# Patient Record
Sex: FEMALE | Race: WHITE | NOT HISPANIC OR LATINO | Employment: UNEMPLOYED | URBAN - METROPOLITAN AREA
[De-identification: names, ages, dates, MRNs, and addresses within clinical notes are randomized per-mention and may not be internally consistent; named-entity substitution may affect disease eponyms.]

---

## 2023-11-07 ENCOUNTER — HOSPITAL ENCOUNTER (INPATIENT)
Facility: HOSPITAL | Age: 48
LOS: 2 days | Discharge: ANOTHER HOSPITAL | DRG: 080 | End: 2023-11-09
Attending: STUDENT IN AN ORGANIZED HEALTH CARE EDUCATION/TRAINING PROGRAM | Admitting: STUDENT IN AN ORGANIZED HEALTH CARE EDUCATION/TRAINING PROGRAM
Payer: COMMERCIAL

## 2023-11-07 ENCOUNTER — APPOINTMENT (OUTPATIENT)
Dept: RADIOLOGY | Facility: HOSPITAL | Age: 48
DRG: 080 | End: 2023-11-07
Payer: COMMERCIAL

## 2023-11-07 ENCOUNTER — APPOINTMENT (OUTPATIENT)
Dept: CARDIOLOGY | Facility: HOSPITAL | Age: 48
DRG: 080 | End: 2023-11-07
Payer: COMMERCIAL

## 2023-11-07 DIAGNOSIS — G93.89 BRAIN MASS: Primary | ICD-10-CM

## 2023-11-07 LAB
ALBUMIN SERPL BCP-MCNC: 4 G/DL (ref 3.4–5)
ALP SERPL-CCNC: 73 U/L (ref 33–110)
ALT SERPL W P-5'-P-CCNC: 17 U/L (ref 7–45)
ANION GAP SERPL CALC-SCNC: 10 MMOL/L (ref 10–20)
APTT PPP: 36 SECONDS (ref 27–38)
AST SERPL W P-5'-P-CCNC: 16 U/L (ref 9–39)
BASOPHILS # BLD AUTO: 0.03 X10*3/UL (ref 0–0.1)
BASOPHILS NFR BLD AUTO: 0.4 %
BILIRUB SERPL-MCNC: 0.3 MG/DL (ref 0–1.2)
BUN SERPL-MCNC: 11 MG/DL (ref 6–23)
CALCIUM SERPL-MCNC: 9.4 MG/DL (ref 8.6–10.3)
CARDIAC TROPONIN I PNL SERPL HS: 3 NG/L (ref 0–13)
CHLORIDE SERPL-SCNC: 99 MMOL/L (ref 98–107)
CO2 SERPL-SCNC: 28 MMOL/L (ref 21–32)
CREAT SERPL-MCNC: 0.48 MG/DL (ref 0.5–1.05)
EOSINOPHIL # BLD AUTO: 0.39 X10*3/UL (ref 0–0.7)
EOSINOPHIL NFR BLD AUTO: 5 %
ERYTHROCYTE [DISTWIDTH] IN BLOOD BY AUTOMATED COUNT: 13.5 % (ref 11.5–14.5)
GFR SERPL CREATININE-BSD FRML MDRD: >90 ML/MIN/1.73M*2
GLUCOSE BLD MANUAL STRIP-MCNC: 144 MG/DL (ref 74–99)
GLUCOSE SERPL-MCNC: 128 MG/DL (ref 74–99)
HCT VFR BLD AUTO: 40 % (ref 36–46)
HGB BLD-MCNC: 13.8 G/DL (ref 12–16)
IMM GRANULOCYTES # BLD AUTO: 0.03 X10*3/UL (ref 0–0.7)
IMM GRANULOCYTES NFR BLD AUTO: 0.4 % (ref 0–0.9)
INR PPP: 1.1 (ref 0.9–1.1)
LYMPHOCYTES # BLD AUTO: 1.44 X10*3/UL (ref 1.2–4.8)
LYMPHOCYTES NFR BLD AUTO: 18.4 %
MCH RBC QN AUTO: 31 PG (ref 26–34)
MCHC RBC AUTO-ENTMCNC: 34.5 G/DL (ref 32–36)
MCV RBC AUTO: 90 FL (ref 80–100)
MONOCYTES # BLD AUTO: 0.43 X10*3/UL (ref 0.1–1)
MONOCYTES NFR BLD AUTO: 5.5 %
NEUTROPHILS # BLD AUTO: 5.51 X10*3/UL (ref 1.2–7.7)
NEUTROPHILS NFR BLD AUTO: 70.3 %
NRBC BLD-RTO: 0 /100 WBCS (ref 0–0)
PLATELET # BLD AUTO: 320 X10*3/UL (ref 150–450)
POTASSIUM SERPL-SCNC: 3.3 MMOL/L (ref 3.5–5.3)
PROT SERPL-MCNC: 7.4 G/DL (ref 6.4–8.2)
PROTHROMBIN TIME: 11.9 SECONDS (ref 9.8–12.8)
RBC # BLD AUTO: 4.45 X10*6/UL (ref 4–5.2)
SODIUM SERPL-SCNC: 134 MMOL/L (ref 136–145)
TSH SERPL-ACNC: 2.88 MIU/L (ref 0.44–3.98)
WBC # BLD AUTO: 7.8 X10*3/UL (ref 4.4–11.3)

## 2023-11-07 PROCEDURE — 84484 ASSAY OF TROPONIN QUANT: CPT | Performed by: STUDENT IN AN ORGANIZED HEALTH CARE EDUCATION/TRAINING PROGRAM

## 2023-11-07 PROCEDURE — 80053 COMPREHEN METABOLIC PANEL: CPT | Performed by: STUDENT IN AN ORGANIZED HEALTH CARE EDUCATION/TRAINING PROGRAM

## 2023-11-07 PROCEDURE — 82947 ASSAY GLUCOSE BLOOD QUANT: CPT

## 2023-11-07 PROCEDURE — 85610 PROTHROMBIN TIME: CPT | Performed by: STUDENT IN AN ORGANIZED HEALTH CARE EDUCATION/TRAINING PROGRAM

## 2023-11-07 PROCEDURE — 70450 CT HEAD/BRAIN W/O DYE: CPT

## 2023-11-07 PROCEDURE — 36415 COLL VENOUS BLD VENIPUNCTURE: CPT | Performed by: STUDENT IN AN ORGANIZED HEALTH CARE EDUCATION/TRAINING PROGRAM

## 2023-11-07 PROCEDURE — 85025 COMPLETE CBC W/AUTO DIFF WBC: CPT | Performed by: STUDENT IN AN ORGANIZED HEALTH CARE EDUCATION/TRAINING PROGRAM

## 2023-11-07 PROCEDURE — 99285 EMERGENCY DEPT VISIT HI MDM: CPT | Mod: 25 | Performed by: STUDENT IN AN ORGANIZED HEALTH CARE EDUCATION/TRAINING PROGRAM

## 2023-11-07 PROCEDURE — 93005 ELECTROCARDIOGRAM TRACING: CPT

## 2023-11-07 PROCEDURE — 99223 1ST HOSP IP/OBS HIGH 75: CPT | Performed by: STUDENT IN AN ORGANIZED HEALTH CARE EDUCATION/TRAINING PROGRAM

## 2023-11-07 PROCEDURE — 1210000001 HC SEMI-PRIVATE ROOM DAILY

## 2023-11-07 PROCEDURE — 85730 THROMBOPLASTIN TIME PARTIAL: CPT | Performed by: STUDENT IN AN ORGANIZED HEALTH CARE EDUCATION/TRAINING PROGRAM

## 2023-11-07 PROCEDURE — 2500000004 HC RX 250 GENERAL PHARMACY W/ HCPCS (ALT 636 FOR OP/ED): Performed by: STUDENT IN AN ORGANIZED HEALTH CARE EDUCATION/TRAINING PROGRAM

## 2023-11-07 PROCEDURE — 96374 THER/PROPH/DIAG INJ IV PUSH: CPT

## 2023-11-07 PROCEDURE — 84443 ASSAY THYROID STIM HORMONE: CPT | Performed by: STUDENT IN AN ORGANIZED HEALTH CARE EDUCATION/TRAINING PROGRAM

## 2023-11-07 PROCEDURE — 70450 CT HEAD/BRAIN W/O DYE: CPT | Performed by: RADIOLOGY

## 2023-11-07 RX ORDER — BUDESONIDE 1 MG/2ML
1 INHALANT ORAL 2 TIMES DAILY
COMMUNITY
Start: 2022-09-12 | End: 2024-04-08 | Stop reason: SDUPTHER

## 2023-11-07 RX ORDER — LEVALBUTEROL INHALATION SOLUTION 1.25 MG/3ML
1 SOLUTION RESPIRATORY (INHALATION) EVERY 4 HOURS PRN
COMMUNITY
Start: 2023-10-23 | End: 2024-04-08 | Stop reason: SDUPTHER

## 2023-11-07 RX ORDER — BUDESONIDE 0.5 MG/2ML
1 INHALANT ORAL
Status: DISCONTINUED | OUTPATIENT
Start: 2023-11-07 | End: 2023-11-08

## 2023-11-07 RX ORDER — DEXAMETHASONE SODIUM PHOSPHATE 10 MG/ML
10 INJECTION INTRAMUSCULAR; INTRAVENOUS ONCE
Status: COMPLETED | OUTPATIENT
Start: 2023-11-07 | End: 2023-11-07

## 2023-11-07 RX ORDER — MONTELUKAST SODIUM 10 MG/1
10 TABLET ORAL NIGHTLY
Status: DISCONTINUED | OUTPATIENT
Start: 2023-11-07 | End: 2023-11-08

## 2023-11-07 RX ORDER — POTASSIUM CHLORIDE 20 MEQ/1
40 TABLET, EXTENDED RELEASE ORAL DAILY
Status: DISCONTINUED | OUTPATIENT
Start: 2023-11-07 | End: 2023-11-08

## 2023-11-07 RX ORDER — LEVALBUTEROL 1.25 MG/.5ML
1.25 SOLUTION, CONCENTRATE RESPIRATORY (INHALATION) ONCE
Status: DISCONTINUED | OUTPATIENT
Start: 2023-11-07 | End: 2023-11-08

## 2023-11-07 RX ORDER — MONTELUKAST SODIUM 10 MG/1
1 TABLET ORAL NIGHTLY
COMMUNITY
Start: 2022-06-03

## 2023-11-07 RX ADMIN — POTASSIUM CHLORIDE 40 MEQ: 1500 TABLET, EXTENDED RELEASE ORAL at 17:00

## 2023-11-07 RX ADMIN — DEXAMETHASONE SODIUM PHOSPHATE 10 MG: 10 INJECTION, SOLUTION INTRAMUSCULAR; INTRAVENOUS at 15:18

## 2023-11-07 ASSESSMENT — PAIN SCALES - GENERAL
PAINLEVEL_OUTOF10: 0 - NO PAIN
PAINLEVEL_OUTOF10: 8

## 2023-11-07 ASSESSMENT — ABCD2 SCORE
CLINICAL FEATURES OF THE TIA: NO UNILATERAL WEAKNESS OR SPEECH DISTURBANCE
ABCD2 SCORE: 2
HISTORY OF DIABETES: NO
AGE IS GREATER THAN OR EQUAL TO 60: NO
DURATION OF SYMPTOMS: 10-59
BP IS HIGHER THAN 140/90 MMHG: YES

## 2023-11-07 ASSESSMENT — COLUMBIA-SUICIDE SEVERITY RATING SCALE - C-SSRS
2. HAVE YOU ACTUALLY HAD ANY THOUGHTS OF KILLING YOURSELF?: NO
1. IN THE PAST MONTH, HAVE YOU WISHED YOU WERE DEAD OR WISHED YOU COULD GO TO SLEEP AND NOT WAKE UP?: NO
6. HAVE YOU EVER DONE ANYTHING, STARTED TO DO ANYTHING, OR PREPARED TO DO ANYTHING TO END YOUR LIFE?: NO

## 2023-11-07 ASSESSMENT — LIFESTYLE VARIABLES
HAVE PEOPLE ANNOYED YOU BY CRITICIZING YOUR DRINKING: NO
EVER FELT BAD OR GUILTY ABOUT YOUR DRINKING: NO
REASON UNABLE TO ASSESS: YES
HAVE YOU EVER FELT YOU SHOULD CUT DOWN ON YOUR DRINKING: NO
EVER HAD A DRINK FIRST THING IN THE MORNING TO STEADY YOUR NERVES TO GET RID OF A HANGOVER: NO

## 2023-11-07 ASSESSMENT — PAIN - FUNCTIONAL ASSESSMENT: PAIN_FUNCTIONAL_ASSESSMENT: 0-10

## 2023-11-07 NOTE — ED PROVIDER NOTES
"HPI   Chief Complaint   Patient presents with    Numbness     \"At 1000 opening laundry door sudden had numbness all on the right side.\"  \"Still currently have numbness and numbness on that whole side.  It is like  fog.\"       Patient is a 48-year-old female presenting to the emergency department due to complaints of right-sided numbness.  Patient states that this past Saturday she had numbness in her right leg that lasted for about half hour and then self resolved.  Patient states that today at around 10 AM she was opening a door for her dog when she just noticed that her entire right side went numb for about 10 minutes and then she noticed that she was having a tingling sensation throughout the right side of her body.  On my evaluation the patient states that she is no longer having any numbness and states that the right side of her body feels like after an extremity falls asleep.  Patient is moving all extremities.  There is no reported syncopal episodes or speech changes.  Patient denies any fevers, chills, chest pain, difficulty breathing, abdominal pain, nausea/vomiting, change in bowel or bladder habits, falls or traumatic injuries.  Patient Endorses that she was in her normal state of health upon awakening this morning.      History provided by:  Patient   used: No        ABCD2 Score: 2               [place] Coma Scale Score: 15                  Patient History   Past Medical History:   Diagnosis Date    Acute candidiasis of vulva and vagina 07/01/2016    Yeast vaginitis    Acute laryngitis 05/29/2017    Acute laryngitis    Acute sinusitis, unspecified 03/29/2020    Acute rhinosinusitis    Acute tonsillitis, unspecified 10/20/2016    Acute tonsillitis    Adjustment disorder with mixed anxiety and depressed mood 03/02/2016    Adjustment reaction with anxiety and depression    Body mass index (BMI)40.0-44.9, adult 03/29/2020    Body mass index (BMI) of 40.0 to 44.9 in adult    Body mass " index (BMI)40.0-44.9, adult 01/18/2020    Body mass index (BMI) of 40.0 to 44.9 in adult    Contact with and (suspected) exposure to unspecified communicable disease 03/24/2020    Exposure to communicable diseases    Enlarged lymph nodes, unspecified 03/21/2016    Glands swollen    Essential (primary) hypertension 09/05/2014    Benign essential hypertension    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    Facial twitching    Mild intermittent asthma with (acute) exacerbation 01/11/2017    Mild intermittent reactive airway disease with acute exacerbation    Other conditions influencing health status 05/29/2017    History of cough    Other conditions influencing health status     History of pregnancy    Personal history of malignant melanoma of skin 12/15/2021    History of malignant melanoma    Personal history of malignant neoplasm of cervix uteri     History of malignant neoplasm of cervix uteri    Personal history of other diseases of the circulatory system 10/25/2014    History of varicose veins    Personal history of other diseases of the circulatory system     Personal history of supraventricular tachycardia    Personal history of other diseases of the female genital tract 02/06/2015    History of dysfunctional uterine bleeding    Personal history of other diseases of the female genital tract 12/22/2017    History of abnormal cervical Papanicolaou smear    Personal history of other diseases of the respiratory system 10/18/2016    History of pharyngitis    Personal history of other diseases of the respiratory system 04/03/2019    History of acute bronchitis with bronchospasm    Personal history of other diseases of the respiratory system 05/29/2017    History of acute sinusitis    Personal history of other diseases of the respiratory system 12/22/2017    History of acute bronchitis with bronchospasm    Personal history of other endocrine, nutritional and metabolic disease 01/16/2019    History of obesity    Personal history of  other specified conditions 03/02/2016    History of lymphadenopathy    Personal history of other specified conditions     History of abnormal Pap smear    Unspecified asthma with (acute) exacerbation 03/31/2019    Acute asthma exacerbation    Unspecified open wound of other finger without damage to nail, initial encounter 11/26/2019    Avulsion of skin of index finger, initial encounter    Urinary tract infection, site not specified 01/16/2019    Frequent UTI    Urinary tract infection, site not specified 06/28/2016    Acute UTI     Past Surgical History:   Procedure Laterality Date    CERVICAL BIOPSY  W/ LOOP ELECTRODE EXCISION  10/25/2014    Cervical Loop Electrosurgical Excision (LEEP)    HYSTERECTOMY  05/19/2015    Hysterectomy    OTHER SURGICAL HISTORY  03/02/2016    Vaginal Surg Insertion Of Mesh For Pelvic Floor Repair    OTHER SURGICAL HISTORY  10/25/2014    Biopsy Skin     No family history on file.  Social History     Tobacco Use    Smoking status: Never    Smokeless tobacco: Never   Vaping Use    Vaping Use: Never used   Substance Use Topics    Alcohol use: Never    Drug use: Never       Physical Exam   ED Triage Vitals [11/07/23 1229]   Temp Heart Rate Resp BP   36.5 °C (97.7 °F) 87 18 180/85      SpO2 Temp Source Heart Rate Source Patient Position   97 % Tympanic -- Sitting      BP Location FiO2 (%)     Right arm --       Physical Exam  Vitals and nursing note reviewed.   Constitutional:       General: She is not in acute distress.     Appearance: Normal appearance. She is not ill-appearing, toxic-appearing or diaphoretic.   HENT:      Head: Normocephalic and atraumatic.      Nose: Nose normal.      Mouth/Throat:      Mouth: Mucous membranes are dry.      Pharynx: No oropharyngeal exudate or posterior oropharyngeal erythema.   Eyes:      General: No scleral icterus.     Extraocular Movements: Extraocular movements intact.      Pupils: Pupils are equal, round, and reactive to light.   Cardiovascular:       Rate and Rhythm: Normal rate and regular rhythm.      Pulses: Normal pulses.      Heart sounds: Normal heart sounds. No murmur heard.     No friction rub. No gallop.   Pulmonary:      Effort: Pulmonary effort is normal. No respiratory distress.      Breath sounds: Normal breath sounds. No stridor. No wheezing, rhonchi or rales.   Chest:      Chest wall: No tenderness.   Abdominal:      General: Abdomen is flat. There is no distension.      Palpations: Abdomen is soft. There is no mass.      Tenderness: There is no abdominal tenderness. There is no guarding.      Hernia: No hernia is present.   Musculoskeletal:         General: No swelling, tenderness, deformity or signs of injury. Normal range of motion.      Cervical back: Normal range of motion and neck supple. No rigidity.   Skin:     General: Skin is warm and dry.      Capillary Refill: Capillary refill takes less than 2 seconds.      Coloration: Skin is not jaundiced or pale.      Findings: No bruising, erythema, lesion or rash.   Neurological:      General: No focal deficit present.      Mental Status: She is alert and oriented to person, place, and time. Mental status is at baseline.      Cranial Nerves: No cranial nerve deficit.      Sensory: No sensory deficit.      Motor: No weakness.      Coordination: Coordination normal.   Psychiatric:         Mood and Affect: Mood normal.         Behavior: Behavior normal.         ED Course & MDM   Diagnoses as of 11/07/23 1647   Brain mass       Medical Decision Making  Patient is a 48-year-old female presenting to the emergency department for complaints of right-sided numbness.  Patient has was concerning for TIA type symptoms.  Patient had numbness in her right leg that self resolved after half hour was last Saturday.  Patient states that she had a recurrence again that was in her entire right side body lasting for approximately 10 minutes.  On my evaluation the patient states that sensation is tested with the same  on left and right denies any vision changes, headache, chest pain, back pain.  NIH of 0 on my evaluation.  Patient has an ABCD 2 score of 2.  Labs and CT head were ordered for further evaluation.    Radiology called to inform me that there was a 4 cm mass in the left parietal region with some vasogenic edema.  I did call the transfer center and spoke to on-call neurosurgeon Dr. Mahan who agreed to accept the patient for transfer for further evaluation of the mass and steroids were recommended.  Patient was given 10 of Decadron IV.  Patient was advised of her imaging findings and current care plan and she is in agreement with this.  CBC reviewed and unremarkable.  Coagulation studies normal.  Troponin is negative.  TSH is normal.  Metabolic panel notable for slightly elevated glucose and hypokalemia of 3.3.  Replacement was ordered.  Patient does take intermittent steroids for her asthma and suspected that is partially related for the elevated glucose.  EKG was nonischemic.  No arrhythmia noted.  Patient signed out to oncoming provider pending transfer to [facility].    Amount and/or Complexity of Data Reviewed  Labs: ordered. Decision-making details documented in ED Course.  Radiology: ordered. Decision-making details documented in ED Course.  ECG/medicine tests: independent interpretation performed.     Details: Sinus rhythm with a ventricular rate of 82 bpm.  QRS interval 90 ms.  QTc 481 ms.  No acute ischemic injury pattern appreciated.      Labs Reviewed   COMPREHENSIVE METABOLIC PANEL - Abnormal       Result Value    Glucose 128 (*)     Sodium 134 (*)     Potassium 3.3 (*)     Chloride 99      Bicarbonate 28      Anion Gap 10      Urea Nitrogen 11      Creatinine 0.48 (*)     eGFR >90      Calcium 9.4      Albumin 4.0      Alkaline Phosphatase 73      Total Protein 7.4      AST 16      Bilirubin, Total 0.3      ALT 17     POCT GLUCOSE - Abnormal    POCT Glucose 144 (*)    TROPONIN I, HIGH SENSITIVITY -  Normal    Troponin I, High Sensitivity 3      Narrative:     Less than 99th percentile of normal range cutoff-  Female and children under 18 years old <14 ng/L; Male <21 ng/L: Negative  Repeat testing should be performed if clinically indicated.     Female and children under 18 years old 14-50 ng/L; Male 21-50 ng/L:  Consistent with possible cardiac damage and possible increased clinical   risk. Serial measurements may help to assess extent of myocardial damage.     >50 ng/L: Consistent with cardiac damage, increased clinical risk and  myocardial infarction. Serial measurements may help assess extent of   myocardial damage.      NOTE: Children less than 1 year old may have higher baseline troponin   levels and results should be interpreted in conjunction with the overall   clinical context.     NOTE: Troponin I testing is performed using a different   testing methodology at [facility] than at other   [facility] hospitals. Direct result comparisons should only   be made within the same method.   PROTIME-INR - Normal    Protime 11.9      INR 1.1     APTT - Normal    aPTT 36      Narrative:     The APTT is no longer used for monitoring Unfractionated Heparin Therapy. For monitoring Heparin Therapy, use the Heparin Assay.   TSH WITH REFLEX TO FREE T4 IF ABNORMAL - Normal    Thyroid Stimulating Hormone 2.88      Narrative:     TSH testing is performed using different testing methodology at [facility] than at other [facility]. Direct result comparisons should only be made within the same method.     CBC WITH AUTO DIFFERENTIAL    WBC 7.8      nRBC 0.0      RBC 4.45      Hemoglobin 13.8      Hematocrit 40.0      MCV 90      MCH 31.0      MCHC 34.5      RDW 13.5      Platelets 320      Neutrophils % 70.3      Immature Granulocytes %, Automated 0.4      Lymphocytes % 18.4      Monocytes % 5.5      Eosinophils % 5.0      Basophils % 0.4      Neutrophils Absolute 5.51      Immature Granulocytes  Absolute, Automated 0.03      Lymphocytes Absolute 1.44      Monocytes Absolute 0.43      Eosinophils Absolute 0.39      Basophils Absolute 0.03     POCT GLUCOSE METER     CT head wo IV contrast   Final Result   4.1 cm mass in the left parietal region with adjacent vasogenic   edema. Further evaluation with an MRI of the brain is suggested.        MACRO:   Kaitlin Fofana discussed the significance and urgency of this critical   finding by telephone with  RY ALTAMIRANO on 11/7/2023 at 1:54 pm.   (**-RCF-**) Findings:  See findings.                  Signed by: Kaitlin Fofana 11/7/2023 1:54 PM   Dictation workstation:   [id number]            Procedure  Procedures     Ry Altamirano DO  11/07/23 2047

## 2023-11-08 ENCOUNTER — APPOINTMENT (OUTPATIENT)
Dept: RADIOLOGY | Facility: HOSPITAL | Age: 48
DRG: 080 | End: 2023-11-08
Payer: COMMERCIAL

## 2023-11-08 VITALS
WEIGHT: 255.73 LBS | DIASTOLIC BLOOD PRESSURE: 58 MMHG | BODY MASS INDEX: 41.1 KG/M2 | SYSTOLIC BLOOD PRESSURE: 108 MMHG | HEART RATE: 96 BPM | TEMPERATURE: 97 F | HEIGHT: 66 IN | OXYGEN SATURATION: 93 % | RESPIRATION RATE: 20 BRPM

## 2023-11-08 LAB
ANION GAP SERPL CALC-SCNC: 14 MMOL/L (ref 10–20)
BASOPHILS # BLD AUTO: 0.01 X10*3/UL (ref 0–0.1)
BASOPHILS NFR BLD AUTO: 0.1 %
BUN SERPL-MCNC: 11 MG/DL (ref 6–23)
CALCIUM SERPL-MCNC: 9.7 MG/DL (ref 8.6–10.3)
CHLORIDE SERPL-SCNC: 98 MMOL/L (ref 98–107)
CO2 SERPL-SCNC: 28 MMOL/L (ref 21–32)
CREAT SERPL-MCNC: 0.43 MG/DL (ref 0.5–1.05)
EOSINOPHIL # BLD AUTO: 0 X10*3/UL (ref 0–0.7)
EOSINOPHIL NFR BLD AUTO: 0 %
ERYTHROCYTE [DISTWIDTH] IN BLOOD BY AUTOMATED COUNT: 13.4 % (ref 11.5–14.5)
GFR SERPL CREATININE-BSD FRML MDRD: >90 ML/MIN/1.73M*2
GLUCOSE SERPL-MCNC: 187 MG/DL (ref 74–99)
HCT VFR BLD AUTO: 39.8 % (ref 36–46)
HGB BLD-MCNC: 14 G/DL (ref 12–16)
HOLD SPECIMEN: NORMAL
IMM GRANULOCYTES # BLD AUTO: 0.03 X10*3/UL (ref 0–0.7)
IMM GRANULOCYTES NFR BLD AUTO: 0.4 % (ref 0–0.9)
LYMPHOCYTES # BLD AUTO: 0.8 X10*3/UL (ref 1.2–4.8)
LYMPHOCYTES NFR BLD AUTO: 11.8 %
MCH RBC QN AUTO: 31.3 PG (ref 26–34)
MCHC RBC AUTO-ENTMCNC: 35.2 G/DL (ref 32–36)
MCV RBC AUTO: 89 FL (ref 80–100)
MONOCYTES # BLD AUTO: 0.09 X10*3/UL (ref 0.1–1)
MONOCYTES NFR BLD AUTO: 1.3 %
NEUTROPHILS # BLD AUTO: 5.85 X10*3/UL (ref 1.2–7.7)
NEUTROPHILS NFR BLD AUTO: 86.4 %
NRBC BLD-RTO: 0 /100 WBCS (ref 0–0)
PLATELET # BLD AUTO: 345 X10*3/UL (ref 150–450)
POTASSIUM SERPL-SCNC: 3.5 MMOL/L (ref 3.5–5.3)
RBC # BLD AUTO: 4.48 X10*6/UL (ref 4–5.2)
SODIUM SERPL-SCNC: 136 MMOL/L (ref 136–145)
WBC # BLD AUTO: 6.8 X10*3/UL (ref 4.4–11.3)

## 2023-11-08 PROCEDURE — 36415 COLL VENOUS BLD VENIPUNCTURE: CPT | Performed by: STUDENT IN AN ORGANIZED HEALTH CARE EDUCATION/TRAINING PROGRAM

## 2023-11-08 PROCEDURE — 2500000004 HC RX 250 GENERAL PHARMACY W/ HCPCS (ALT 636 FOR OP/ED): Performed by: STUDENT IN AN ORGANIZED HEALTH CARE EDUCATION/TRAINING PROGRAM

## 2023-11-08 PROCEDURE — 70553 MRI BRAIN STEM W/O & W/DYE: CPT | Performed by: RADIOLOGY

## 2023-11-08 PROCEDURE — 99232 SBSQ HOSP IP/OBS MODERATE 35: CPT | Performed by: STUDENT IN AN ORGANIZED HEALTH CARE EDUCATION/TRAINING PROGRAM

## 2023-11-08 PROCEDURE — 2500000001 HC RX 250 WO HCPCS SELF ADMINISTERED DRUGS (ALT 637 FOR MEDICARE OP): Performed by: STUDENT IN AN ORGANIZED HEALTH CARE EDUCATION/TRAINING PROGRAM

## 2023-11-08 PROCEDURE — 94640 AIRWAY INHALATION TREATMENT: CPT

## 2023-11-08 PROCEDURE — 85025 COMPLETE CBC W/AUTO DIFF WBC: CPT | Performed by: STUDENT IN AN ORGANIZED HEALTH CARE EDUCATION/TRAINING PROGRAM

## 2023-11-08 PROCEDURE — 80048 BASIC METABOLIC PNL TOTAL CA: CPT | Performed by: STUDENT IN AN ORGANIZED HEALTH CARE EDUCATION/TRAINING PROGRAM

## 2023-11-08 PROCEDURE — 70553 MRI BRAIN STEM W/O & W/DYE: CPT

## 2023-11-08 PROCEDURE — 1210000001 HC SEMI-PRIVATE ROOM DAILY

## 2023-11-08 PROCEDURE — 2500000002 HC RX 250 W HCPCS SELF ADMINISTERED DRUGS (ALT 637 FOR MEDICARE OP, ALT 636 FOR OP/ED): Performed by: STUDENT IN AN ORGANIZED HEALTH CARE EDUCATION/TRAINING PROGRAM

## 2023-11-08 PROCEDURE — A9575 INJ GADOTERATE MEGLUMI 0.1ML: HCPCS | Performed by: STUDENT IN AN ORGANIZED HEALTH CARE EDUCATION/TRAINING PROGRAM

## 2023-11-08 PROCEDURE — 2550000001 HC RX 255 CONTRASTS: Performed by: STUDENT IN AN ORGANIZED HEALTH CARE EDUCATION/TRAINING PROGRAM

## 2023-11-08 RX ORDER — FLUTICASONE FUROATE, UMECLIDINIUM BROMIDE AND VILANTEROL TRIFENATATE 200; 62.5; 25 UG/1; UG/1; UG/1
200 POWDER RESPIRATORY (INHALATION) DAILY
COMMUNITY

## 2023-11-08 RX ORDER — BUDESONIDE 0.5 MG/2ML
0.5 INHALANT ORAL
Status: DISCONTINUED | OUTPATIENT
Start: 2023-11-08 | End: 2023-11-09 | Stop reason: HOSPADM

## 2023-11-08 RX ORDER — GADOTERATE MEGLUMINE 376.9 MG/ML
0.2 INJECTION INTRAVENOUS
Status: COMPLETED | OUTPATIENT
Start: 2023-11-08 | End: 2023-11-08

## 2023-11-08 RX ORDER — ATENOLOL 50 MG/1
50 TABLET ORAL DAILY
Status: DISCONTINUED | OUTPATIENT
Start: 2023-11-08 | End: 2023-11-09 | Stop reason: HOSPADM

## 2023-11-08 RX ORDER — FLUTICASONE FUROATE AND VILANTEROL 200; 25 UG/1; UG/1
1 POWDER RESPIRATORY (INHALATION) DAILY
Status: DISCONTINUED | OUTPATIENT
Start: 2023-11-08 | End: 2023-11-09 | Stop reason: HOSPADM

## 2023-11-08 RX ORDER — ATENOLOL AND CHLORTHALIDONE TABLET 50; 25 MG/1; MG/1
1 TABLET ORAL DAILY
COMMUNITY
End: 2023-12-04 | Stop reason: SDUPTHER

## 2023-11-08 RX ORDER — LISINOPRIL 20 MG/1
20 TABLET ORAL DAILY
COMMUNITY
End: 2023-11-17 | Stop reason: HOSPADM

## 2023-11-08 RX ORDER — ACETAMINOPHEN 650 MG/1
650 SUPPOSITORY RECTAL EVERY 4 HOURS PRN
Status: DISCONTINUED | OUTPATIENT
Start: 2023-11-08 | End: 2023-11-09 | Stop reason: HOSPADM

## 2023-11-08 RX ORDER — ACETAMINOPHEN 325 MG/1
650 TABLET ORAL EVERY 4 HOURS PRN
Status: DISCONTINUED | OUTPATIENT
Start: 2023-11-08 | End: 2023-11-09 | Stop reason: HOSPADM

## 2023-11-08 RX ORDER — CHLORTHALIDONE 25 MG/1
25 TABLET ORAL DAILY
Status: DISCONTINUED | OUTPATIENT
Start: 2023-11-08 | End: 2023-11-09 | Stop reason: HOSPADM

## 2023-11-08 RX ORDER — DEXAMETHASONE SODIUM PHOSPHATE 4 MG/ML
4 INJECTION, SOLUTION INTRA-ARTICULAR; INTRALESIONAL; INTRAMUSCULAR; INTRAVENOUS; SOFT TISSUE EVERY 6 HOURS
Status: DISCONTINUED | OUTPATIENT
Start: 2023-11-08 | End: 2023-11-09 | Stop reason: HOSPADM

## 2023-11-08 RX ORDER — OMALIZUMAB 150 MG/ML
150 INJECTION, SOLUTION SUBCUTANEOUS
Status: ON HOLD | COMMUNITY
End: 2023-11-16 | Stop reason: SDUPTHER

## 2023-11-08 RX ORDER — LORAZEPAM 2 MG/ML
1 INJECTION INTRAMUSCULAR ONCE AS NEEDED
Status: DISCONTINUED | OUTPATIENT
Start: 2023-11-08 | End: 2023-11-09 | Stop reason: HOSPADM

## 2023-11-08 RX ORDER — POLYETHYLENE GLYCOL 3350 17 G/17G
17 POWDER, FOR SOLUTION ORAL DAILY PRN
Status: DISCONTINUED | OUTPATIENT
Start: 2023-11-08 | End: 2023-11-09 | Stop reason: HOSPADM

## 2023-11-08 RX ORDER — ATENOLOL AND CHLORTHALIDONE TABLET 50; 25 MG/1; MG/1
1 TABLET ORAL DAILY
Status: DISCONTINUED | OUTPATIENT
Start: 2023-11-08 | End: 2023-11-08 | Stop reason: CLARIF

## 2023-11-08 RX ORDER — PREDNISONE 5 MG/1
5 TABLET ORAL AS NEEDED
Status: ON HOLD | COMMUNITY
End: 2023-11-08 | Stop reason: ALTCHOICE

## 2023-11-08 RX ORDER — LISINOPRIL 5 MG/1
5 TABLET ORAL DAILY
Status: DISCONTINUED | OUTPATIENT
Start: 2023-11-08 | End: 2023-11-09 | Stop reason: HOSPADM

## 2023-11-08 RX ORDER — LEVALBUTEROL 1.25 MG/.5ML
1.25 SOLUTION, CONCENTRATE RESPIRATORY (INHALATION)
Status: DISCONTINUED | OUTPATIENT
Start: 2023-11-08 | End: 2023-11-09 | Stop reason: HOSPADM

## 2023-11-08 RX ORDER — ALPRAZOLAM 0.5 MG/1
0.5 TABLET ORAL ONCE AS NEEDED
Status: COMPLETED | OUTPATIENT
Start: 2023-11-08 | End: 2023-11-08

## 2023-11-08 RX ORDER — MONTELUKAST SODIUM 10 MG/1
10 TABLET ORAL NIGHTLY
Status: DISCONTINUED | OUTPATIENT
Start: 2023-11-08 | End: 2023-11-09 | Stop reason: HOSPADM

## 2023-11-08 RX ORDER — ALPRAZOLAM 0.5 MG/1
0.5 TABLET ORAL NIGHTLY PRN
Status: DISCONTINUED | OUTPATIENT
Start: 2023-11-08 | End: 2023-11-08

## 2023-11-08 RX ORDER — ACETAMINOPHEN 160 MG/5ML
650 SOLUTION ORAL EVERY 4 HOURS PRN
Status: DISCONTINUED | OUTPATIENT
Start: 2023-11-08 | End: 2023-11-09 | Stop reason: HOSPADM

## 2023-11-08 RX ADMIN — BUDESONIDE 0.5 MG: 0.5 INHALANT RESPIRATORY (INHALATION) at 19:28

## 2023-11-08 RX ADMIN — ATENOLOL 50 MG: 50 TABLET ORAL at 09:17

## 2023-11-08 RX ADMIN — CHLORTHALIDONE 25 MG: 25 TABLET ORAL at 09:17

## 2023-11-08 RX ADMIN — DEXAMETHASONE SODIUM PHOSPHATE 4 MG: 4 INJECTION, SOLUTION INTRAMUSCULAR; INTRAVENOUS at 06:43

## 2023-11-08 RX ADMIN — DEXAMETHASONE SODIUM PHOSPHATE 4 MG: 4 INJECTION, SOLUTION INTRAMUSCULAR; INTRAVENOUS at 19:40

## 2023-11-08 RX ADMIN — MONTELUKAST SODIUM 10 MG: 10 TABLET, FILM COATED ORAL at 20:00

## 2023-11-08 RX ADMIN — LISINOPRIL 5 MG: 5 TABLET ORAL at 09:17

## 2023-11-08 RX ADMIN — LEVALBUTEROL HYDROCHLORIDE 1.25 MG: 1.25 SOLUTION, CONCENTRATE RESPIRATORY (INHALATION) at 07:12

## 2023-11-08 RX ADMIN — ALPRAZOLAM 0.5 MG: 0.5 TABLET ORAL at 23:30

## 2023-11-08 RX ADMIN — DEXAMETHASONE SODIUM PHOSPHATE 4 MG: 4 INJECTION, SOLUTION INTRAMUSCULAR; INTRAVENOUS at 13:44

## 2023-11-08 RX ADMIN — LEVALBUTEROL HYDROCHLORIDE 1.25 MG: 1.25 SOLUTION, CONCENTRATE RESPIRATORY (INHALATION) at 19:28

## 2023-11-08 RX ADMIN — DEXAMETHASONE SODIUM PHOSPHATE 4 MG: 4 INJECTION, SOLUTION INTRAMUSCULAR; INTRAVENOUS at 01:28

## 2023-11-08 RX ADMIN — MONTELUKAST SODIUM 10 MG: 10 TABLET, FILM COATED ORAL at 00:18

## 2023-11-08 RX ADMIN — GADOTERATE MEGLUMINE 23 ML: 376.9 INJECTION INTRAVENOUS at 15:52

## 2023-11-08 RX ADMIN — BUDESONIDE 0.5 MG: 0.5 INHALANT RESPIRATORY (INHALATION) at 07:12

## 2023-11-08 SDOH — SOCIAL STABILITY: SOCIAL INSECURITY: WITHIN THE LAST YEAR, HAVE YOU BEEN AFRAID OF YOUR PARTNER OR EX-PARTNER?: NO

## 2023-11-08 SDOH — SOCIAL STABILITY: SOCIAL NETWORK
DO YOU BELONG TO ANY CLUBS OR ORGANIZATIONS SUCH AS CHURCH GROUPS UNIONS, FRATERNAL OR ATHLETIC GROUPS, OR SCHOOL GROUPS?: NO

## 2023-11-08 SDOH — SOCIAL STABILITY: SOCIAL INSECURITY: WITHIN THE LAST YEAR, HAVE YOU BEEN HUMILIATED OR EMOTIONALLY ABUSED IN OTHER WAYS BY YOUR PARTNER OR EX-PARTNER?: NO

## 2023-11-08 SDOH — ECONOMIC STABILITY: FOOD INSECURITY: WITHIN THE PAST 12 MONTHS, YOU WORRIED THAT YOUR FOOD WOULD RUN OUT BEFORE YOU GOT MONEY TO BUY MORE.: NEVER TRUE

## 2023-11-08 SDOH — ECONOMIC STABILITY: INCOME INSECURITY: IN THE LAST 12 MONTHS, WAS THERE A TIME WHEN YOU WERE NOT ABLE TO PAY THE MORTGAGE OR RENT ON TIME?: NO

## 2023-11-08 SDOH — SOCIAL STABILITY: SOCIAL INSECURITY: DO YOU FEEL UNSAFE GOING BACK TO THE PLACE WHERE YOU ARE LIVING?: NO

## 2023-11-08 SDOH — HEALTH STABILITY: MENTAL HEALTH: HOW OFTEN DO YOU HAVE A DRINK CONTAINING ALCOHOL?: NEVER

## 2023-11-08 SDOH — ECONOMIC STABILITY: FOOD INSECURITY: WITHIN THE PAST 12 MONTHS, THE FOOD YOU BOUGHT JUST DIDN'T LAST AND YOU DIDN'T HAVE MONEY TO GET MORE.: NEVER TRUE

## 2023-11-08 SDOH — SOCIAL STABILITY: SOCIAL INSECURITY
WITHIN THE LAST YEAR, HAVE YOU BEEN KICKED, HIT, SLAPPED, OR OTHERWISE PHYSICALLY HURT BY YOUR PARTNER OR EX-PARTNER?: NO

## 2023-11-08 SDOH — SOCIAL STABILITY: SOCIAL INSECURITY: DOES ANYONE TRY TO KEEP YOU FROM HAVING/CONTACTING OTHER FRIENDS OR DOING THINGS OUTSIDE YOUR HOME?: NO

## 2023-11-08 SDOH — SOCIAL STABILITY: SOCIAL NETWORK: ARE YOU MARRIED, WIDOWED, DIVORCED, SEPARATED, NEVER MARRIED, OR LIVING WITH A PARTNER?: MARRIED

## 2023-11-08 SDOH — SOCIAL STABILITY: SOCIAL INSECURITY: ARE THERE ANY APPARENT SIGNS OF INJURIES/BEHAVIORS THAT COULD BE RELATED TO ABUSE/NEGLECT?: NO

## 2023-11-08 SDOH — HEALTH STABILITY: MENTAL HEALTH: EXPERIENCED ANY OF THE FOLLOWING LIFE EVENTS: OTHER (COMMENT)

## 2023-11-08 SDOH — SOCIAL STABILITY: SOCIAL NETWORK: HOW OFTEN DO YOU ATTEND CHURCH OR RELIGIOUS SERVICES?: NEVER

## 2023-11-08 SDOH — ECONOMIC STABILITY: HOUSING INSECURITY
IN THE LAST 12 MONTHS, WAS THERE A TIME WHEN YOU DID NOT HAVE A STEADY PLACE TO SLEEP OR SLEPT IN A SHELTER (INCLUDING NOW)?: NO

## 2023-11-08 SDOH — ECONOMIC STABILITY: HOUSING INSECURITY: IN THE LAST 12 MONTHS, HOW MANY PLACES HAVE YOU LIVED?: 1

## 2023-11-08 SDOH — HEALTH STABILITY: MENTAL HEALTH: HOW OFTEN DO YOU HAVE 6 OR MORE DRINKS ON ONE OCCASION?: NEVER

## 2023-11-08 SDOH — HEALTH STABILITY: MENTAL HEALTH
STRESS IS WHEN SOMEONE FEELS TENSE, NERVOUS, ANXIOUS, OR CAN'T SLEEP AT NIGHT BECAUSE THEIR MIND IS TROUBLED. HOW STRESSED ARE YOU?: NOT AT ALL

## 2023-11-08 SDOH — ECONOMIC STABILITY: TRANSPORTATION INSECURITY
IN THE PAST 12 MONTHS, HAS THE LACK OF TRANSPORTATION KEPT YOU FROM MEDICAL APPOINTMENTS OR FROM GETTING MEDICATIONS?: NO

## 2023-11-08 SDOH — HEALTH STABILITY: MENTAL HEALTH: HOW MANY STANDARD DRINKS CONTAINING ALCOHOL DO YOU HAVE ON A TYPICAL DAY?: PATIENT DOES NOT DRINK

## 2023-11-08 SDOH — ECONOMIC STABILITY: INCOME INSECURITY: IN THE PAST 12 MONTHS, HAS THE ELECTRIC, GAS, OIL, OR WATER COMPANY THREATENED TO SHUT OFF SERVICE IN YOUR HOME?: NO

## 2023-11-08 SDOH — SOCIAL STABILITY: SOCIAL INSECURITY: DO YOU FEEL ANYONE HAS EXPLOITED OR TAKEN ADVANTAGE OF YOU FINANCIALLY OR OF YOUR PERSONAL PROPERTY?: NO

## 2023-11-08 SDOH — SOCIAL STABILITY: SOCIAL INSECURITY: ARE YOU OR HAVE YOU BEEN THREATENED OR ABUSED PHYSICALLY, EMOTIONALLY, OR SEXUALLY BY ANYONE?: NO

## 2023-11-08 SDOH — SOCIAL STABILITY: SOCIAL INSECURITY: POSSIBLE ABUSE REPORTED TO:: OTHER (COMMENT)

## 2023-11-08 SDOH — SOCIAL STABILITY: SOCIAL NETWORK: HOW OFTEN DO YOU GET TOGETHER WITH FRIENDS OR RELATIVES?: TWICE A WEEK

## 2023-11-08 SDOH — HEALTH STABILITY: PHYSICAL HEALTH: ON AVERAGE, HOW MANY MINUTES DO YOU ENGAGE IN EXERCISE AT THIS LEVEL?: 0 MIN

## 2023-11-08 SDOH — SOCIAL STABILITY: SOCIAL INSECURITY
WITHIN THE LAST YEAR, HAVE TO BEEN RAPED OR FORCED TO HAVE ANY KIND OF SEXUAL ACTIVITY BY YOUR PARTNER OR EX-PARTNER?: NO

## 2023-11-08 SDOH — SOCIAL STABILITY: SOCIAL INSECURITY: HAVE YOU HAD THOUGHTS OF HARMING ANYONE ELSE?: NO

## 2023-11-08 SDOH — ECONOMIC STABILITY: TRANSPORTATION INSECURITY
IN THE PAST 12 MONTHS, HAS LACK OF TRANSPORTATION KEPT YOU FROM MEETINGS, WORK, OR FROM GETTING THINGS NEEDED FOR DAILY LIVING?: NO

## 2023-11-08 SDOH — SOCIAL STABILITY: SOCIAL INSECURITY: ABUSE: ADULT

## 2023-11-08 SDOH — HEALTH STABILITY: PHYSICAL HEALTH: ON AVERAGE, HOW MANY DAYS PER WEEK DO YOU ENGAGE IN MODERATE TO STRENUOUS EXERCISE (LIKE A BRISK WALK)?: 0 DAYS

## 2023-11-08 SDOH — SOCIAL STABILITY: SOCIAL NETWORK
IN A TYPICAL WEEK, HOW MANY TIMES DO YOU TALK ON THE PHONE WITH FAMILY, FRIENDS, OR NEIGHBORS?: MORE THAN THREE TIMES A WEEK

## 2023-11-08 SDOH — SOCIAL STABILITY: SOCIAL INSECURITY: WERE YOU ABLE TO COMPLETE ALL THE BEHAVIORAL HEALTH SCREENINGS?: YES

## 2023-11-08 SDOH — SOCIAL STABILITY: SOCIAL INSECURITY: HAS ANYONE EVER THREATENED TO HURT YOUR FAMILY OR YOUR PETS?: NO

## 2023-11-08 SDOH — SOCIAL STABILITY: SOCIAL NETWORK: HOW OFTEN DO YOU ATTENT MEETINGS OF THE CLUB OR ORGANIZATION YOU BELONG TO?: NEVER

## 2023-11-08 SDOH — ECONOMIC STABILITY: INCOME INSECURITY: HOW HARD IS IT FOR YOU TO PAY FOR THE VERY BASICS LIKE FOOD, HOUSING, MEDICAL CARE, AND HEATING?: NOT HARD AT ALL

## 2023-11-08 ASSESSMENT — COGNITIVE AND FUNCTIONAL STATUS - GENERAL
PATIENT BASELINE BEDBOUND: NO
DAILY ACTIVITIY SCORE: 24
MOBILITY SCORE: 24
DAILY ACTIVITIY SCORE: 24
MOBILITY SCORE: 24
MOBILITY SCORE: 24
DAILY ACTIVITIY SCORE: 24

## 2023-11-08 ASSESSMENT — PATIENT HEALTH QUESTIONNAIRE - PHQ9
2. FEELING DOWN, DEPRESSED OR HOPELESS: NOT AT ALL
1. LITTLE INTEREST OR PLEASURE IN DOING THINGS: NOT AT ALL
SUM OF ALL RESPONSES TO PHQ9 QUESTIONS 1 & 2: 0

## 2023-11-08 ASSESSMENT — LIFESTYLE VARIABLES
HOW OFTEN DO YOU HAVE 6 OR MORE DRINKS ON ONE OCCASION: NEVER
SKIP TO QUESTIONS 9-10: 1
HOW MANY STANDARD DRINKS CONTAINING ALCOHOL DO YOU HAVE ON A TYPICAL DAY: PATIENT DOES NOT DRINK
HOW OFTEN DO YOU HAVE A DRINK CONTAINING ALCOHOL: NEVER
AUDIT-C TOTAL SCORE: 0
PRESCIPTION_ABUSE_PAST_12_MONTHS: NO
SUBSTANCE_ABUSE_PAST_12_MONTHS: NO
AUDIT-C TOTAL SCORE: 0
AUDIT-C TOTAL SCORE: 0
SKIP TO QUESTIONS 9-10: 1

## 2023-11-08 ASSESSMENT — ACTIVITIES OF DAILY LIVING (ADL)
LACK_OF_TRANSPORTATION: NO
PATIENT'S MEMORY ADEQUATE TO SAFELY COMPLETE DAILY ACTIVITIES?: YES
HEARING - LEFT EAR: FUNCTIONAL
HEARING - RIGHT EAR: FUNCTIONAL
GROOMING: INDEPENDENT
ADEQUATE_TO_COMPLETE_ADL: YES
JUDGMENT_ADEQUATE_SAFELY_COMPLETE_DAILY_ACTIVITIES: YES
WALKS IN HOME: INDEPENDENT
FEEDING YOURSELF: INDEPENDENT
DRESSING YOURSELF: INDEPENDENT
TOILETING: INDEPENDENT
BATHING: INDEPENDENT

## 2023-11-08 ASSESSMENT — COLUMBIA-SUICIDE SEVERITY RATING SCALE - C-SSRS
6. HAVE YOU EVER DONE ANYTHING, STARTED TO DO ANYTHING, OR PREPARED TO DO ANYTHING TO END YOUR LIFE?: NO
2. HAVE YOU ACTUALLY HAD ANY THOUGHTS OF KILLING YOURSELF?: NO
1. IN THE PAST MONTH, HAVE YOU WISHED YOU WERE DEAD OR WISHED YOU COULD GO TO SLEEP AND NOT WAKE UP?: NO

## 2023-11-08 ASSESSMENT — PAIN SCALES - GENERAL
PAINLEVEL_OUTOF10: 0 - NO PAIN

## 2023-11-08 ASSESSMENT — PAIN - FUNCTIONAL ASSESSMENT: PAIN_FUNCTIONAL_ASSESSMENT: 0-10

## 2023-11-08 NOTE — PROGRESS NOTES
ASSESSMENT & PLAN:     Left parietal mass with vasogenic edema  - Presented from home with right-sided weakness x2 episodes over the last 3 to 4 days (no other symptoms, did have previous headaches occasionally)  - CT head without contrast showing a left parietal 4.1 x 3.4 cm mass with adjacent vasogenic edema  - ED spoke with transfer center, patient accepted by [facility] on-call neurosurgeon Dr. Mahan, awaiting bed availability  - Continue dexamethasone 6 mg IV every 6 hours  - MRI brain with and without contrast  - Neurology consulted  - Holding any antiplatelet and anticoagulation with increased risk of bleeding and possible need for surgical intervention     Severe persistent asthma, not in exacerbation  - Resuming home budesonide, Breo, lev albuterol (weaned herself of prednisone on 10/31)     Essential hypertension  - Resume home medications once reconciled     VTE prophylaxis: SCDs only (no chemoprophylaxis with large brain mass and adjacent vasogenic edema to prevent any hemorrhagic conversion)    11/8/23  -  Patient had MRI done which showed 4 cm homogeneous enhancing calcified extra-axial mass with left parietal convexity invading the inner table and mixed tending to the diploic space of the skull.  This is consistent with a meningioma.  There is also some concern of extension and likely obstruction of the superior sagittal sinus as well as leptomeningeal enhancement worsening extension to the leptomeninges and mild mass effect.  - neurology has seen and assessed patient.  Appreciate recommendations  - plan on  patient being transferred to neurosurgery on main campus for possible surgical intervention.  -  Rest of care as above      Jens Gutierrez MD    SUBJECTIVE      seen and assessed during rounds.  Patient reporting symptoms have significantly improved since starting on Decadron.  She denies any worsening or current blurry vision or headache at this point in time.  Denies any sensory or motor deficits  at this moment in time.    OBJECTIVE:     Last Recorded Vitals:  Vitals:    11/08/23 0400 11/08/23 0736 11/08/23 1242 11/08/23 1619   BP: 135/81 142/85 128/71 145/81   BP Location: Left arm Left arm     Patient Position: Sitting Sitting     Pulse: 97 105 87 86   Resp: 18 18     Temp: 36.4 °C (97.5 °F) 36.4 °C (97.5 °F) 36.6 °C (97.9 °F) 36.4 °C (97.5 °F)   TempSrc: Temporal Temporal     SpO2: 94% 93% 94% 92%   Weight:       Height:         Last I/O:  I/O last 3 completed shifts:  In: 240 (2.1 mL/kg) [P.O.:240]  Out: 1 (0 mL/kg) [Urine:1 (0 mL/kg/hr)]  Weight: 116 kg     Physical Exam  Constitutional:       Appearance: Normal appearance.   HENT:      Head: Normocephalic.      Mouth/Throat:      Mouth: Mucous membranes are moist.   Eyes:      Extraocular Movements: Extraocular movements intact.      Pupils: Pupils are equal, round, and reactive to light.   Cardiovascular:      Pulses: Normal pulses.      Heart sounds: Normal heart sounds.   Pulmonary:      Effort: Pulmonary effort is normal.      Breath sounds: Normal breath sounds.   Abdominal:      Palpations: Abdomen is soft.   Musculoskeletal:         General: Normal range of motion.      Cervical back: Neck supple.   Skin:     General: Skin is warm.      Capillary Refill: Capillary refill takes less than 2 seconds.   Neurological:      Mental Status: She is alert.      Motor: Weakness present.      Gait: Gait abnormal.         Inpatient Medications:  atenolol, 50 mg, oral, Daily   And  chlorthalidone, 25 mg, oral, Daily  budesonide, 0.5 mg, nebulization, BID  dexAMETHasone, 4 mg, intravenous, q6h  fluticasone furoate-vilanteroL, 1 puff, inhalation, Daily  levalbuterol, 1.25 mg, nebulization, BID  lisinopril, 5 mg, oral, Daily  montelukast, 10 mg, oral, Nightly    PRN Medications  PRN medications: acetaminophen **OR** acetaminophen **OR** acetaminophen, LORazepam, polyethylene glycol  Continuous Medications:     LABS AND IMAGING:     Labs:  Results from last 7  days   Lab Units 11/08/23  0526 11/07/23  1447   WBC AUTO x10*3/uL 6.8 7.8   RBC AUTO x10*6/uL 4.48 4.45   HEMOGLOBIN g/dL 14.0 13.8   HEMATOCRIT % 39.8 40.0   MCV fL 89 90   MCH pg 31.3 31.0   MCHC g/dL 35.2 34.5   RDW % 13.4 13.5   PLATELETS AUTO x10*3/uL 345 320     Results from last 7 days   Lab Units 11/08/23  0526 11/07/23  1447   SODIUM mmol/L 136 134*   POTASSIUM mmol/L 3.5 3.3*   CHLORIDE mmol/L 98 99   CO2 mmol/L 28 28   BUN mg/dL 11 11   CREATININE mg/dL 0.43* 0.48*   GLUCOSE mg/dL 187* 128*   PROTEIN TOTAL g/dL  --  7.4   CALCIUM mg/dL 9.7 9.4   BILIRUBIN TOTAL mg/dL  --  0.3   ALK PHOS U/L  --  73   AST U/L  --  16   ALT U/L  --  17         Results from last 7 days   Lab Units 11/07/23  1447   TROPHS ng/L 3     Imaging:  MR brain w and wo IV contrast  Narrative: Interpreted By:  Chance Gore,  and Lia Lara   STUDY:  MR BRAIN W AND WO IV CONTRAST;  11/8/2023 4:07 pm      INDICATION:  Signs/Symptoms:New L parietal mass.      COMPARISON:  CT head on 11/07/2023      ACCESSION NUMBER(S):  [id number]      ORDERING CLINICIAN:  HERBERTH NICOLAS      TECHNIQUE:  Axial T2, FLAIR, DWI, gradient echo T2 and axial T1  weighted images  of brain were acquired. Post contrast T1 weighted images including  volumetric images with multiplanar reconstructions were acquired  after administration of 23 mL of Dotarem, gadolinium based  intravenous contrast.      FINDINGS:  There is a 4.0 x 4.0 x 3.6 cm intensely enhancing extra-axial mass  seen within the left parietal region at the, corresponding to the  mass seen on recent CT. The mass is predominantly T2 isointense, T1  hypointense centrally, with peripheral T2 hyperintensity and T1  hypointensity. There is extensive diffusion restriction seen within  the mass. There is susceptibility artifact seen along the periphery  as well as superiorly in the mass, likely representing mineralization  as seen on recent CT. The mass appears to erode the inner table of  the skull  extending to the diploe without eroding the outer table.      There is mass effect on the surrounding sulci, as well as mass effect  on the right posterior interhemispheric fissure, causing mild  right-sided bowing. There is surrounding T2 and FLAIR hyperintensity,  likely due to vasogenic edema.      There is additional minimal scattered T2 and FLAIR white matter  hyperintensities within the periventricular and subcortical bilateral  cerebral hemispheres, which are nonspecific, however likely related  to sequelae of chronic small vessel disease.      There is no intracranial hemorrhage. There is no midline shift. There  is no diffusion restriction abnormality to suggest an acute infarct.  There is no other area of abnormal parenchymal or leptomeningeal  enhancement.      There are a few susceptibility artifact seen within the right greater  than left anterior frontal lobe, which are likely artifactual.      The ventricles, sulci and basal cisterns are patent.      There are mild right-greater-than-left mastoid effusions. Otherwise,  visualized paranasal sinuses are clear. There is right-sided nasal  septal deviation with a bony spur visualized.      Impression: 1. 4 cm homogeneously enhancing and calcified extra-axial mass,  corresponding to mass seen on recent CT, within the left parietal  convexity, invading the inner table and extending to the diploic  space of the skull is consistent with a meningioma.  2. Extension to and likely obstruction of the superior sagittal sinus  best appreciated on coronal postcontrast enhanced image 23/93 and  adjacent images.  3. Peripheral leptomeningeal enhancement suggesting extension to the  leptomeninges and/or vascular supply from branches of the left middle  cerebral artery.  4. Mild mass effect on the surrounding sulci and posterior  interhemispheric fissure, with vasogenic edema. No midline shift.  5. No acute cortical infarct or intracranial hemorrhage.      I personally  reviewed the images/study and I agree with the findings  as stated. This study was interpreted at [facility], [place], [place].      MACRO:  None      Signed by: Chance Gore 11/8/2023 5:03 PM  Dictation workstation:   [id number]

## 2023-11-08 NOTE — PROGRESS NOTES
Spoke with patient this morning, she states they are recommending her transfer to [facility], she is on the transfer list, will wait for them to transfer patient.

## 2023-11-08 NOTE — CARE PLAN
Problem: Safety - Adult  Goal: Free from fall injury  Outcome: Progressing     The patient's goals for the shift include safety    The clinical goals for the shift include maintain safety

## 2023-11-08 NOTE — CARE PLAN
Problem: Pain  Goal: Takes deep breaths with improved pain control throughout the shift  Outcome: Progressing     Problem: Pain  Goal: Walks with improved pain control throughout the shift  Outcome: Progressing     Problem: Safety - Adult  Goal: Free from fall injury  Outcome: Progressing     Problem: Discharge Planning  Goal: Discharge to home or other facility with appropriate resources  Outcome: Progressing

## 2023-11-08 NOTE — H&P
Medical Group History and Physical    ASSESSMENT & PLAN:     Left parietal mass with vasogenic edema  - Presented from home with right-sided weakness x2 episodes over the last 3 to 4 days (no other symptoms, did have previous headaches occasionally)  - CT head without contrast showing a left parietal 4.1 x 3.4 cm mass with adjacent vasogenic edema  - ED spoke with transfer center, patient accepted by [facility] on-call neurosurgeon Dr. Mahan, awaiting bed availability  - Continue dexamethasone 6 mg IV every 6 hours  - MRI brain with and without contrast  - Neurology consulted  - Holding any antiplatelet and anticoagulation with increased risk of bleeding and possible need for surgical intervention    Severe persistent asthma, not in exacerbation  - Resuming home budesonide, Breo, lev albuterol (weaned herself of prednisone on 10/31)    Essential hypertension  - Resume home medications once reconciled    VTE prophylaxis: SCDs only (no chemoprophylaxis with large brain mass and adjacent vasogenic edema to prevent any hemorrhagic conversion)    Spoke with patient's  at bedside.      ---Of note, this documentation is completed using the Dragon Dictation system (voice recognition software). There may be spelling and/or grammatical errors that were not corrected prior to final submission.---    Miguel Arroyo MD    HISTORY OF PRESENT ILLNESS:   Chief Complaint: R sided weakness    History Of Present Illness:    Ariane Chavis is a 48 y.o. female with a significant past medical history of severe persistent asthma, hypertension, cervical cancer (diagnosed 2015 status post radical hysterectomy with no need for chemo or radiation), melanoma (left knee status post resection, precancerous lesions of her back status post resection in 2015) a presented from home with multiple episodes of right-sided numbness and weakness.    On Saturday night into Sunday morning, patient had an episode of right lower extremity numbness and  weakness, when she was walking she felt like she was dragging her leg which lasts approximately 20 minutes in the middle of the night.  The rest of Sunday and Monday (11/5 to 11/6) were unremarkable.  However earlier today, patient had 6 complete right-sided numbness and weakness from her face downwards with symptoms lasting approximately 20 minutes again.  On my exam patient states that she does feel some numbness in her thigh however strength is back to her normal.  Patient states that her previous cancers were completely treated and eradicated, states that she was told there were low concerns for metastases at that time.    Patient with severe persistent asthma.  She has been on long courses of prednisone over the past 2 to 3 years.  Most recently was just weaned off prednisone on 10/31.    ED course:  - Vitals: Temperature 97.7, HR 87, /85, RR 18 saturating 97% on room air.  - Labs: Unremarkable CBC.  Potassium 3.3, sodium 134 otherwise unremarkable CMP.  - Imaging: CT head without contrast with a 4.1 by 3.4 cm mass in the left parietal region with adjacent vasogenic edema.    ED spoke with the transfer center and on-call neurosurgeon at [facility] Dr. Mahan and patient has been accepted for transfer to [facility] for further evaluation pending bed availability.     Review of systems: 10 point review of systems is otherwise negative except as mentioned above.    PAST HISTORIES:     Past Medical History: Persistent severe asthma, hypertension, cervical cancer (diagnosed 2015 status post radical hysterectomy, did not require chemo/radiation), melanoma (diagnosed 2015, left knee which was resected, precancerous lesions on her back which were resected, stage 0 she was told)    Past Surgical History: Total hysterectomy, bladder sling, melanoma removal      Social History: Patient denies any previous tobacco use.  Denies current alcohol and illicit drug use.  Lives at home with her , 2 sons and 3 dogs.  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  Otherwise independent with ADLs, ambulates without assistive device.  Very active, takes care of her 3 pads, assist with grandchildren as well.    Family History: Mother with history of type I DM, CHF,  from a clot that dislodged and went to her brain.  Father is alive and well, at 80 years old, no significant medical history.      Allergies: Patient has no known allergies.    OBJECTIVE:     Last Recorded Vitals:  Vitals:    23 1229 23 1906 23   BP: 180/85 117/61 (!) 196/92   BP Location: Right arm     Patient Position: Sitting     Pulse: 87 59 98   Resp: 18 18 18   Temp: 36.5 °C (97.7 °F)     TempSrc: Tympanic     SpO2: 97% 99% 96%   Weight: 116 kg (255 lb 11.7 oz)     Height: 1.676 m (5' 6\")       Last I/O:  No intake/output data recorded.    Physical Exam  Vitals reviewed.   Constitutional:       General: She is not in acute distress.     Appearance: Normal appearance. She is not toxic-appearing.   HENT:      Head: Normocephalic and atraumatic.      Nose: Nose normal.      Mouth/Throat:      Mouth: Mucous membranes are moist.   Eyes:      Extraocular Movements: Extraocular movements intact.      Conjunctiva/sclera: Conjunctivae normal.   Cardiovascular:      Rate and Rhythm: Normal rate and regular rhythm.      Pulses: Normal pulses.      Heart sounds: Normal heart sounds.   Pulmonary:      Effort: Pulmonary effort is normal. No respiratory distress.      Breath sounds: Normal breath sounds. No wheezing.   Abdominal:      General: Bowel sounds are normal. There is no distension.      Palpations: Abdomen is soft.      Tenderness: There is no abdominal tenderness. There is no guarding.   Musculoskeletal:         General: Normal range of motion.      Cervical back: Normal range of motion and neck supple.   Neurological:      General: No focal deficit present.      Mental Status: She is alert and oriented to person, place, and time. Mental status is at " baseline.      Comments: NIHSS score of 0.   Psychiatric:         Mood and Affect: Mood normal.         Behavior: Behavior normal.         Thought Content: Thought content normal.     Scheduled Medications  budesonide, 1 mg, nebulization, BID  levalbuterol, 1.25 mg, nebulization, Once  montelukast, 10 mg, oral, Nightly  potassium chloride CR, 40 mEq, oral, Daily    PRN Medications    Continuous Medications     Outpatient Medications:  Prior to Admission medications    Medication Sig Authorizing Provider   budesonide (Pulmicort) 1 mg/2 mL nebulizer solution Inhale 2 mL (1 mg) twice a day. Historical Provider, MD   levalbuterol (Xopenex) 1.25 mg/3 mL nebulizer solution Inhale 1 ampule every 4 hours if needed. Historical Provider, MD   montelukast (Singulair) 10 mg tablet Take 1 tablet (10 mg) by mouth once daily at bedtime. Historical Provider, MD     LABS AND IMAGING:     Labs:  Results from last 7 days   Lab Units 11/07/23  1447   WBC AUTO x10*3/uL 7.8   RBC AUTO x10*6/uL 4.45   HEMOGLOBIN g/dL 13.8   HEMATOCRIT % 40.0   MCV fL 90   MCH pg 31.0   MCHC g/dL 34.5   RDW % 13.5   PLATELETS AUTO x10*3/uL 320     Results from last 7 days   Lab Units 11/07/23  1447   SODIUM mmol/L 134*   POTASSIUM mmol/L 3.3*   CHLORIDE mmol/L 99   CO2 mmol/L 28   BUN mg/dL 11   CREATININE mg/dL 0.48*   GLUCOSE mg/dL 128*   PROTEIN TOTAL g/dL 7.4   CALCIUM mg/dL 9.4   BILIRUBIN TOTAL mg/dL 0.3   ALK PHOS U/L 73   AST U/L 16   ALT U/L 17         Results from last 7 days   Lab Units 11/07/23  1447   TROPHS ng/L 3       Imaging:  CT head wo IV contrast  Narrative: Interpreted By:  Kaitlin Fofana,   STUDY:  CT HEAD WO IV CONTRAST;  11/7/2023 1:21 pm      INDICATION:  TIA symptoms, right sided numbness.      COMPARISON:  None.      ACCESSION NUMBER(S):  [id number]      ORDERING CLINICIAN:  RY WILLINGHAM      TECHNIQUE:  Noncontrast axial CT scan of head was performed. Angled reformats in  brain and bone windows were generated. The images  were reviewed in  bone, brain, blood and soft tissue windows. Sagittal and coronal  reconstructions were acquired      FINDINGS:                  BRAIN PARENCHYMA: There is a large mass in the left high parietal  region which measures 4.1 x 3.4 cm. Large part of the mass is  calcified. There is adjacent vasogenic edema in the left parietal  lobe, which extends to the left occipital lobe. The mass appears to  be extra-axial though it is difficult to tell and may be related to a  meningioma.      HEMORRHAGE: None.      VENTRICLES and EXTRA-AXIAL SPACES: Normal size.      INTRACRANIAL VESSELS: No atherosclerotic calcification.      EXTRACRANIAL SOFT TISSUES: Within normal limits.      PARANASAL SINUSES/MASTOIDS: Mild mucosal thickening is seen in the  left maxillary sinus. CALVARIUM: No destructive lesion or fracture.      Impression: 4.1 cm mass in the left parietal region with adjacent vasogenic  edema. Further evaluation with an MRI of the brain is suggested.      MACRO:  Kaitlin Fofana discussed the significance and urgency of this critical  finding by telephone with  RY WILLINGHAM on 11/7/2023 at 1:54 pm.  (**-RCF-**) Findings:  See findings.              Signed by: Kaitlin Fofana 11/7/2023 1:54 PM  Dictation workstation:   [id number]      100

## 2023-11-08 NOTE — CONSULTS
HPI    Patient is a 48-year-old female with asthma, history of melanoma and cervical cancer presented to the emergency department due to complaints of right-sided numbness.  Patient states that this past Saturday she had numbness in her right leg that lasted for about half hour and then self resolved.  Patient states that Tuesday 11/7 at around 10 AM she was opening a door for her dog when she just noticed that her entire right side went numb for about 10 minutes and then she noticed that she was having a tingling sensation throughout the right side of her body.      The weakness symptoms and numbness continued in the ER and after administration of decadron she has had improvement in the upper extremity weakness but lower extremity weakness has improved.  No alteration of consciousness just some confusion associated with the anxiety of experiencing these focal neurological symptoms.     In the past 3 years there has been the appearance of headaches there were easily resolved with over the counter medication or just tolerated, otherwise no other neurological symptoms.     She has in the past been treated for cervical cancer as well as melanoma.  She follows routinely with her dermatologist.  She recounts some right breast lump that was monitored but did not require biopsy. No family history of cancer.     CT of the head shows a dural based paramedian mass with mass effect, brain compression of the left sensory and motor cortex and vasogenic edema.     During our evaluation she was being taken to MRI    Neurosurgery accepted her at [facility] and she is awaiting transfer.     Visit Vitals  /71   Pulse 87   Temp 36.6 °C (97.9 °F)   Resp 18        Neurological Exam:  GENERAL APPEARANCE: No distress, alert, interactive and cooperative.     MENTAL STATE: Orientation was normal to time, place and person. Recent and remote memory was intact. Attention span and concentration were normal. Language testing was normal  for comprehension, repetition, expression, becomes tearful but regains composure without difficulty.     CRANIAL NERVES: normal  CN 2 Visual fields full to confrontation.   CN 3, 4, 6  Pupils round, equally reactive to light. No ptosis.EOMs normal alignment, full range with normal saccades, pursuit and convergence. No nystagmus.   CN 5 Facial sensation intact bilaterally.    CN 7 Normal and symmetric facial strength. Nasolabial folds symmetric.   CN 8 Hearing intact to finger rub bilaterally.   CN 9 Palate elevates symmetrically.    CN 11 Bilaterally normal strength of shoulder shrug and neck turning.   CN 12 Tongue midline, with normal bulk and strength; no fasciculations.     MOTOR: Muscle bulk and tone were normal in both upper and lower extremities. Muscle strength was 5/5 in distal and proximal muscles in both upper and lower extremities on the left, normal strength in the right upper, mild weakness 4/5 in the right hip flexion.  Able to rise from the chair unassisted. No fasciculations, tremor or other abnormal movements were present.     REFLEXES: not tested - being taken to MRI    SENSORY: not tested - being taken to MRI    COORDINATION: Coordination exam was normal. In both upper extremities, finger-nose-finger was intact     GAIT: Able to ambulate from the chair to wheelchair about 5 feet unassisted        BMP:  Results from last 7 days   Lab Units 11/08/23  0526 11/07/23  1447   SODIUM mmol/L 136 134*   POTASSIUM mmol/L 3.5 3.3*   CHLORIDE mmol/L 98 99   CO2 mmol/L 28 28   BUN mg/dL 11 11   CREATININE mg/dL 0.43* 0.48*       CBC:  Results from last 7 days   Lab Units 11/08/23  0526 11/07/23  1447   WBC AUTO x10*3/uL 6.8 7.8   RBC AUTO x10*6/uL 4.48 4.45   HEMOGLOBIN g/dL 14.0 13.8   HEMATOCRIT % 39.8 40.0   MCV fL 89 90   MCH pg 31.3 31.0   MCHC g/dL 35.2 34.5   RDW % 13.4 13.5   PLATELETS AUTO x10*3/uL 345 320       INR:  Results from last 7 days   Lab Units 11/07/23  1447   PROTIME seconds 11.9   INR   "1.1       Lipid Profile:        No lab exists for component: \"LABVLDL\"    HgbA1C:        CMP:  Results from last 7 days   Lab Units 11/08/23  0526 11/07/23  1447   SODIUM mmol/L 136 134*   POTASSIUM mmol/L 3.5 3.3*   CHLORIDE mmol/L 98 99   CO2 mmol/L 28 28   BUN mg/dL 11 11   CREATININE mg/dL 0.43* 0.48*   GLUCOSE mg/dL 187* 128*   CALCIUM mg/dL 9.7 9.4   PROTEIN TOTAL g/dL  --  7.4   BILIRUBIN TOTAL mg/dL  --  0.3   ALK PHOS U/L  --  73   AST U/L  --  16   ALT U/L  --  17       ABG:        No lab exists for component: \"PO2\", \"PCO2\", \"HCO3\", \"BE\", \"O2SAT\"    TSH:  Lab Results   Component Value Date    TSH 2.88 11/07/2023     No results found for: \"OTUCUIEN47\"  No results found for: \"FOLATE\"  No results found for: \"VITD25\"      No MRI head results found for the past 12 months     CT head wo IV contrast    Result Date: 11/7/2023  Interpreted By:  Kaitlin Fofana, STUDY: CT HEAD WO IV CONTRAST;  11/7/2023 1:21 pm   INDICATION: TIA symptoms, right sided numbness.   COMPARISON: None.   ACCESSION NUMBER(S): [id number]   ORDERING CLINICIAN: RY WILLINGHAM   TECHNIQUE: Noncontrast axial CT scan of head was performed. Angled reformats in brain and bone windows were generated. The images were reviewed in bone, brain, blood and soft tissue windows. Sagittal and coronal reconstructions were acquired   FINDINGS:         BRAIN PARENCHYMA: There is a large mass in the left high parietal region which measures 4.1 x 3.4 cm. Large part of the mass is calcified. There is adjacent vasogenic edema in the left parietal lobe, which extends to the left occipital lobe. The mass appears to be extra-axial though it is difficult to tell and may be related to a meningioma.   HEMORRHAGE: None.   VENTRICLES and EXTRA-AXIAL SPACES: Normal size.   INTRACRANIAL VESSELS: No atherosclerotic calcification.   EXTRACRANIAL SOFT TISSUES: Within normal limits.   PARANASAL SINUSES/MASTOIDS: Mild mucosal thickening is seen in the left maxillary sinus. " CALVARIUM: No destructive lesion or fracture.       4.1 cm mass in the left parietal region with adjacent vasogenic edema. Further evaluation with an MRI of the brain is suggested.   MACRO: Kaitlin Fofana discussed the significance and urgency of this critical finding by telephone with  RY WILLINGHAM on 11/7/2023 at 1:54 pm. (**-RCF-**) Findings:  See findings.       Signed by: Kaitlin Fofana 11/7/2023 1:54 PM Dictation workstation:   [id number]      CT head wo IV contrast    Result Date: 11/7/2023  Interpreted By:  Kaitlin Fofana, STUDY: CT HEAD WO IV CONTRAST;  11/7/2023 1:21 pm   INDICATION: TIA symptoms, right sided numbness.   COMPARISON: None.   ACCESSION NUMBER(S): [id number]   ORDERING CLINICIAN: RY WILLINGHAM   TECHNIQUE: Noncontrast axial CT scan of head was performed. Angled reformats in brain and bone windows were generated. The images were reviewed in bone, brain, blood and soft tissue windows. Sagittal and coronal reconstructions were acquired   FINDINGS:         BRAIN PARENCHYMA: There is a large mass in the left high parietal region which measures 4.1 x 3.4 cm. Large part of the mass is calcified. There is adjacent vasogenic edema in the left parietal lobe, which extends to the left occipital lobe. The mass appears to be extra-axial though it is difficult to tell and may be related to a meningioma.   HEMORRHAGE: None.   VENTRICLES and EXTRA-AXIAL SPACES: Normal size.   INTRACRANIAL VESSELS: No atherosclerotic calcification.   EXTRACRANIAL SOFT TISSUES: Within normal limits.   PARANASAL SINUSES/MASTOIDS: Mild mucosal thickening is seen in the left maxillary sinus. CALVARIUM: No destructive lesion or fracture.       4.1 cm mass in the left parietal region with adjacent vasogenic edema. Further evaluation with an MRI of the brain is suggested.   MACRO: Kaitlin Fofana discussed the significance and urgency of this critical finding by telephone with  RY WILLINGHAM on 11/7/2023 at 1:54 pm. (**-RCF-**)  Findings:  See findings.       Signed by: Kaitlin Fofana 11/7/2023 1:54 PM Dictation workstation:   [id number]      EMG     No EMG results found for the past 12 months        No EEG results found for the past 12 months      Current Facility-Administered Medications:     acetaminophen (Tylenol) tablet 650 mg, 650 mg, oral, q4h PRN **OR** acetaminophen (Tylenol) oral liquid 650 mg, 650 mg, nasogastric tube, q4h PRN **OR** acetaminophen (Tylenol) suppository 650 mg, 650 mg, rectal, q4h PRN, Miguel Arroyo MD    atenolol (Tenormin) tablet 50 mg, 50 mg, oral, Daily, 50 mg at 11/08/23 0917 **AND** chlorthalidone (Hygroton) tablet 25 mg, 25 mg, oral, Daily, Miguel Arroyo MD, 25 mg at 11/08/23 0917    budesonide (Pulmicort) 0.5 mg/2 mL nebulizer solution 0.5 mg, 0.5 mg, nebulization, BID, Miguel Arroyo MD, 0.5 mg at 11/08/23 0712    dexAMETHasone (Decadron) injection 4 mg, 4 mg, intravenous, q6h, Miguel Arroyo MD, 4 mg at 11/08/23 1344    fluticasone furoate-vilanteroL (Breo Ellipta) 200-25 mcg/dose inhaler 1 puff, 1 puff, inhalation, Daily, Miguel Arroyo MD    levalbuterol (Xopenex) 1.25 mg/0.5 mL nebulizer solution 1.25 mg, 1.25 mg, nebulization, BID, Miguel Arroyo MD, 1.25 mg at 11/08/23 0712    lisinopril tablet 5 mg, 5 mg, oral, Daily, Miguel Arroyo MD, 5 mg at 11/08/23 0917    LORazepam (Ativan) injection 1 mg, 1 mg, intravenous, Once PRN, Jens Gutierrez MD    montelukast (Singulair) tablet 10 mg, 10 mg, oral, Nightly, Miguel Arroyo MD    polyethylene glycol (Glycolax, Miralax) packet 17 g, 17 g, oral, Daily PRN, Miguel Arroyo MD         Assessment:  Focal neurological symptoms of right arm weakness and numbness secondary to vasogenic edema with mass effect and brain compression from a paramedian dural based mass compressing the left sensorymotor cortex.  Initial imaging suggestive of meningioma  History of two previous malignancies (cervical and melanoma)  Asthma  Mild headaches estimated onset  2020    Recommendation:  Agree with dexamethasone 4 mg q6 IV  Agree with MRI brain  Genetics consultation should be offered at [facility] given the various neoplasias that the patient has experienced  Detailed discussion with the patient and her , nursing staff  Personal review of the CT imaging  Anticipating transfer to [facility]  Will continue to monitor clinical progress and will follow while admitted

## 2023-11-09 ENCOUNTER — APPOINTMENT (OUTPATIENT)
Dept: RADIOLOGY | Facility: HOSPITAL | Age: 48
DRG: 025 | End: 2023-11-09
Payer: COMMERCIAL

## 2023-11-09 ENCOUNTER — HOSPITAL ENCOUNTER (INPATIENT)
Facility: HOSPITAL | Age: 48
LOS: 7 days | Discharge: NURSING, REHAB OR LONG-TERM CARE | DRG: 025 | End: 2023-11-16
Attending: NEUROLOGICAL SURGERY | Admitting: NEUROLOGICAL SURGERY
Payer: COMMERCIAL

## 2023-11-09 ENCOUNTER — ANESTHESIA EVENT (OUTPATIENT)
Dept: OPERATING ROOM | Facility: HOSPITAL | Age: 48
DRG: 025 | End: 2023-11-09
Payer: COMMERCIAL

## 2023-11-09 ENCOUNTER — PREP FOR PROCEDURE (OUTPATIENT)
Dept: VASCULAR SURGERY | Facility: HOSPITAL | Age: 48
End: 2023-11-09

## 2023-11-09 DIAGNOSIS — Z79.899 ON DEEP VEIN THROMBOSIS (DVT) PROPHYLAXIS: ICD-10-CM

## 2023-11-09 DIAGNOSIS — R60.0 LOCALIZED EDEMA: ICD-10-CM

## 2023-11-09 DIAGNOSIS — D32.9 MENINGIOMA (MULTI): Primary | ICD-10-CM

## 2023-11-09 DIAGNOSIS — L50.9 URTICARIA: ICD-10-CM

## 2023-11-09 DIAGNOSIS — G93.89 BRAIN MASS: ICD-10-CM

## 2023-11-09 DIAGNOSIS — I15.9 SECONDARY HYPERTENSION: ICD-10-CM

## 2023-11-09 PROBLEM — I10 HTN (HYPERTENSION): Status: ACTIVE | Noted: 2023-11-09

## 2023-11-09 PROBLEM — J45.909 SEVERE ASTHMA (HHS-HCC): Status: ACTIVE | Noted: 2023-11-09

## 2023-11-09 PROBLEM — E66.9 OBESITY: Status: ACTIVE | Noted: 2023-11-09

## 2023-11-09 LAB
ABO GROUP (TYPE) IN BLOOD: NORMAL
ALBUMIN SERPL BCP-MCNC: 3.7 G/DL (ref 3.4–5)
ANION GAP SERPL CALC-SCNC: 13 MMOL/L (ref 10–20)
ANTIBODY SCREEN: NORMAL
APTT PPP: 30 SECONDS (ref 27–38)
BUN SERPL-MCNC: 15 MG/DL (ref 6–23)
CALCIUM SERPL-MCNC: 9.5 MG/DL (ref 8.6–10.6)
CHLORIDE SERPL-SCNC: 101 MMOL/L (ref 98–107)
CO2 SERPL-SCNC: 29 MMOL/L (ref 21–32)
CREAT SERPL-MCNC: 0.4 MG/DL (ref 0.5–1.05)
ERYTHROCYTE [DISTWIDTH] IN BLOOD BY AUTOMATED COUNT: 13.7 % (ref 11.5–14.5)
GFR SERPL CREATININE-BSD FRML MDRD: >90 ML/MIN/1.73M*2
GLUCOSE SERPL-MCNC: 137 MG/DL (ref 74–99)
HCG UR QL IA.RAPID: NEGATIVE
HCT VFR BLD AUTO: 37 % (ref 36–46)
HGB BLD-MCNC: 12.6 G/DL (ref 12–16)
INR PPP: 1.1 (ref 0.9–1.1)
MCH RBC QN AUTO: 31.2 PG (ref 26–34)
MCHC RBC AUTO-ENTMCNC: 34.1 G/DL (ref 32–36)
MCV RBC AUTO: 92 FL (ref 80–100)
NRBC BLD-RTO: 0 /100 WBCS (ref 0–0)
PHOSPHATE SERPL-MCNC: 3.1 MG/DL (ref 2.5–4.9)
PLATELET # BLD AUTO: 344 X10*3/UL (ref 150–450)
POTASSIUM SERPL-SCNC: 3.5 MMOL/L (ref 3.5–5.3)
PROTHROMBIN TIME: 12.3 SECONDS (ref 9.8–12.8)
RBC # BLD AUTO: 4.04 X10*6/UL (ref 4–5.2)
RBC #/AREA URNS AUTO: NORMAL /HPF
RH FACTOR (ANTIGEN D): NORMAL
SODIUM SERPL-SCNC: 139 MMOL/L (ref 136–145)
SQUAMOUS #/AREA URNS AUTO: NORMAL /HPF
WBC # BLD AUTO: 8.7 X10*3/UL (ref 4.4–11.3)
WBC #/AREA URNS AUTO: NORMAL /HPF

## 2023-11-09 PROCEDURE — 71045 X-RAY EXAM CHEST 1 VIEW: CPT | Performed by: RADIOLOGY

## 2023-11-09 PROCEDURE — 86901 BLOOD TYPING SEROLOGIC RH(D): CPT | Performed by: STUDENT IN AN ORGANIZED HEALTH CARE EDUCATION/TRAINING PROGRAM

## 2023-11-09 PROCEDURE — 2550000001 HC RX 255 CONTRASTS: Performed by: NEUROLOGICAL SURGERY

## 2023-11-09 PROCEDURE — 85027 COMPLETE CBC AUTOMATED: CPT | Performed by: STUDENT IN AN ORGANIZED HEALTH CARE EDUCATION/TRAINING PROGRAM

## 2023-11-09 PROCEDURE — 81025 URINE PREGNANCY TEST: CPT | Performed by: STUDENT IN AN ORGANIZED HEALTH CARE EDUCATION/TRAINING PROGRAM

## 2023-11-09 PROCEDURE — 2500000004 HC RX 250 GENERAL PHARMACY W/ HCPCS (ALT 636 FOR OP/ED): Performed by: NURSE PRACTITIONER

## 2023-11-09 PROCEDURE — 74177 CT ABD & PELVIS W/CONTRAST: CPT

## 2023-11-09 PROCEDURE — 2500000001 HC RX 250 WO HCPCS SELF ADMINISTERED DRUGS (ALT 637 FOR MEDICARE OP): Performed by: STUDENT IN AN ORGANIZED HEALTH CARE EDUCATION/TRAINING PROGRAM

## 2023-11-09 PROCEDURE — 2500000002 HC RX 250 W HCPCS SELF ADMINISTERED DRUGS (ALT 637 FOR MEDICARE OP, ALT 636 FOR OP/ED): Performed by: STUDENT IN AN ORGANIZED HEALTH CARE EDUCATION/TRAINING PROGRAM

## 2023-11-09 PROCEDURE — 2500000004 HC RX 250 GENERAL PHARMACY W/ HCPCS (ALT 636 FOR OP/ED)

## 2023-11-09 PROCEDURE — 74177 CT ABD & PELVIS W/CONTRAST: CPT | Performed by: RADIOLOGY

## 2023-11-09 PROCEDURE — 96372 THER/PROPH/DIAG INJ SC/IM: CPT

## 2023-11-09 PROCEDURE — 70544 MR ANGIOGRAPHY HEAD W/O DYE: CPT | Performed by: RADIOLOGY

## 2023-11-09 PROCEDURE — 93010 ELECTROCARDIOGRAM REPORT: CPT | Performed by: INTERNAL MEDICINE

## 2023-11-09 PROCEDURE — 36415 COLL VENOUS BLD VENIPUNCTURE: CPT | Performed by: STUDENT IN AN ORGANIZED HEALTH CARE EDUCATION/TRAINING PROGRAM

## 2023-11-09 PROCEDURE — 94760 N-INVAS EAR/PLS OXIMETRY 1: CPT

## 2023-11-09 PROCEDURE — 71045 X-RAY EXAM CHEST 1 VIEW: CPT | Mod: FY

## 2023-11-09 PROCEDURE — 99223 1ST HOSP IP/OBS HIGH 75: CPT

## 2023-11-09 PROCEDURE — 85730 THROMBOPLASTIN TIME PARTIAL: CPT | Performed by: STUDENT IN AN ORGANIZED HEALTH CARE EDUCATION/TRAINING PROGRAM

## 2023-11-09 PROCEDURE — 94640 AIRWAY INHALATION TREATMENT: CPT

## 2023-11-09 PROCEDURE — 99221 1ST HOSP IP/OBS SF/LOW 40: CPT | Performed by: STUDENT IN AN ORGANIZED HEALTH CARE EDUCATION/TRAINING PROGRAM

## 2023-11-09 PROCEDURE — 96372 THER/PROPH/DIAG INJ SC/IM: CPT | Performed by: STUDENT IN AN ORGANIZED HEALTH CARE EDUCATION/TRAINING PROGRAM

## 2023-11-09 PROCEDURE — 71260 CT THORAX DX C+: CPT | Performed by: RADIOLOGY

## 2023-11-09 PROCEDURE — 1100000001 HC PRIVATE ROOM DAILY

## 2023-11-09 PROCEDURE — 84100 ASSAY OF PHOSPHORUS: CPT | Performed by: STUDENT IN AN ORGANIZED HEALTH CARE EDUCATION/TRAINING PROGRAM

## 2023-11-09 PROCEDURE — A9575 INJ GADOTERATE MEGLUMI 0.1ML: HCPCS | Performed by: NEUROLOGICAL SURGERY

## 2023-11-09 PROCEDURE — 70546 MR ANGIOGRAPH HEAD W/O&W/DYE: CPT

## 2023-11-09 PROCEDURE — 81001 URINALYSIS AUTO W/SCOPE: CPT | Performed by: STUDENT IN AN ORGANIZED HEALTH CARE EDUCATION/TRAINING PROGRAM

## 2023-11-09 PROCEDURE — 2500000004 HC RX 250 GENERAL PHARMACY W/ HCPCS (ALT 636 FOR OP/ED): Performed by: STUDENT IN AN ORGANIZED HEALTH CARE EDUCATION/TRAINING PROGRAM

## 2023-11-09 RX ORDER — PREDNISONE 20 MG/1
40 TABLET ORAL DAILY
Status: DISCONTINUED | OUTPATIENT
Start: 2023-11-09 | End: 2023-11-10

## 2023-11-09 RX ORDER — ATENOLOL AND CHLORTHALIDONE TABLET 50; 25 MG/1; MG/1
1 TABLET ORAL DAILY
Status: DISCONTINUED | OUTPATIENT
Start: 2023-11-09 | End: 2023-11-09

## 2023-11-09 RX ORDER — SODIUM CHLORIDE 9 MG/ML
75 INJECTION, SOLUTION INTRAVENOUS CONTINUOUS
Status: DISCONTINUED | OUTPATIENT
Start: 2023-11-09 | End: 2023-11-10

## 2023-11-09 RX ORDER — POLYETHYLENE GLYCOL 3350 17 G/17G
17 POWDER, FOR SOLUTION ORAL DAILY
Status: DISCONTINUED | OUTPATIENT
Start: 2023-11-09 | End: 2023-11-17 | Stop reason: HOSPADM

## 2023-11-09 RX ORDER — FLUTICASONE PROPIONATE 50 MCG
1 SPRAY, SUSPENSION (ML) NASAL DAILY
COMMUNITY

## 2023-11-09 RX ORDER — GADOTERATE MEGLUMINE 376.9 MG/ML
20 INJECTION INTRAVENOUS
Status: COMPLETED | OUTPATIENT
Start: 2023-11-09 | End: 2023-11-09

## 2023-11-09 RX ORDER — BUDESONIDE 0.25 MG/2ML
0.25 INHALANT ORAL
Status: DISCONTINUED | OUTPATIENT
Start: 2023-11-09 | End: 2023-11-12

## 2023-11-09 RX ORDER — ONDANSETRON 4 MG/1
4 TABLET, ORALLY DISINTEGRATING ORAL EVERY 8 HOURS PRN
Status: DISCONTINUED | OUTPATIENT
Start: 2023-11-09 | End: 2023-11-17 | Stop reason: HOSPADM

## 2023-11-09 RX ORDER — ATENOLOL 50 MG/1
50 TABLET ORAL DAILY
Status: DISCONTINUED | OUTPATIENT
Start: 2023-11-09 | End: 2023-11-17 | Stop reason: HOSPADM

## 2023-11-09 RX ORDER — LISINOPRIL 5 MG/1
5 TABLET ORAL DAILY
Status: DISCONTINUED | OUTPATIENT
Start: 2023-11-09 | End: 2023-11-17 | Stop reason: HOSPADM

## 2023-11-09 RX ORDER — TRIAMCINOLONE ACETONIDE 1 MG/G
1 OINTMENT TOPICAL 2 TIMES DAILY PRN
COMMUNITY

## 2023-11-09 RX ORDER — ALBUTEROL SULFATE 0.83 MG/ML
1.25 SOLUTION RESPIRATORY (INHALATION)
Status: DISCONTINUED | OUTPATIENT
Start: 2023-11-09 | End: 2023-11-12

## 2023-11-09 RX ORDER — FLUTICASONE FUROATE AND VILANTEROL 100; 25 UG/1; UG/1
1 POWDER RESPIRATORY (INHALATION)
Status: DISCONTINUED | OUTPATIENT
Start: 2023-11-09 | End: 2023-11-17 | Stop reason: HOSPADM

## 2023-11-09 RX ORDER — CETIRIZINE HYDROCHLORIDE 5 MG/1
5 TABLET ORAL DAILY
COMMUNITY

## 2023-11-09 RX ORDER — MONTELUKAST SODIUM 10 MG/1
10 TABLET ORAL NIGHTLY
Status: DISCONTINUED | OUTPATIENT
Start: 2023-11-09 | End: 2023-11-17 | Stop reason: HOSPADM

## 2023-11-09 RX ORDER — ACETAMINOPHEN 325 MG/1
650 TABLET ORAL EVERY 6 HOURS PRN
Status: DISCONTINUED | OUTPATIENT
Start: 2023-11-09 | End: 2023-11-17 | Stop reason: HOSPADM

## 2023-11-09 RX ORDER — TALC
3 POWDER (GRAM) TOPICAL DAILY
Status: DISCONTINUED | OUTPATIENT
Start: 2023-11-09 | End: 2023-11-17 | Stop reason: HOSPADM

## 2023-11-09 RX ORDER — HEPARIN SODIUM 5000 [USP'U]/ML
7500 INJECTION, SOLUTION INTRAVENOUS; SUBCUTANEOUS EVERY 8 HOURS SCHEDULED
Status: COMPLETED | OUTPATIENT
Start: 2023-11-09 | End: 2023-11-09

## 2023-11-09 RX ORDER — LEVETIRACETAM 500 MG/1
500 TABLET ORAL 2 TIMES DAILY
Status: DISCONTINUED | OUTPATIENT
Start: 2023-11-09 | End: 2023-11-17 | Stop reason: HOSPADM

## 2023-11-09 RX ORDER — ONDANSETRON HYDROCHLORIDE 2 MG/ML
4 INJECTION, SOLUTION INTRAVENOUS EVERY 8 HOURS PRN
Status: DISCONTINUED | OUTPATIENT
Start: 2023-11-09 | End: 2023-11-17 | Stop reason: HOSPADM

## 2023-11-09 RX ORDER — AZELASTINE HYDROCHLORIDE 0.5 MG/ML
1 SOLUTION/ DROPS OPHTHALMIC 2 TIMES DAILY PRN
COMMUNITY

## 2023-11-09 RX ORDER — NALOXONE HYDROCHLORIDE 0.4 MG/ML
0.2 INJECTION, SOLUTION INTRAMUSCULAR; INTRAVENOUS; SUBCUTANEOUS EVERY 5 MIN PRN
Status: DISCONTINUED | OUTPATIENT
Start: 2023-11-09 | End: 2023-11-17 | Stop reason: HOSPADM

## 2023-11-09 RX ORDER — PREDNISONE 5 MG/1
5 TABLET ORAL DAILY PRN
COMMUNITY
End: 2023-11-17 | Stop reason: HOSPADM

## 2023-11-09 RX ORDER — AMMONIUM LACTATE 12 G/100G
1 CREAM TOPICAL AS NEEDED
COMMUNITY

## 2023-11-09 RX ORDER — HYDRALAZINE HYDROCHLORIDE 25 MG/1
25 TABLET, FILM COATED ORAL EVERY 6 HOURS PRN
Status: DISCONTINUED | OUTPATIENT
Start: 2023-11-09 | End: 2023-11-17 | Stop reason: HOSPADM

## 2023-11-09 RX ORDER — POLYETHYLENE GLYCOL 3350 17 G/17G
17 POWDER, FOR SOLUTION ORAL DAILY
COMMUNITY

## 2023-11-09 RX ORDER — ALBUTEROL SULFATE 90 UG/1
2 AEROSOL, METERED RESPIRATORY (INHALATION) EVERY 6 HOURS PRN
COMMUNITY
End: 2024-04-08 | Stop reason: SDUPTHER

## 2023-11-09 RX ORDER — AMOXICILLIN 250 MG
2 CAPSULE ORAL 2 TIMES DAILY
Status: DISCONTINUED | OUTPATIENT
Start: 2023-11-09 | End: 2023-11-17 | Stop reason: HOSPADM

## 2023-11-09 RX ORDER — OXYCODONE HYDROCHLORIDE 5 MG/1
2.5 TABLET ORAL EVERY 4 HOURS PRN
Status: DISCONTINUED | OUTPATIENT
Start: 2023-11-09 | End: 2023-11-12

## 2023-11-09 RX ORDER — CHLORTHALIDONE 25 MG/1
25 TABLET ORAL DAILY
Status: DISCONTINUED | OUTPATIENT
Start: 2023-11-09 | End: 2023-11-17 | Stop reason: HOSPADM

## 2023-11-09 RX ADMIN — SODIUM CHLORIDE 75 ML/HR: 9 INJECTION, SOLUTION INTRAVENOUS at 06:52

## 2023-11-09 RX ADMIN — MONTELUKAST SODIUM 10 MG: 10 TABLET, FILM COATED ORAL at 21:59

## 2023-11-09 RX ADMIN — SENNOSIDES AND DOCUSATE SODIUM 2 TABLET: 8.6; 5 TABLET ORAL at 21:58

## 2023-11-09 RX ADMIN — SENNOSIDES AND DOCUSATE SODIUM 2 TABLET: 8.6; 5 TABLET ORAL at 09:35

## 2023-11-09 RX ADMIN — BUDESONIDE 0.25 MG: 0.25 INHALANT RESPIRATORY (INHALATION) at 10:51

## 2023-11-09 RX ADMIN — LISINOPRIL 5 MG: 5 TABLET ORAL at 09:35

## 2023-11-09 RX ADMIN — LEVETIRACETAM 500 MG: 250 TABLET, FILM COATED ORAL at 09:35

## 2023-11-09 RX ADMIN — HEPARIN SODIUM 7500 UNITS: 5000 INJECTION INTRAVENOUS; SUBCUTANEOUS at 21:58

## 2023-11-09 RX ADMIN — IOHEXOL 90 ML: 350 INJECTION, SOLUTION INTRAVENOUS at 20:16

## 2023-11-09 RX ADMIN — PREDNISONE 40 MG: 20 TABLET ORAL at 17:01

## 2023-11-09 RX ADMIN — HEPARIN SODIUM 7500 UNITS: 5000 INJECTION INTRAVENOUS; SUBCUTANEOUS at 09:35

## 2023-11-09 RX ADMIN — TIOTROPIUM BROMIDE INHALATION SPRAY 2 PUFF: 3.12 SPRAY, METERED RESPIRATORY (INHALATION) at 10:55

## 2023-11-09 RX ADMIN — Medication 3 MG: at 18:01

## 2023-11-09 RX ADMIN — POLYETHYLENE GLYCOL 3350 17 G: 17 POWDER, FOR SOLUTION ORAL at 21:59

## 2023-11-09 RX ADMIN — ALBUTEROL SULFATE 2.5 MG: 2.5 SOLUTION RESPIRATORY (INHALATION) at 16:24

## 2023-11-09 RX ADMIN — SODIUM CHLORIDE 75 ML/HR: 9 INJECTION, SOLUTION INTRAVENOUS at 17:51

## 2023-11-09 RX ADMIN — ALBUTEROL SULFATE 1.25 MG: 2.5 SOLUTION RESPIRATORY (INHALATION) at 22:36

## 2023-11-09 RX ADMIN — LEVETIRACETAM 500 MG: 250 TABLET, FILM COATED ORAL at 21:58

## 2023-11-09 RX ADMIN — CHLORTHALIDONE 25 MG: 25 TABLET ORAL at 09:35

## 2023-11-09 RX ADMIN — GADOTERATE MEGLUMINE 20 ML: 376.9 INJECTION INTRAVENOUS at 21:24

## 2023-11-09 RX ADMIN — ALBUTEROL SULFATE 1.25 MG: 2.5 SOLUTION RESPIRATORY (INHALATION) at 10:41

## 2023-11-09 RX ADMIN — HEPARIN SODIUM 7500 UNITS: 5000 INJECTION INTRAVENOUS; SUBCUTANEOUS at 13:16

## 2023-11-09 RX ADMIN — ATENOLOL 50 MG: 50 TABLET ORAL at 09:35

## 2023-11-09 ASSESSMENT — COGNITIVE AND FUNCTIONAL STATUS - GENERAL
DAILY ACTIVITIY SCORE: 24
MOBILITY SCORE: 24
MOBILITY SCORE: 24
DAILY ACTIVITIY SCORE: 24
DAILY ACTIVITIY SCORE: 24
MOBILITY SCORE: 24

## 2023-11-09 ASSESSMENT — PAIN SCALES - GENERAL
PAINLEVEL_OUTOF10: 0 - NO PAIN

## 2023-11-09 ASSESSMENT — PAIN - FUNCTIONAL ASSESSMENT
PAIN_FUNCTIONAL_ASSESSMENT: 0-10

## 2023-11-09 ASSESSMENT — ACTIVITIES OF DAILY LIVING (ADL): LACK_OF_TRANSPORTATION: NO

## 2023-11-09 NOTE — CARE PLAN
The patient's goals for the shift include      The clinical goals for the shift include Patient will remain free from injury throughout shift.    Problem: Discharge Planning  Goal: Discharge to home or other facility with appropriate resources  Outcome: Progressing     Problem: Chronic Conditions and Co-morbidities  Goal: Patient's chronic conditions and co-morbidity symptoms are monitored and maintained or improved  Outcome: Progressing

## 2023-11-09 NOTE — CARE PLAN
The patient's goals for the shift include      The clinical goals for the shift include Patient will remain free from injury throughout shift.    Over the shift, the patient did make progress toward the aforementioned goals.

## 2023-11-09 NOTE — H&P
History Of Present Illness  Ariane Chavis is a 48 y.o. female w h/o HTN, melanoma (knee, removed), cervical cancer (in remission), presenting with 2 episodes of transient L side weakness and numbness. Pt states she was suddenly unable to move her leg, and her arm felt weak, and this resolved shortly afterwards. Pt denies any headaches, nausea/vomiting, or any other symptoms. Patient currently feels back to baseline. CTH concerning for brain mass for which she was transferred to .    The following imaging personally reviewed:   CTH - left parietal extra-axial calcified mass  MRI brain - left parietal dural-based lesion with surrounding vasogenic edema, c/w meningioma, without brain compression or midline shift     Past Medical History  Past Medical History:   Diagnosis Date    Acute candidiasis of vulva and vagina 07/01/2016    Yeast vaginitis    Acute laryngitis 05/29/2017    Acute laryngitis    Acute sinusitis, unspecified 03/29/2020    Acute rhinosinusitis    Acute tonsillitis, unspecified 10/20/2016    Acute tonsillitis    Adjustment disorder with mixed anxiety and depressed mood 03/02/2016    Adjustment reaction with anxiety and depression    Body mass index (BMI)40.0-44.9, adult 03/29/2020    Body mass index (BMI) of 40.0 to 44.9 in adult    Body mass index (BMI)40.0-44.9, adult 01/18/2020    Body mass index (BMI) of 40.0 to 44.9 in adult    Contact with and (suspected) exposure to unspecified communicable disease 03/24/2020    Exposure to communicable diseases    Enlarged lymph nodes, unspecified 03/21/2016    Glands swollen    Essential (primary) hypertension 09/05/2014    Benign essential hypertension    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    Facial twitching    Mild intermittent asthma with (acute) exacerbation 01/11/2017    Mild intermittent reactive airway disease with acute exacerbation    Other conditions influencing health status 05/29/2017    History of cough    Other conditions influencing health status      History of pregnancy    Personal history of malignant melanoma of skin 12/15/2021    History of malignant melanoma    Personal history of malignant neoplasm of cervix uteri     History of malignant neoplasm of cervix uteri    Personal history of other diseases of the circulatory system 10/25/2014    History of varicose veins    Personal history of other diseases of the circulatory system     Personal history of supraventricular tachycardia    Personal history of other diseases of the female genital tract 02/06/2015    History of dysfunctional uterine bleeding    Personal history of other diseases of the female genital tract 12/22/2017    History of abnormal cervical Papanicolaou smear    Personal history of other diseases of the respiratory system 10/18/2016    History of pharyngitis    Personal history of other diseases of the respiratory system 04/03/2019    History of acute bronchitis with bronchospasm    Personal history of other diseases of the respiratory system 05/29/2017    History of acute sinusitis    Personal history of other diseases of the respiratory system 12/22/2017    History of acute bronchitis with bronchospasm    Personal history of other endocrine, nutritional and metabolic disease 01/16/2019    History of obesity    Personal history of other specified conditions 03/02/2016    History of lymphadenopathy    Personal history of other specified conditions     History of abnormal Pap smear    Unspecified asthma with (acute) exacerbation 03/31/2019    Acute asthma exacerbation    Unspecified open wound of other finger without damage to nail, initial encounter 11/26/2019    Avulsion of skin of index finger, initial encounter    Urinary tract infection, site not specified 01/16/2019    Frequent UTI    Urinary tract infection, site not specified 06/28/2016    Acute UTI       Surgical History  Past Surgical History:   Procedure Laterality Date    CERVICAL BIOPSY  W/ LOOP ELECTRODE EXCISION  10/25/2014     "Cervical Loop Electrosurgical Excision (LEEP)    HYSTERECTOMY  05/19/2015    Hysterectomy    OTHER SURGICAL HISTORY  03/02/2016    Vaginal Surg Insertion Of Mesh For Pelvic Floor Repair    OTHER SURGICAL HISTORY  10/25/2014    Biopsy Skin        Social History  She reports that she has never smoked. She has never used smokeless tobacco. She reports that she does not drink alcohol and does not use drugs.    Family History  No family history on file.     Allergies  Grass pollen    Review of Systems  Negative except as listed in HPI     Physical Exam  GEN: Healthy appearing, well-developed, NAD  PSYCH: AOx3. Normal memory, mood, and affect  HEENT:?-Head: NC/AT;?-Eyes: No discharge or redness; Ears: External ears are normal.?-Nose: Normal nares.?-Mouth and throat: MMM. Normal gums, mucosa, palate,. Good dentition.?  CV: well perfused   LUNGS: breathing comfortably on room air   ABD: Soft, NT/ND, NBS, no masses or organomegaly.?  : N/A?SKIN: Warm, well perfused. No skin rashes or abnormal lesions.?  MSK: Normal gait. No deformities.?  Neuro:   Awake, Ox3   Face symmetric  Fcx4 with symmetric strength        Last Recorded Vitals  Blood pressure 119/74, pulse 83, temperature 36 °C (96.8 °F), resp. rate 20, height 1.651 m (5' 5\"), weight 114 kg (250 lb 10.6 oz), SpO2 94 %.    Relevant Results         Assessment/Plan   Principal Problem:    Meningioma (CMS/HCC)    Ariane Chavis is a 48 y.o. female w h/o HTN, melanoma (knee, removed), cervical cancer (in remission), presenting with 2 episodes of transient L side weakness and numbness. Pt states she was suddenly unable to move her leg, and her arm felt weak, and this resolved shortly afterwards. Pt denies any headaches, nausea/vomiting, or any other symptoms. Patient currently feels back to baseline. CTH - left parietal extra-axial calcified mass  MRI brain - left parietal dural-based lesion with surrounding vasogenic edema, c/w meningioma, without brain compression or " midline shift     PLAN  Floor  Q4 hour neuro checks  CT chest abdomen pelvis  MRVenogram given sinus involvement  Medicine consult for OR planning  Keppra 500BID, EEG  Dex4q6  Plan for OR after medical clearance and OR availability  Ok for diet  SCDs/SQH      Mary Guillen MD

## 2023-11-09 NOTE — PROGRESS NOTES
Transitional Care Coordination Progress Note:   Patient discussed during interdisciplinary rounds.   Team members present: RN TCC SW  Plan per Medical/Surgical team: HTN, melanoma (knee, removed), cervical cancer   Discharge disposition: TBD   Status-Inpatient   Payer- Payor: [employer] / Plan: [employer] / Product Type: *No Product type* /    Potential Barriers: None   ADOD: 11.11.2023     Jose Carlos Rolon RN [facility] [phone]

## 2023-11-09 NOTE — NURSING NOTE
[facility] paramedics here and updated. Patient discharged with all belongings and in stable condition.

## 2023-11-09 NOTE — PROGRESS NOTES
Pharmacy Medication History Review    Ariane Chavis is a 48 y.o. female admitted for Meningioma (CMS/formerly [facility]). Pharmacy reviewed the patient's [id number] medications and allergies for accuracy.    The list below reflects the updated PTA list. Please review each medication in order reconciliation for additional clarification and justification.  Medications Prior to Admission   Medication Sig Dispense Refill Last Dose    albuterol 90 mcg/actuation inhaler Inhale 2 puffs every 6 hours if needed for wheezing.       ammonium lactate (Amlactin) 12 % cream Apply 1 Application topically if needed for dry skin.       atenoloL-chlorthalidone (Tenoretic) 50-25 mg tablet Take 1 tablet by mouth once daily.   11/8/2023    azelastine (Optivar) 0.05 % ophthalmic solution Administer 1 drop into both eyes 2 times a day as needed (allergies).       budesonide (Pulmicort) 1 mg/2 mL nebulizer solution Inhale 2 mL (1 mg) twice a day.   11/8/2023    cetirizine (ZyrTEC) 5 mg tablet Take 1 tablet (5 mg) by mouth once daily.       fluticasone (Flonase) 50 mcg/actuation nasal spray Administer 1 spray into each nostril once daily. Shake gently. Before first use, prime pump. After use, clean tip and replace cap.       fluticasone-umeclidin-vilanter (Trelegy Ellipta) 200-62.5-25 mcg blister with device Inhale 1 puff once daily.   11/8/2023    levalbuterol (Xopenex) 1.25 mg/3 mL nebulizer solution Inhale 1 ampule every 4 hours if needed for wheezing or shortness of breath.   11/8/2023    lisinopril 20 mg tablet Take 1 tablet (20 mg) by mouth once daily.   11/8/2023    montelukast (Singulair) 10 mg tablet Take 1 tablet (10 mg) by mouth once daily at bedtime.   11/8/2023    omalizumab (Xolair) 150 mg/mL injection Inject 1 mL (150 mg) under the skin every 28 (twenty-eight) days. October 15th last dose   Past Month    polyethylene glycol (Glycolax, Miralax) 17 gram packet Take 17 g by mouth once daily.       predniSONE (Deltasone) 5 mg tablet  Take 1 tablet (5 mg) by mouth once daily as needed (asthma flares).       triamcinolone (Kenalog) 0.1 % ointment Apply 1 Application topically 2 times a day as needed for rash.           The list below reflectives the updated allergy list. Please review each documented allergy for additional clarification and justification.  Allergies  Reviewed by Eulalia Sneior PharmD on 11/9/2023        Severity Reactions Comments    Grass Pollen Not Specified Wheezing           Sources used: Pharmacy dispense history, OARRs, patient interview (good historian- knew drug dose and frequency), and [facility] pulmonary note from 10/23    Below are additional concerns with the patient's PTA list.  -pt is in the process of switching from xolair to dupixent  -pt mixes xopenex with the pulmicort within the neb machine; prefers xopenex since albuterol causes pt to be tachycardic   -pt states she is constantly constipation therefore takes miralax daily  -pt has PRN prednisone - self titrates to 15 mg and then will taper if pt has an asthma flare    Eulalia Senior PharmD  Transitions of Care Pharmacist  Medication reconciliation complete  Please reach out via [employer] secure chat for questions, or if no response call Trippy Bandz or tagUin   [employer]

## 2023-11-09 NOTE — NURSING NOTE
Patient alert and oriented, sitting up in bed and crying uncontrollably, spouse is at bedside. Patient requesting something for anxiety. Medicated patient with xanax 0.5 mg. Support given. Called [facility] ambulance to ask ETA and they said they are on the way. Patient and her spouse updated.

## 2023-11-09 NOTE — CONSULTS
Reason For Consult  Perioperative Optimization    History Of Present Illness  Ariane Chavis is a 48 y.o. female presenting with a PMH of severe persistent asthma, HTN, cervical cancer (diagnosed 2015 status post radical hysterectomy with no need for chemo or radiation), melanoma (left knee, status post resection), precancerous lesions of her back status post resection in 2015) who initially presented at OSH from home with multiple episodes of right-sided numbness and weakness which started on 11/05. Patient had another episode on 11/07 of complete right-sided numbness and weakness from her face downwards with symptoms lasting approximately 20 minutes again.       MRI was done which showed 4 cm homogeneous enhancing calcified extra-axial mass with left parietal convexity that is consistent with a meningioma.  There is also some concern of extension and likely obstruction of the superior sagittal sinus as well as leptomeningeal enhancement worsening extension to the leptomeninges and mild mass effect. No acute cortical infarct or intracranial hemorrhage.     Medicine was consulted for perioperative optimization     Home meds:  Lisinopril 5mg   Atenolol-chlorthalidone 50-25  Trelegy 200 1 puff daily   budesonide nebs 1-2 times per day prn, albuterol as needed  Montelukast daily  Zyrtec daily  Omalizumab  Flonase      Past Medical History  As stated in HPI    Surgical History  She has a past surgical history that includes Other surgical history (03/02/2016); Hysterectomy (05/19/2015); Other surgical history (10/25/2014); and Cervical biopsy w/ loop electrode excision (10/25/2014).     Social History  She reports that she has never smoked. She has never used smokeless tobacco. She reports that she does not drink alcohol and does not use drugs.    Family History  No family history on file.     Allergies  Grass pollen    Review of Systems  As stated in HPI     Physical Exam  Physical Exam  General: He is not in acute  "distress. Resting comfortably   Neurological: AO x3. No focal deficits.   Cardiovascular: RRR, normal S1 and S2. No murmurs appreciated.  Pulmonary: Pulmonary effort is normal. No respiratory distress. Clear breath sounds bilaterally.   Abdominal: Abdomen is soft. There is no abdominal tenderness. + BS  Extremities: Chronic Bilateral, non pitting LE edema. Motor and sensory functions in tact bilaterally.   Skin: warm, well perfused.   Psychiatric: Mood normal.        Last Recorded Vitals  Blood pressure 133/79, pulse 87, temperature 35.7 °C (96.3 °F), resp. rate 20, height 1.651 m (5' 5\"), weight 114 kg (250 lb 10.6 oz), SpO2 95 %.    Relevant Results  Scheduled medications  albuterol, 1.25 mg, nebulization, TID  atenolol, 50 mg, oral, Daily  budesonide, 0.25 mg, nebulization, Daily  chlorthalidone, 25 mg, oral, Daily  tiotropium, 2 Inhalation, inhalation, Daily   And  fluticasone furoate-vilanteroL, 1 puff, inhalation, Daily  heparin (porcine), 7,500 Units, subcutaneous, q8h PHONG  levETIRAcetam, 500 mg, oral, BID  lisinopril, 5 mg, oral, Daily  melatonin, 3 mg, oral, Daily  montelukast, 10 mg, oral, Nightly  polyethylene glycol, 17 g, oral, Daily  sennosides-docusate sodium, 2 tablet, oral, BID      Continuous medications  sodium chloride 0.9%, 75 mL/hr, Last Rate: 75 mL/hr (11/09/23 1105)      PRN medications  PRN medications: acetaminophen, naloxone, ondansetron ODT **OR** ondansetron, oxyCODONE  Results for orders placed or performed during the hospital encounter of 11/09/23 (from the past 24 hour(s))   Microscopic Only, Urine   Result Value Ref Range    WBC, Urine NONE 1-5, NONE /HPF    RBC, Urine NONE NONE, 1-2, 3-5 /HPF    Squamous Epithelial Cells, Urine 1-9 (SPARSE) Reference range not established. /HPF   hCG, Urine, Qualitative   Result Value Ref Range    HCG, Urine NEGATIVE NEGATIVE   Coagulation Screen   Result Value Ref Range    Protime 12.3 9.8 - 12.8 seconds    INR 1.1 0.9 - 1.1    aPTT 30 27 - 38 " seconds   CBC   Result Value Ref Range    WBC 8.7 4.4 - 11.3 x10*3/uL    nRBC 0.0 0.0 - 0.0 /100 WBCs    RBC 4.04 4.00 - 5.20 x10*6/uL    Hemoglobin 12.6 12.0 - 16.0 g/dL    Hematocrit 37.0 36.0 - 46.0 %    MCV 92 80 - 100 fL    MCH 31.2 26.0 - 34.0 pg    MCHC 34.1 32.0 - 36.0 g/dL    RDW 13.7 11.5 - 14.5 %    Platelets 344 150 - 450 x10*3/uL   Renal Function Panel   Result Value Ref Range    Glucose 137 (H) 74 - 99 mg/dL    Sodium 139 136 - 145 mmol/L    Potassium 3.5 3.5 - 5.3 mmol/L    Chloride 101 98 - 107 mmol/L    Bicarbonate 29 21 - 32 mmol/L    Anion Gap 13 10 - 20 mmol/L    Urea Nitrogen 15 6 - 23 mg/dL    Creatinine 0.40 (L) 0.50 - 1.05 mg/dL    eGFR >90 >60 mL/min/1.73m*2    Calcium 9.5 8.6 - 10.6 mg/dL    Phosphorus 3.1 2.5 - 4.9 mg/dL    Albumin 3.7 3.4 - 5.0 g/dL          Assessment/Plan   Ariane Chavis is a 48 y.o. female presenting with a PMH of severe persistent asthma, HTN, cervical cancer (diagnosed 2015 status post radical hysterectomy with no need for chemo or radiation), melanoma (left knee, status post resection), precancerous lesions of her back status post resection in 2015) who initially presented at OSH from home with multiple episodes of right-sided numbness and weakness. MRI imaging revealed 4 cm homogeneous enhancing calcified extra-axial mass with left parietal convexity that is consistent with a meningioma with mild mass effect. Medicine was consulted for perioperative optimization and risk stratification. Patient with previous surgery in 2015 that was uncomplicated.     RCRI: 0 points, 3.9% risk 30-day risk of death, MI, or cardiac arrest   DASI: 34.7 points, METs > 4    Recommendations:   Patient chronic steroid use for severe persistent asthma. Recommend 40 mg Prednisone for 5 days perioperatively for prophylaxis given her severe asthma   Continue atenolol for HTN  Hold lisinopril and chlorthalidone prior to surgery   Repeat EKG, unable to access one from OSH  Can give PO hydral  25mg q6 PRN for SBP > 170s     Patient discussed with Dr. Nydia Jackson MD

## 2023-11-10 ENCOUNTER — APPOINTMENT (OUTPATIENT)
Dept: RADIOLOGY | Facility: HOSPITAL | Age: 48
DRG: 025 | End: 2023-11-10
Payer: COMMERCIAL

## 2023-11-10 ENCOUNTER — APPOINTMENT (OUTPATIENT)
Dept: NEUROLOGY | Facility: HOSPITAL | Age: 48
DRG: 025 | End: 2023-11-10
Payer: COMMERCIAL

## 2023-11-10 ENCOUNTER — ANESTHESIA (OUTPATIENT)
Dept: OPERATING ROOM | Facility: HOSPITAL | Age: 48
DRG: 025 | End: 2023-11-10
Payer: COMMERCIAL

## 2023-11-10 LAB
ABO GROUP (TYPE) IN BLOOD: NORMAL
RH FACTOR (ANTIGEN D): NORMAL

## 2023-11-10 PROCEDURE — 2500000001 HC RX 250 WO HCPCS SELF ADMINISTERED DRUGS (ALT 637 FOR MEDICARE OP): Performed by: STUDENT IN AN ORGANIZED HEALTH CARE EDUCATION/TRAINING PROGRAM

## 2023-11-10 PROCEDURE — 61512 CRNEC TREPH EXC MNGIOMA STTL: CPT | Performed by: NEUROLOGICAL SURGERY

## 2023-11-10 PROCEDURE — 2500000002 HC RX 250 W HCPCS SELF ADMINISTERED DRUGS (ALT 637 FOR MEDICARE OP, ALT 636 FOR OP/ED): Performed by: STUDENT IN AN ORGANIZED HEALTH CARE EDUCATION/TRAINING PROGRAM

## 2023-11-10 PROCEDURE — 88331 PATH CONSLTJ SURG 1 BLK 1SPC: CPT | Performed by: PATHOLOGY

## 2023-11-10 PROCEDURE — 2500000005 HC RX 250 GENERAL PHARMACY W/O HCPCS: Performed by: STUDENT IN AN ORGANIZED HEALTH CARE EDUCATION/TRAINING PROGRAM

## 2023-11-10 PROCEDURE — 2500000002 HC RX 250 W HCPCS SELF ADMINISTERED DRUGS (ALT 637 FOR MEDICARE OP, ALT 636 FOR OP/ED)

## 2023-11-10 PROCEDURE — 70450 CT HEAD/BRAIN W/O DYE: CPT

## 2023-11-10 PROCEDURE — 2500000004 HC RX 250 GENERAL PHARMACY W/ HCPCS (ALT 636 FOR OP/ED): Performed by: STUDENT IN AN ORGANIZED HEALTH CARE EDUCATION/TRAINING PROGRAM

## 2023-11-10 PROCEDURE — 3700000001 HC GENERAL ANESTHESIA TIME - INITIAL BASE CHARGE: Performed by: NEUROLOGICAL SURGERY

## 2023-11-10 PROCEDURE — 2020000001 HC ICU ROOM DAILY

## 2023-11-10 PROCEDURE — 61781 SCAN PROC CRANIAL INTRA: CPT | Performed by: NEUROLOGICAL SURGERY

## 2023-11-10 PROCEDURE — 2500000001 HC RX 250 WO HCPCS SELF ADMINISTERED DRUGS (ALT 637 FOR MEDICARE OP): Performed by: NEUROLOGICAL SURGERY

## 2023-11-10 PROCEDURE — 88307 TISSUE EXAM BY PATHOLOGIST: CPT | Performed by: PATHOLOGY

## 2023-11-10 PROCEDURE — 0NB40ZZ EXCISION OF LEFT PARIETAL BONE, OPEN APPROACH: ICD-10-PCS | Performed by: NEUROLOGICAL SURGERY

## 2023-11-10 PROCEDURE — 7100000009 HC PHASE TWO TIME - INITIAL BASE CHARGE: Performed by: NEUROLOGICAL SURGERY

## 2023-11-10 PROCEDURE — 2500000004 HC RX 250 GENERAL PHARMACY W/ HCPCS (ALT 636 FOR OP/ED): Performed by: NEUROLOGICAL SURGERY

## 2023-11-10 PROCEDURE — 36620 INSERTION CATHETER ARTERY: CPT | Performed by: STUDENT IN AN ORGANIZED HEALTH CARE EDUCATION/TRAINING PROGRAM

## 2023-11-10 PROCEDURE — A4217 STERILE WATER/SALINE, 500 ML: HCPCS | Performed by: NEUROLOGICAL SURGERY

## 2023-11-10 PROCEDURE — 7100000010 HC PHASE TWO TIME - EACH INCREMENTAL 1 MINUTE: Performed by: NEUROLOGICAL SURGERY

## 2023-11-10 PROCEDURE — 3600000005 HC OR TIME - INITIAL BASE CHARGE - PROCEDURE LEVEL FIVE: Performed by: NEUROLOGICAL SURGERY

## 2023-11-10 PROCEDURE — 0NR40JZ REPLACEMENT OF LEFT PARIETAL BONE WITH SYNTHETIC SUBSTITUTE, OPEN APPROACH: ICD-10-PCS | Performed by: NEUROLOGICAL SURGERY

## 2023-11-10 PROCEDURE — 3700000002 HC GENERAL ANESTHESIA TIME - EACH INCREMENTAL 1 MINUTE: Performed by: NEUROLOGICAL SURGERY

## 2023-11-10 PROCEDURE — A61510 PR EXCIS SUPRATENT BRAIN TUMOR: Performed by: STUDENT IN AN ORGANIZED HEALTH CARE EDUCATION/TRAINING PROGRAM

## 2023-11-10 PROCEDURE — 3600000010 HC OR TIME - EACH INCREMENTAL 1 MINUTE - PROCEDURE LEVEL FIVE: Performed by: NEUROLOGICAL SURGERY

## 2023-11-10 PROCEDURE — 94760 N-INVAS EAR/PLS OXIMETRY 1: CPT

## 2023-11-10 PROCEDURE — 2500000005 HC RX 250 GENERAL PHARMACY W/O HCPCS: Performed by: NEUROLOGICAL SURGERY

## 2023-11-10 PROCEDURE — 70450 CT HEAD/BRAIN W/O DYE: CPT | Performed by: RADIOLOGY

## 2023-11-10 PROCEDURE — 2720000007 HC OR 272 NO HCPCS: Performed by: NEUROLOGICAL SURGERY

## 2023-11-10 PROCEDURE — 2780000003 HC OR 278 NO HCPCS: Performed by: NEUROLOGICAL SURGERY

## 2023-11-10 PROCEDURE — 94640 AIRWAY INHALATION TREATMENT: CPT

## 2023-11-10 PROCEDURE — 2500000004 HC RX 250 GENERAL PHARMACY W/ HCPCS (ALT 636 FOR OP/ED)

## 2023-11-10 PROCEDURE — 88331 PATH CONSLTJ SURG 1 BLK 1SPC: CPT | Mod: TC,SUR | Performed by: NEUROLOGICAL SURGERY

## 2023-11-10 DEVICE — DURAGEN® PLUS DURAL REGENERATION MATRIX, 3 IN X 3 IN (7.5 CM X 7.5 CM)
Type: IMPLANTABLE DEVICE | Site: BRAIN | Status: FUNCTIONAL
Brand: DURAGEN® PLUS

## 2023-11-10 RX ORDER — ROCURONIUM BROMIDE 10 MG/ML
INJECTION, SOLUTION INTRAVENOUS AS NEEDED
Status: DISCONTINUED | OUTPATIENT
Start: 2023-11-10 | End: 2023-11-10

## 2023-11-10 RX ORDER — LIDOCAINE HYDROCHLORIDE AND EPINEPHRINE 5; 5 MG/ML; UG/ML
INJECTION, SOLUTION INFILTRATION; PERINEURAL AS NEEDED
Status: DISCONTINUED | OUTPATIENT
Start: 2023-11-10 | End: 2023-11-10 | Stop reason: HOSPADM

## 2023-11-10 RX ORDER — MANNITOL 20 G/100ML
INJECTION, SOLUTION INTRAVENOUS CONTINUOUS PRN
Status: DISCONTINUED | OUTPATIENT
Start: 2023-11-10 | End: 2023-11-10

## 2023-11-10 RX ORDER — IPRATROPIUM BROMIDE AND ALBUTEROL SULFATE 2.5; .5 MG/3ML; MG/3ML
3 SOLUTION RESPIRATORY (INHALATION) ONCE
Status: DISCONTINUED | OUTPATIENT
Start: 2023-11-10 | End: 2023-11-10 | Stop reason: HOSPADM

## 2023-11-10 RX ORDER — DEXAMETHASONE SODIUM PHOSPHATE 4 MG/ML
INJECTION, SOLUTION INTRA-ARTICULAR; INTRALESIONAL; INTRAMUSCULAR; INTRAVENOUS; SOFT TISSUE AS NEEDED
Status: DISCONTINUED | OUTPATIENT
Start: 2023-11-10 | End: 2023-11-10

## 2023-11-10 RX ORDER — FENTANYL CITRATE 50 UG/ML
INJECTION, SOLUTION INTRAMUSCULAR; INTRAVENOUS AS NEEDED
Status: DISCONTINUED | OUTPATIENT
Start: 2023-11-10 | End: 2023-11-10

## 2023-11-10 RX ORDER — DEXAMETHASONE SODIUM PHOSPHATE 4 MG/ML
4 INJECTION, SOLUTION INTRA-ARTICULAR; INTRALESIONAL; INTRAMUSCULAR; INTRAVENOUS; SOFT TISSUE EVERY 6 HOURS
Status: DISCONTINUED | OUTPATIENT
Start: 2023-11-10 | End: 2023-11-13

## 2023-11-10 RX ORDER — LIDOCAINE HYDROCHLORIDE 20 MG/ML
INJECTION, SOLUTION INFILTRATION; PERINEURAL AS NEEDED
Status: DISCONTINUED | OUTPATIENT
Start: 2023-11-10 | End: 2023-11-10

## 2023-11-10 RX ORDER — LABETALOL HYDROCHLORIDE 5 MG/ML
INJECTION, SOLUTION INTRAVENOUS AS NEEDED
Status: DISCONTINUED | OUTPATIENT
Start: 2023-11-10 | End: 2023-11-10

## 2023-11-10 RX ORDER — HYDRALAZINE HYDROCHLORIDE 20 MG/ML
10 INJECTION INTRAMUSCULAR; INTRAVENOUS EVERY 4 HOURS PRN
Status: DISCONTINUED | OUTPATIENT
Start: 2023-11-10 | End: 2023-11-17 | Stop reason: HOSPADM

## 2023-11-10 RX ORDER — BACITRACIN 500 [USP'U]/G
OINTMENT TOPICAL AS NEEDED
Status: DISCONTINUED | OUTPATIENT
Start: 2023-11-10 | End: 2023-11-10 | Stop reason: HOSPADM

## 2023-11-10 RX ORDER — CEFAZOLIN 1 G/1
INJECTION, POWDER, FOR SOLUTION INTRAVENOUS AS NEEDED
Status: DISCONTINUED | OUTPATIENT
Start: 2023-11-10 | End: 2023-11-10

## 2023-11-10 RX ORDER — MIDAZOLAM HYDROCHLORIDE 1 MG/ML
INJECTION INTRAMUSCULAR; INTRAVENOUS CONTINUOUS PRN
Status: DISCONTINUED | OUTPATIENT
Start: 2023-11-10 | End: 2023-11-10

## 2023-11-10 RX ORDER — ALBUTEROL SULFATE 0.83 MG/ML
SOLUTION RESPIRATORY (INHALATION)
Status: COMPLETED
Start: 2023-11-10 | End: 2023-11-10

## 2023-11-10 RX ORDER — PHENYLEPHRINE 10 MG/250 ML(40 MCG/ML)IN 0.9 % SOD.CHLORIDE INTRAVENOUS
CONTINUOUS PRN
Status: DISCONTINUED | OUTPATIENT
Start: 2023-11-10 | End: 2023-11-10

## 2023-11-10 RX ORDER — PROPOFOL 10 MG/ML
INJECTION, EMULSION INTRAVENOUS CONTINUOUS PRN
Status: DISCONTINUED | OUTPATIENT
Start: 2023-11-10 | End: 2023-11-10

## 2023-11-10 RX ORDER — SODIUM CHLORIDE, SODIUM LACTATE, POTASSIUM CHLORIDE, CALCIUM CHLORIDE 600; 310; 30; 20 MG/100ML; MG/100ML; MG/100ML; MG/100ML
INJECTION, SOLUTION INTRAVENOUS CONTINUOUS PRN
Status: DISCONTINUED | OUTPATIENT
Start: 2023-11-10 | End: 2023-11-10

## 2023-11-10 RX ORDER — PROPOFOL 10 MG/ML
INJECTION, EMULSION INTRAVENOUS AS NEEDED
Status: DISCONTINUED | OUTPATIENT
Start: 2023-11-10 | End: 2023-11-10

## 2023-11-10 RX ORDER — LEVETIRACETAM 5 MG/ML
500 INJECTION INTRAVASCULAR EVERY 12 HOURS
Status: DISCONTINUED | OUTPATIENT
Start: 2023-11-10 | End: 2023-11-17 | Stop reason: HOSPADM

## 2023-11-10 RX ORDER — SODIUM CHLORIDE 0.9 G/100ML
IRRIGANT IRRIGATION AS NEEDED
Status: DISCONTINUED | OUTPATIENT
Start: 2023-11-10 | End: 2023-11-10 | Stop reason: HOSPADM

## 2023-11-10 RX ORDER — MAGNESIUM SULFATE HEPTAHYDRATE 500 MG/ML
INJECTION, SOLUTION INTRAMUSCULAR; INTRAVENOUS AS NEEDED
Status: DISCONTINUED | OUTPATIENT
Start: 2023-11-10 | End: 2023-11-10

## 2023-11-10 RX ORDER — SODIUM CHLORIDE 9 MG/ML
100 INJECTION, SOLUTION INTRAVENOUS CONTINUOUS
Status: DISCONTINUED | OUTPATIENT
Start: 2023-11-10 | End: 2023-11-11

## 2023-11-10 RX ORDER — HYDROMORPHONE HYDROCHLORIDE 1 MG/ML
0.2 INJECTION, SOLUTION INTRAMUSCULAR; INTRAVENOUS; SUBCUTANEOUS
Status: DISCONTINUED | OUTPATIENT
Start: 2023-11-10 | End: 2023-11-12

## 2023-11-10 RX ORDER — LEVETIRACETAM 5 MG/ML
INJECTION INTRAVASCULAR AS NEEDED
Status: DISCONTINUED | OUTPATIENT
Start: 2023-11-10 | End: 2023-11-10

## 2023-11-10 RX ORDER — HYDROMORPHONE HYDROCHLORIDE 1 MG/ML
INJECTION, SOLUTION INTRAMUSCULAR; INTRAVENOUS; SUBCUTANEOUS AS NEEDED
Status: DISCONTINUED | OUTPATIENT
Start: 2023-11-10 | End: 2023-11-10

## 2023-11-10 RX ORDER — ESMOLOL HYDROCHLORIDE 10 MG/ML
INJECTION INTRAVENOUS AS NEEDED
Status: DISCONTINUED | OUTPATIENT
Start: 2023-11-10 | End: 2023-11-10

## 2023-11-10 RX ADMIN — SODIUM CHLORIDE 100 ML/HR: 9 INJECTION, SOLUTION INTRAVENOUS at 15:00

## 2023-11-10 RX ADMIN — SODIUM CHLORIDE, POTASSIUM CHLORIDE, SODIUM LACTATE AND CALCIUM CHLORIDE: 600; 310; 30; 20 INJECTION, SOLUTION INTRAVENOUS at 07:56

## 2023-11-10 RX ADMIN — LEVETIRACETAM 1000 MG: 5 INJECTION INTRAVENOUS at 09:14

## 2023-11-10 RX ADMIN — LIDOCAINE HYDROCHLORIDE 100 MG: 20 INJECTION, SOLUTION INFILTRATION; PERINEURAL at 08:05

## 2023-11-10 RX ADMIN — OXYCODONE HYDROCHLORIDE 2.5 MG: 5 TABLET ORAL at 22:05

## 2023-11-10 RX ADMIN — ACETAMINOPHEN 650 MG: 325 TABLET ORAL at 22:05

## 2023-11-10 RX ADMIN — HYDROMORPHONE HYDROCHLORIDE 0.2 MG: 1 INJECTION, SOLUTION INTRAMUSCULAR; INTRAVENOUS; SUBCUTANEOUS at 20:39

## 2023-11-10 RX ADMIN — Medication 0.1 MCG/KG/MIN: at 09:09

## 2023-11-10 RX ADMIN — ALBUTEROL SULFATE 1.25 MG: 2.5 SOLUTION RESPIRATORY (INHALATION) at 14:47

## 2023-11-10 RX ADMIN — PROPOFOL 20 MG: 10 INJECTION, EMULSION INTRAVENOUS at 12:44

## 2023-11-10 RX ADMIN — ESMOLOL HYDROCHLORIDE 30 MG: 10 INJECTION, SOLUTION INTRAVENOUS at 09:53

## 2023-11-10 RX ADMIN — ROCURONIUM BROMIDE 20 MG: 50 INJECTION, SOLUTION INTRAVENOUS at 09:18

## 2023-11-10 RX ADMIN — PROPOFOL 30 MG: 10 INJECTION, EMULSION INTRAVENOUS at 12:41

## 2023-11-10 RX ADMIN — MAGNESIUM SULFATE HEPTAHYDRATE 0.5 G: 500 INJECTION, SOLUTION INTRAMUSCULAR; INTRAVENOUS at 12:34

## 2023-11-10 RX ADMIN — FENTANYL CITRATE 100 MCG: 50 INJECTION, SOLUTION INTRAMUSCULAR; INTRAVENOUS at 08:05

## 2023-11-10 RX ADMIN — LABETALOL HYDROCHLORIDE 10 MG: 5 INJECTION, SOLUTION INTRAVENOUS at 12:45

## 2023-11-10 RX ADMIN — ESMOLOL HYDROCHLORIDE 20 MG: 10 INJECTION, SOLUTION INTRAVENOUS at 11:49

## 2023-11-10 RX ADMIN — DEXAMETHASONE SODIUM PHOSPHATE 10 MG: 4 INJECTION, SOLUTION INTRA-ARTICULAR; INTRALESIONAL; INTRAMUSCULAR; INTRAVENOUS; SOFT TISSUE at 08:06

## 2023-11-10 RX ADMIN — MAGNESIUM SULFATE HEPTAHYDRATE 0.5 G: 500 INJECTION, SOLUTION INTRAMUSCULAR; INTRAVENOUS at 11:44

## 2023-11-10 RX ADMIN — SUGAMMADEX 200 MG: 100 INJECTION, SOLUTION INTRAVENOUS at 13:33

## 2023-11-10 RX ADMIN — PROPOFOL 50 MG: 10 INJECTION, EMULSION INTRAVENOUS at 09:18

## 2023-11-10 RX ADMIN — LABETALOL HYDROCHLORIDE 15 MG: 5 INJECTION, SOLUTION INTRAVENOUS at 13:12

## 2023-11-10 RX ADMIN — LIDOCAINE HYDROCHLORIDE 50 MG: 20 INJECTION, SOLUTION INFILTRATION; PERINEURAL at 13:33

## 2023-11-10 RX ADMIN — PROPOFOL 50 MCG/KG/MIN: 10 INJECTION, EMULSION INTRAVENOUS at 09:16

## 2023-11-10 RX ADMIN — MAGNESIUM SULFATE HEPTAHYDRATE 0.5 G: 500 INJECTION, SOLUTION INTRAMUSCULAR; INTRAVENOUS at 11:31

## 2023-11-10 RX ADMIN — DEXMEDETOMIDINE HYDROCHLORIDE 4 MCG: 100 INJECTION, SOLUTION INTRAVENOUS at 13:11

## 2023-11-10 RX ADMIN — HYDROMORPHONE HYDROCHLORIDE 0.4 MG: 1 INJECTION, SOLUTION INTRAMUSCULAR; INTRAVENOUS; SUBCUTANEOUS at 13:17

## 2023-11-10 RX ADMIN — ATENOLOL 50 MG: 50 TABLET ORAL at 06:01

## 2023-11-10 RX ADMIN — LABETALOL HYDROCHLORIDE 5 MG: 5 INJECTION, SOLUTION INTRAVENOUS at 10:28

## 2023-11-10 RX ADMIN — CEFAZOLIN 2 G: 1 INJECTION, POWDER, FOR SOLUTION INTRAMUSCULAR; INTRAVENOUS at 09:30

## 2023-11-10 RX ADMIN — PROPOFOL 200 MG: 10 INJECTION, EMULSION INTRAVENOUS at 08:05

## 2023-11-10 RX ADMIN — PROPOFOL 40 MG: 10 INJECTION, EMULSION INTRAVENOUS at 09:27

## 2023-11-10 RX ADMIN — ESMOLOL HYDROCHLORIDE 50 MG: 10 INJECTION, SOLUTION INTRAVENOUS at 09:27

## 2023-11-10 RX ADMIN — PROPOFOL 30 MCG/KG/MIN: 10 INJECTION, EMULSION INTRAVENOUS at 09:07

## 2023-11-10 RX ADMIN — MANNITOL: 20 INJECTION, SOLUTION INTRAVENOUS at 09:14

## 2023-11-10 RX ADMIN — DEXMEDETOMIDINE HYDROCHLORIDE 4 MCG: 100 INJECTION, SOLUTION INTRAVENOUS at 13:05

## 2023-11-10 RX ADMIN — FLUTICASONE FUROATE AND VILANTEROL TRIFENATATE 1 PUFF: 100; 25 POWDER RESPIRATORY (INHALATION) at 15:27

## 2023-11-10 RX ADMIN — PROPOFOL 30 MG: 10 INJECTION, EMULSION INTRAVENOUS at 11:54

## 2023-11-10 RX ADMIN — ROCURONIUM BROMIDE 80 MG: 50 INJECTION, SOLUTION INTRAVENOUS at 08:05

## 2023-11-10 RX ADMIN — ALBUTEROL SULFATE 1.25 MG: 2.5 SOLUTION RESPIRATORY (INHALATION) at 07:03

## 2023-11-10 RX ADMIN — LEVETIRACETAM 500 MG: 5 INJECTION INTRAVENOUS at 16:50

## 2023-11-10 RX ADMIN — ALBUTEROL SULFATE 1.25 MG: 2.5 SOLUTION RESPIRATORY (INHALATION) at 20:48

## 2023-11-10 RX ADMIN — ONDANSETRON 4 MG: 2 INJECTION INTRAMUSCULAR; INTRAVENOUS at 12:39

## 2023-11-10 RX ADMIN — DEXAMETHASONE SODIUM PHOSPHATE 4 MG: 4 INJECTION, SOLUTION INTRAMUSCULAR; INTRAVENOUS at 20:38

## 2023-11-10 RX ADMIN — DEXMEDETOMIDINE HYDROCHLORIDE 4 MCG: 100 INJECTION, SOLUTION INTRAVENOUS at 12:45

## 2023-11-10 RX ADMIN — MAGNESIUM SULFATE HEPTAHYDRATE 0.5 G: 500 INJECTION, SOLUTION INTRAMUSCULAR; INTRAVENOUS at 11:49

## 2023-11-10 RX ADMIN — SODIUM CHLORIDE, SODIUM LACTATE, POTASSIUM CHLORIDE, AND CALCIUM CHLORIDE: 600; 310; 30; 20 INJECTION, SOLUTION INTRAVENOUS at 08:36

## 2023-11-10 RX ADMIN — SENNOSIDES AND DOCUSATE SODIUM 2 TABLET: 8.6; 5 TABLET ORAL at 21:48

## 2023-11-10 RX ADMIN — BUDESONIDE 0.25 MG: 0.25 INHALANT RESPIRATORY (INHALATION) at 15:27

## 2023-11-10 RX ADMIN — CEFAZOLIN 2 G: 1 INJECTION, POWDER, FOR SOLUTION INTRAMUSCULAR; INTRAVENOUS at 08:05

## 2023-11-10 RX ADMIN — MONTELUKAST SODIUM 10 MG: 10 TABLET, FILM COATED ORAL at 21:49

## 2023-11-10 RX ADMIN — DEXAMETHASONE SODIUM PHOSPHATE 4 MG: 4 INJECTION, SOLUTION INTRAMUSCULAR; INTRAVENOUS at 14:48

## 2023-11-10 SDOH — HEALTH STABILITY: MENTAL HEALTH: CURRENT SMOKER: 0

## 2023-11-10 ASSESSMENT — PAIN SCALES - GENERAL
PAINLEVEL_OUTOF10: 4
PAINLEVEL_OUTOF10: 8
PAINLEVEL_OUTOF10: 0 - NO PAIN
PAINLEVEL_OUTOF10: 1
PAINLEVEL_OUTOF10: 8
PAINLEVEL_OUTOF10: 0 - NO PAIN
PAINLEVEL_OUTOF10: 4
PAIN_LEVEL: 0

## 2023-11-10 ASSESSMENT — PAIN DESCRIPTION - LOCATION
LOCATION: HEAD
LOCATION: HEAD

## 2023-11-10 ASSESSMENT — PAIN DESCRIPTION - DESCRIPTORS
DESCRIPTORS: THROBBING;STABBING
DESCRIPTORS: HEADACHE

## 2023-11-10 ASSESSMENT — PAIN - FUNCTIONAL ASSESSMENT
PAIN_FUNCTIONAL_ASSESSMENT: 0-10

## 2023-11-10 NOTE — ANESTHESIA PROCEDURE NOTES
Airway  Date/Time: 11/10/2023 8:12 AM  Urgency: elective    Airway not difficult    Staffing  Performed: resident   Authorized by: Anjana Wan MD    Performed by: Jordyn Ayala MD  Patient location during procedure: OR    Indications and Patient Condition  Indications for airway management: anesthesia  Spontaneous ventilation: present  Sedation level: deep  Preoxygenated: yes  Patient position: sniffing  MILS maintained throughout  Mask difficulty assessment: 1 - vent by mask  Planned trial extubation    Final Airway Details  Final airway type: endotracheal airway      Successful airway: ETT  Cuffed: yes   Successful intubation technique: direct laryngoscopy  Facilitating devices/methods: intubating stylet  Blade: Jaswinder  Blade size: #3  ETT size (mm): 7.0  Cormack-Lehane Classification: grade I - full view of glottis  Placement verified by: chest auscultation and capnometry   Measured from: lips  ETT to lips (cm): 21  Number of attempts at approach: 1

## 2023-11-10 NOTE — ANESTHESIA PROCEDURE NOTES
Peripheral IV  Date/Time: 11/10/2023 8:36 AM      Placement  Needle size: 18 G  Laterality: right  Location: hand  Site prep: chlorhexidine

## 2023-11-10 NOTE — PROGRESS NOTES
"Ariane Chavis is a 48 y.o. female on day 1 of admission presenting with Meningioma (CMS/HCC).    Subjective   No acute events overnight. Not sleeping well.       Objective     Physical Exam  Neuro:   Awake, Ox3   Face symmetric  Fcx4 with symmetric strength   Strength 5/5 in all extremities     Last Recorded Vitals  Blood pressure 149/82, pulse 74, temperature 35.9 °C (96.6 °F), temperature source Temporal, resp. rate 20, height 1.651 m (5' 5\"), weight 114 kg (250 lb 10.6 oz), SpO2 95 %.  Intake/Output last 3 Shifts:  I/O last 3 completed shifts:  In: 1831 (16.1 mL/kg) [I.V.:1831 (16.1 mL/kg)]  Out: - (0 mL/kg)   Weight: 113.7 kg     Relevant Results                             Assessment/Plan   Principal Problem:    Meningioma (CMS/HCC)  Active Problems:    HTN (hypertension)    Obesity    Severe asthma    Ariane Chavis is a 48 y.o. female w h/o HTN, melanoma (knee, removed), cervical cancer (in remission), presenting with 2 episodes of transient L side weakness and numbness. Pt states she was suddenly unable to move her leg, and her arm felt weak, and this resolved shortly afterwards. Pt denies any headaches, nausea/vomiting, or any other symptoms. Patient currently feels back to baseline. CTH - left parietal extra-axial calcified mass  MRI brain - left parietal dural-based lesion with surrounding vasogenic edema, c/w meningioma, without brain compression or midline shift     11/9 MRV- meningioma abuts the posterior superior transverse sinus with mild narrowing, no evidence of dural venous thrombosis   CTAP - 5 cm R adnexal mass    PLAN  Floor  OR 11/10 for crani with tumor resection  Q4 hour neuro checks  Medicine- RCRI 0, appreciate recs  Pelvic US after OR for adnexal mass  Keppra 500BID, EEG  Dex4q6  SCDs/SQH           Gilma Calabrese MD      "

## 2023-11-10 NOTE — CARE PLAN
The patient's goals for the shift include      The clinical goals for the shift include Patient remains safe, free from injury    Over the shift, the patient did make progress toward the aforementioned goals.

## 2023-11-10 NOTE — ANESTHESIA POSTPROCEDURE EVALUATION
Patient: Ariane Chavis    Procedure Summary       Date: 11/10/23 Room / Location: [facility] OR 24 / [facility] OR    Anesthesia Start: 0752 Anesthesia Stop: 1411    Procedure: Craniectomy with Excision Tissue and Navigation, mesh cranioplasty (Left: Head) Diagnosis:       Meningioma (CMS/HCC)      (Meningioma (CMS/HCC) [D32.9])    Surgeons: Bayron Mahan MD Responsible Provider: Anjana Wan MD    Anesthesia Type: general ASA Status: 3            Anesthesia Type: general    Vitals Value Taken Time   /75 11/10/23 1402   Temp 36.2 11/10/23 1412   Pulse 80 11/10/23 1411   Resp 22 11/10/23 1411   SpO2 99 % 11/10/23 1411   Vitals shown include unvalidated device data.    Anesthesia Post Evaluation    Patient location during evaluation: ICU  Patient participation: complete - patient participated  Level of consciousness: lethargic, responsive to verbal stimuli and responsive to painful stimuli  Pain score: 0  Pain management: adequate  Multimodal analgesia pain management approach  Airway patency: patent  Two or more strategies used to mitigate risk of obstructive sleep apnea  Cardiovascular status: acceptable and hemodynamically stable  Respiratory status: acceptable and face mask  Hydration status: acceptable        There were no known notable events for this encounter.

## 2023-11-10 NOTE — ANESTHESIA PROCEDURE NOTES
Peripheral IV  Date/Time: 11/10/2023 8:35 AM      Placement  Needle size: 16 G  Laterality: left  Location: hand  Local anesthetic: none  Site prep: chlorhexidine  Attempts: 1

## 2023-11-10 NOTE — BRIEF OP NOTE
Date: 11/10/2023  OR Location: [facility] OR    Name: Ariane Chavis, : 1975, Age: 48 y.o., MRN: [id number], Sex: female    Diagnosis  Pre-op Diagnosis     * Meningioma (CMS/HCC) [D32.9] Post-op Diagnosis     * Meningioma (CMS/HCC) [D32.9]     Procedures  Craniectomy with Excision Tissue and Navigation, mesh cranioplasty  [id number] - MA CRANIEC TREPHINE BONE FLP BRAIN TUMOR SUPRTENTOR      Surgeons      * Bayron Mahan - Primary    Resident/Fellow/Other Assistant:  Surgeon(s) and Role:     * Angel Jacob MD - Resident - Assisting    Procedure Summary  Anesthesia: General  ASA: III  Anesthesia Staff: Anesthesiologist: Anjana Wan MD  Anesthesia Resident: Jordyn Ayala MD  Estimated Blood Loss: 306mL  Intra-op Medications:   Medication Name Total Dose   chlorthalidone (Hygroton) tablet 25 mg Cannot be calculated   lisinopril tablet 5 mg Cannot be calculated   sodium chloride 0.9% infusion Cannot be calculated              Anesthesia Record               Intraprocedure I/O Totals          Intake    Dexmedetomidine 0.00 mL    The total shown is the total volume documented since Anesthesia Start was filed.    mannitol 20 % 285.00 mL    Propofol Drip 0.00 mL    The total shown is the total volume documented since Anesthesia Start was filed.    Phenylephrine Drip 0.00 mL    The total shown is the total volume documented since Anesthesia Start was filed.    Total Intake 285 mL       Output    Urine 870 mL    Est. Blood Loss 150 mL    Total Output 1020 mL       Net    Net Volume -735 mL          Specimen:   ID Type Source Tests Collected by Time   1 : BRAIN TUMOR Tissue BRAIN RESECTION SURGICAL PATHOLOGY EXAM Bayron Mahan MD 11/10/2023 1111   2 : 2. BRAIN TUMOR Tissue BRAIN RESECTION SURGICAL PATHOLOGY EXAM Bayron Mahan MD 11/10/2023 1156        Staff:   Circulator: Em Garber RN; Eloisa Gary RN  Scrub Person: Jeremy Roman; Roxie Odell RN          Findings: prelim path  meningioma    Complications:  None; patient tolerated the procedure well.     Disposition: PACU - hemodynamically stable.  Condition: stable  Specimens Collected:   ID Type Source Tests Collected by Time   1 : BRAIN TUMOR Tissue BRAIN RESECTION SURGICAL PATHOLOGY EXAM Bayron Mahan MD 11/10/2023 1111   2 : 2. BRAIN TUMOR Tissue BRAIN RESECTION SURGICAL PATHOLOGY EXAM Bayron Mahan MD 11/10/2023 1156     Attending Attestation:     Bayron Mahan  Phone Number: [phone]

## 2023-11-10 NOTE — ANESTHESIA PREPROCEDURE EVALUATION
Patient: Ariane Chavis    Procedure Information       Date/Time: 11/10/23 0715    Procedure: Craniotomy with Excision Tissue and Navigation (Left: Head)    Location: [facility] OR 24 / [facility] OR    Surgeons: Bayron Mahan MD            Relevant Problems   Anesthesia (within normal limits)      Cardiovascular   (+) HTN (hypertension)      Endocrine   (+) Obesity      GI (within normal limits)      /Renal  History of cervical cancer      Neuro/Psych  meningioma      Pulmonary   (+) Severe asthma      GI/Hepatic (within normal limits)      Hematology (within normal limits)      Musculoskeletal (within normal limits)       Clinical information reviewed:   Tobacco  Allergies  Meds   Med Hx  Surg Hx   Fam Hx  Soc Hx        NPO Detail:  NPO/Void Status  Date of Last Liquid: 11/10/23  Time of Last Liquid: 0001  Date of Last Solid: 11/09/23  Time of Last Solid: 2100  Last Intake Type: Clear fluids         Physical Exam    Airway  Mallampati: II  TM distance: >3 FB  Neck ROM: full     Cardiovascular   Rhythm: regular  Rate: normal  (-) murmur     Dental    Pulmonary   (-) wheezes     Abdominal            Anesthesia Plan    ASA 3     general     The patient is not a current smoker.  Patient did not smoke on day of procedure.  Education provided regarding risk of obstructive sleep apnea.  intravenous induction   Postoperative administration of opioids is intended.  Trial extubation is planned.  Anesthetic plan and risks discussed with patient.  Use of blood products discussed with patient who consented to blood products.    Plan discussed with resident and attending.

## 2023-11-10 NOTE — SIGNIFICANT EVENT
"Preliminary EEG Report    This vEEG is indicative of a left parietal skull defect as well as a mild diffuse encephalopathy.     This EEG was read up until 18:11 on 11/10/23.     The final impression will be available tomorrow under Chart Review in the Media tab.   To discontinue video EEG, place \"Discontinue Continuous VEEG\" order.     Linda Montgomery MD  Epilepsy Fellow [id number]    "

## 2023-11-10 NOTE — ANESTHESIA PROCEDURE NOTES
Arterial Line:    Date/Time: 11/10/2023 8:35 AM    Staffing  Performed: resident   Authorized by: Anjana Wan MD    Performed by: Jordyn Ayala MD    An arterial line was placed. in the OR for the following indication(s): continuous blood pressure monitoring and blood sampling needed.    A 20 gauge (size), 1 and 3/4 inch (length), Angiocath (type) catheter was placed into the Right radial artery, secured by TegadermEvents:  patient tolerated procedure well with no complications.

## 2023-11-11 ENCOUNTER — APPOINTMENT (OUTPATIENT)
Dept: NEUROLOGY | Facility: HOSPITAL | Age: 48
DRG: 025 | End: 2023-11-11
Payer: COMMERCIAL

## 2023-11-11 ENCOUNTER — APPOINTMENT (OUTPATIENT)
Dept: RADIOLOGY | Facility: HOSPITAL | Age: 48
DRG: 025 | End: 2023-11-11
Payer: COMMERCIAL

## 2023-11-11 LAB
ALBUMIN SERPL BCP-MCNC: 3.3 G/DL (ref 3.4–5)
ANION GAP SERPL CALC-SCNC: 13 MMOL/L (ref 10–20)
BUN SERPL-MCNC: 12 MG/DL (ref 6–23)
CALCIUM SERPL-MCNC: 8 MG/DL (ref 8.6–10.6)
CHLORIDE SERPL-SCNC: 101 MMOL/L (ref 98–107)
CO2 SERPL-SCNC: 27 MMOL/L (ref 21–32)
CREAT SERPL-MCNC: 0.51 MG/DL (ref 0.5–1.05)
ERYTHROCYTE [DISTWIDTH] IN BLOOD BY AUTOMATED COUNT: 14 % (ref 11.5–14.5)
GFR SERPL CREATININE-BSD FRML MDRD: >90 ML/MIN/1.73M*2
GLUCOSE SERPL-MCNC: 201 MG/DL (ref 74–99)
HCT VFR BLD AUTO: 32.1 % (ref 36–46)
HGB BLD-MCNC: 10.8 G/DL (ref 12–16)
MCH RBC QN AUTO: 31.6 PG (ref 26–34)
MCHC RBC AUTO-ENTMCNC: 33.6 G/DL (ref 32–36)
MCV RBC AUTO: 94 FL (ref 80–100)
NRBC BLD-RTO: 0 /100 WBCS (ref 0–0)
PHOSPHATE SERPL-MCNC: 3.5 MG/DL (ref 2.5–4.9)
PLATELET # BLD AUTO: 293 X10*3/UL (ref 150–450)
POTASSIUM SERPL-SCNC: 4.2 MMOL/L (ref 3.5–5.3)
RBC # BLD AUTO: 3.42 X10*6/UL (ref 4–5.2)
SODIUM SERPL-SCNC: 137 MMOL/L (ref 136–145)
WBC # BLD AUTO: 11.1 X10*3/UL (ref 4.4–11.3)

## 2023-11-11 PROCEDURE — 80069 RENAL FUNCTION PANEL: CPT | Performed by: STUDENT IN AN ORGANIZED HEALTH CARE EDUCATION/TRAINING PROGRAM

## 2023-11-11 PROCEDURE — 70544 MR ANGIOGRAPHY HEAD W/O DYE: CPT | Performed by: RADIOLOGY

## 2023-11-11 PROCEDURE — 2500000001 HC RX 250 WO HCPCS SELF ADMINISTERED DRUGS (ALT 637 FOR MEDICARE OP): Performed by: STUDENT IN AN ORGANIZED HEALTH CARE EDUCATION/TRAINING PROGRAM

## 2023-11-11 PROCEDURE — 2500000002 HC RX 250 W HCPCS SELF ADMINISTERED DRUGS (ALT 637 FOR MEDICARE OP, ALT 636 FOR OP/ED)

## 2023-11-11 PROCEDURE — 2550000001 HC RX 255 CONTRASTS: Performed by: NEUROLOGICAL SURGERY

## 2023-11-11 PROCEDURE — 2500000002 HC RX 250 W HCPCS SELF ADMINISTERED DRUGS (ALT 637 FOR MEDICARE OP, ALT 636 FOR OP/ED): Performed by: STUDENT IN AN ORGANIZED HEALTH CARE EDUCATION/TRAINING PROGRAM

## 2023-11-11 PROCEDURE — A9575 INJ GADOTERATE MEGLUMI 0.1ML: HCPCS | Performed by: NEUROLOGICAL SURGERY

## 2023-11-11 PROCEDURE — 85027 COMPLETE CBC AUTOMATED: CPT | Performed by: STUDENT IN AN ORGANIZED HEALTH CARE EDUCATION/TRAINING PROGRAM

## 2023-11-11 PROCEDURE — 37799 UNLISTED PX VASCULAR SURGERY: CPT | Performed by: STUDENT IN AN ORGANIZED HEALTH CARE EDUCATION/TRAINING PROGRAM

## 2023-11-11 PROCEDURE — 70547 MR ANGIOGRAPHY NECK W/O DYE: CPT | Performed by: RADIOLOGY

## 2023-11-11 PROCEDURE — 2500000004 HC RX 250 GENERAL PHARMACY W/ HCPCS (ALT 636 FOR OP/ED)

## 2023-11-11 PROCEDURE — 2500000004 HC RX 250 GENERAL PHARMACY W/ HCPCS (ALT 636 FOR OP/ED): Performed by: STUDENT IN AN ORGANIZED HEALTH CARE EDUCATION/TRAINING PROGRAM

## 2023-11-11 PROCEDURE — 95720 EEG PHY/QHP EA INCR W/VEEG: CPT | Performed by: STUDENT IN AN ORGANIZED HEALTH CARE EDUCATION/TRAINING PROGRAM

## 2023-11-11 PROCEDURE — 70553 MRI BRAIN STEM W/O & W/DYE: CPT

## 2023-11-11 PROCEDURE — 94640 AIRWAY INHALATION TREATMENT: CPT

## 2023-11-11 PROCEDURE — 70547 MR ANGIOGRAPHY NECK W/O DYE: CPT

## 2023-11-11 PROCEDURE — 51702 INSERT TEMP BLADDER CATH: CPT

## 2023-11-11 PROCEDURE — 95716 VEEG EA 12-26HR CONT MNTR: CPT

## 2023-11-11 PROCEDURE — 95700 EEG CONT REC W/VID EEG TECH: CPT

## 2023-11-11 PROCEDURE — 70544 MR ANGIOGRAPHY HEAD W/O DYE: CPT

## 2023-11-11 PROCEDURE — 70553 MRI BRAIN STEM W/O & W/DYE: CPT | Performed by: RADIOLOGY

## 2023-11-11 PROCEDURE — 2060000001 HC INTERMEDIATE ICU ROOM DAILY

## 2023-11-11 RX ORDER — HYDROMORPHONE HYDROCHLORIDE 1 MG/ML
0.4 INJECTION, SOLUTION INTRAMUSCULAR; INTRAVENOUS; SUBCUTANEOUS ONCE
Status: DISCONTINUED | OUTPATIENT
Start: 2023-11-11 | End: 2023-11-17 | Stop reason: HOSPADM

## 2023-11-11 RX ORDER — GADOTERATE MEGLUMINE 376.9 MG/ML
20 INJECTION INTRAVENOUS
Status: COMPLETED | OUTPATIENT
Start: 2023-11-11 | End: 2023-11-11

## 2023-11-11 RX ORDER — ALBUTEROL SULFATE 0.83 MG/ML
SOLUTION RESPIRATORY (INHALATION)
Status: COMPLETED
Start: 2023-11-11 | End: 2023-11-11

## 2023-11-11 RX ADMIN — FLUTICASONE FUROATE AND VILANTEROL TRIFENATATE 1 PUFF: 100; 25 POWDER RESPIRATORY (INHALATION) at 07:50

## 2023-11-11 RX ADMIN — ATENOLOL 50 MG: 50 TABLET ORAL at 09:00

## 2023-11-11 RX ADMIN — DEXAMETHASONE SODIUM PHOSPHATE 4 MG: 4 INJECTION, SOLUTION INTRAMUSCULAR; INTRAVENOUS at 21:15

## 2023-11-11 RX ADMIN — Medication 3 MG: at 21:15

## 2023-11-11 RX ADMIN — HYDROMORPHONE HYDROCHLORIDE 0.2 MG: 1 INJECTION, SOLUTION INTRAMUSCULAR; INTRAVENOUS; SUBCUTANEOUS at 20:21

## 2023-11-11 RX ADMIN — ALBUTEROL SULFATE 2.5 MG: 2.5 SOLUTION RESPIRATORY (INHALATION) at 07:52

## 2023-11-11 RX ADMIN — OXYCODONE HYDROCHLORIDE 2.5 MG: 5 TABLET ORAL at 04:34

## 2023-11-11 RX ADMIN — BUDESONIDE 0.25 MG: 0.25 INHALANT RESPIRATORY (INHALATION) at 07:47

## 2023-11-11 RX ADMIN — OXYCODONE HYDROCHLORIDE 2.5 MG: 5 TABLET ORAL at 08:57

## 2023-11-11 RX ADMIN — DEXAMETHASONE SODIUM PHOSPHATE 4 MG: 4 INJECTION, SOLUTION INTRAMUSCULAR; INTRAVENOUS at 13:28

## 2023-11-11 RX ADMIN — ALBUTEROL SULFATE 2.5 MG: 2.5 SOLUTION RESPIRATORY (INHALATION) at 22:09

## 2023-11-11 RX ADMIN — MONTELUKAST SODIUM 10 MG: 10 TABLET, FILM COATED ORAL at 21:16

## 2023-11-11 RX ADMIN — HYDROMORPHONE HYDROCHLORIDE 0.2 MG: 1 INJECTION, SOLUTION INTRAMUSCULAR; INTRAVENOUS; SUBCUTANEOUS at 09:56

## 2023-11-11 RX ADMIN — DEXAMETHASONE SODIUM PHOSPHATE 4 MG: 4 INJECTION, SOLUTION INTRAMUSCULAR; INTRAVENOUS at 02:12

## 2023-11-11 RX ADMIN — LEVETIRACETAM 500 MG: 5 INJECTION INTRAVENOUS at 04:34

## 2023-11-11 RX ADMIN — LEVETIRACETAM 500 MG: 5 INJECTION INTRAVENOUS at 16:40

## 2023-11-11 RX ADMIN — HYDROMORPHONE HYDROCHLORIDE 0.2 MG: 1 INJECTION, SOLUTION INTRAMUSCULAR; INTRAVENOUS; SUBCUTANEOUS at 13:41

## 2023-11-11 RX ADMIN — DEXAMETHASONE SODIUM PHOSPHATE 4 MG: 4 INJECTION, SOLUTION INTRAMUSCULAR; INTRAVENOUS at 08:50

## 2023-11-11 RX ADMIN — ACETAMINOPHEN 650 MG: 325 TABLET ORAL at 16:40

## 2023-11-11 RX ADMIN — HYDROMORPHONE HYDROCHLORIDE 0.2 MG: 1 INJECTION, SOLUTION INTRAMUSCULAR; INTRAVENOUS; SUBCUTANEOUS at 17:07

## 2023-11-11 RX ADMIN — TIOTROPIUM BROMIDE INHALATION SPRAY 2 PUFF: 3.12 SPRAY, METERED RESPIRATORY (INHALATION) at 07:51

## 2023-11-11 RX ADMIN — ACETAMINOPHEN 650 MG: 325 TABLET ORAL at 04:34

## 2023-11-11 RX ADMIN — ALBUTEROL SULFATE 1.25 MG: 2.5 SOLUTION RESPIRATORY (INHALATION) at 16:23

## 2023-11-11 RX ADMIN — SENNOSIDES AND DOCUSATE SODIUM 2 TABLET: 8.6; 5 TABLET ORAL at 08:50

## 2023-11-11 RX ADMIN — ACETAMINOPHEN 650 MG: 325 TABLET ORAL at 09:57

## 2023-11-11 RX ADMIN — POLYETHYLENE GLYCOL 3350 17 G: 17 POWDER, FOR SOLUTION ORAL at 21:15

## 2023-11-11 RX ADMIN — GADOTERATE MEGLUMINE 20 ML: 376.9 INJECTION INTRAVENOUS at 11:11

## 2023-11-11 RX ADMIN — SENNOSIDES AND DOCUSATE SODIUM 2 TABLET: 8.6; 5 TABLET ORAL at 21:16

## 2023-11-11 ASSESSMENT — PAIN SCALES - GENERAL
PAINLEVEL_OUTOF10: 3
PAINLEVEL_OUTOF10: 5 - MODERATE PAIN
PAINLEVEL_OUTOF10: 7
PAINLEVEL_OUTOF10: 0 - NO PAIN
PAINLEVEL_OUTOF10: 8
PAINLEVEL_OUTOF10: 8
PAINLEVEL_OUTOF10: 0 - NO PAIN
PAINLEVEL_OUTOF10: 4
PAINLEVEL_OUTOF10: 0 - NO PAIN
PAINLEVEL_OUTOF10: 0 - NO PAIN
PAINLEVEL_OUTOF10: 6
PAINLEVEL_OUTOF10: 0 - NO PAIN
PAINLEVEL_OUTOF10: 7
PAINLEVEL_OUTOF10: 0 - NO PAIN
PAINLEVEL_OUTOF10: 3
PAINLEVEL_OUTOF10: 5 - MODERATE PAIN

## 2023-11-11 ASSESSMENT — PAIN - FUNCTIONAL ASSESSMENT
PAIN_FUNCTIONAL_ASSESSMENT: 0-10

## 2023-11-11 ASSESSMENT — PAIN DESCRIPTION - DESCRIPTORS
DESCRIPTORS: HEADACHE
DESCRIPTORS: ACHING

## 2023-11-11 NOTE — PROGRESS NOTES
Physical Therapy                 Therapy Communication Note    Patient Name: Ariane Chavis  MRN: [id number]  Today's Date: 11/11/2023     Discipline: Physical Therapy    Missed Visit Reason: Missed Visit Reason:  (Chart review performed, pt off floor, will reattempt PT eval as time allows.)    Missed Time: Attempt    Comment:

## 2023-11-11 NOTE — PROGRESS NOTES
"Ariane Chavis is a 48 y.o. female on day 2 of admission presenting with Meningioma (CMS/HCC).    Subjective   No acute events overnight. Not sleeping well.       Objective     Physical Exam  Neuro:   Awake, Ox3   Face symmetric  L 5/5  R flaccid     Last Recorded Vitals  Blood pressure (!) 140/93, pulse 104, temperature 35.7 °C (96.2 °F), temperature source Temporal, resp. rate 17, height 1.651 m (5' 5\"), weight 118 kg (260 lb 2.3 oz), SpO2 100 %.  Intake/Output last 3 Shifts:  I/O last 3 completed shifts:  In: 3885 (32.9 mL/kg) [I.V.:2500 (21.2 mL/kg); IV Piggyback:1385]  Out: 3730 (31.6 mL/kg) [Urine:3370 (0.8 mL/kg/hr); Drains:210; Blood:150]  Weight: 118 kg     Relevant Results                             Assessment/Plan   Principal Problem:    Meningioma (CMS/HCC)  Active Problems:    HTN (hypertension)    Obesity    Severe asthma    Ariane Chavis is a 48 y.o. female w h/o HTN, melanoma (knee, removed), cervical cancer (in remission), presenting with 2 episodes of transient L side weakness and numbness. Pt states she was suddenly unable to move her leg, and her arm felt weak, and this resolved shortly afterwards. Pt denies any headaches, nausea/vomiting, or any other symptoms. Patient currently feels back to baseline. CTH - left parietal extra-axial calcified mass  MRI brain - left parietal dural-based lesion with surrounding vasogenic edema, c/w meningioma, without brain compression or midline shift     11/9 MRV- meningioma abuts the posterior superior transverse sinus with mild narrowing, no evidence of dural venous thrombosis   CTAP - 5 cm R adnexal mass  11/10 s/p L crani for tumor resection; post op neuro exam change, CTH post op changes    PLAN  SAHRA  MRI this AM   Keppra 500BID, EEG after MRI  [id number]  100 NS   SCDs           Angel Jacob MD      "

## 2023-11-12 PROCEDURE — 2500000002 HC RX 250 W HCPCS SELF ADMINISTERED DRUGS (ALT 637 FOR MEDICARE OP, ALT 636 FOR OP/ED): Performed by: STUDENT IN AN ORGANIZED HEALTH CARE EDUCATION/TRAINING PROGRAM

## 2023-11-12 PROCEDURE — 2500000002 HC RX 250 W HCPCS SELF ADMINISTERED DRUGS (ALT 637 FOR MEDICARE OP, ALT 636 FOR OP/ED)

## 2023-11-12 PROCEDURE — 2500000001 HC RX 250 WO HCPCS SELF ADMINISTERED DRUGS (ALT 637 FOR MEDICARE OP): Performed by: STUDENT IN AN ORGANIZED HEALTH CARE EDUCATION/TRAINING PROGRAM

## 2023-11-12 PROCEDURE — 2500000004 HC RX 250 GENERAL PHARMACY W/ HCPCS (ALT 636 FOR OP/ED): Performed by: STUDENT IN AN ORGANIZED HEALTH CARE EDUCATION/TRAINING PROGRAM

## 2023-11-12 PROCEDURE — 2060000001 HC INTERMEDIATE ICU ROOM DAILY

## 2023-11-12 PROCEDURE — 95716 VEEG EA 12-26HR CONT MNTR: CPT

## 2023-11-12 PROCEDURE — 2500000001 HC RX 250 WO HCPCS SELF ADMINISTERED DRUGS (ALT 637 FOR MEDICARE OP)

## 2023-11-12 PROCEDURE — 96372 THER/PROPH/DIAG INJ SC/IM: CPT

## 2023-11-12 PROCEDURE — 95720 EEG PHY/QHP EA INCR W/VEEG: CPT | Performed by: STUDENT IN AN ORGANIZED HEALTH CARE EDUCATION/TRAINING PROGRAM

## 2023-11-12 PROCEDURE — 2500000005 HC RX 250 GENERAL PHARMACY W/O HCPCS: Performed by: STUDENT IN AN ORGANIZED HEALTH CARE EDUCATION/TRAINING PROGRAM

## 2023-11-12 PROCEDURE — 94640 AIRWAY INHALATION TREATMENT: CPT

## 2023-11-12 PROCEDURE — 2500000004 HC RX 250 GENERAL PHARMACY W/ HCPCS (ALT 636 FOR OP/ED)

## 2023-11-12 RX ORDER — HYDROMORPHONE HYDROCHLORIDE 1 MG/ML
0.2 INJECTION, SOLUTION INTRAMUSCULAR; INTRAVENOUS; SUBCUTANEOUS
Status: DISCONTINUED | OUTPATIENT
Start: 2023-11-12 | End: 2023-11-17 | Stop reason: HOSPADM

## 2023-11-12 RX ORDER — OXYCODONE HYDROCHLORIDE 5 MG/1
10 TABLET ORAL EVERY 6 HOURS PRN
Status: DISCONTINUED | OUTPATIENT
Start: 2023-11-12 | End: 2023-11-17 | Stop reason: HOSPADM

## 2023-11-12 RX ORDER — ALBUTEROL SULFATE 0.83 MG/ML
2.5 SOLUTION RESPIRATORY (INHALATION) 3 TIMES DAILY
Status: DISCONTINUED | OUTPATIENT
Start: 2023-11-12 | End: 2023-11-17 | Stop reason: HOSPADM

## 2023-11-12 RX ORDER — BUDESONIDE 0.5 MG/2ML
0.5 INHALANT ORAL
Status: DISCONTINUED | OUTPATIENT
Start: 2023-11-12 | End: 2023-11-17 | Stop reason: HOSPADM

## 2023-11-12 RX ORDER — HYDROXYZINE HYDROCHLORIDE 25 MG/1
25 TABLET, FILM COATED ORAL DAILY PRN
Status: DISCONTINUED | OUTPATIENT
Start: 2023-11-12 | End: 2023-11-17 | Stop reason: HOSPADM

## 2023-11-12 RX ORDER — HEPARIN SODIUM 5000 [USP'U]/ML
7500 INJECTION, SOLUTION INTRAVENOUS; SUBCUTANEOUS EVERY 8 HOURS SCHEDULED
Status: DISCONTINUED | OUTPATIENT
Start: 2023-11-12 | End: 2023-11-17 | Stop reason: HOSPADM

## 2023-11-12 RX ORDER — OXYCODONE HYDROCHLORIDE 5 MG/1
5 TABLET ORAL EVERY 4 HOURS PRN
Status: DISCONTINUED | OUTPATIENT
Start: 2023-11-12 | End: 2023-11-17 | Stop reason: HOSPADM

## 2023-11-12 RX ORDER — ALBUTEROL SULFATE 0.83 MG/ML
SOLUTION RESPIRATORY (INHALATION)
Status: COMPLETED
Start: 2023-11-12 | End: 2023-11-12

## 2023-11-12 RX ADMIN — ALBUTEROL SULFATE 2.5 MG: 2.5 SOLUTION RESPIRATORY (INHALATION) at 21:21

## 2023-11-12 RX ADMIN — MONTELUKAST SODIUM 10 MG: 10 TABLET, FILM COATED ORAL at 20:35

## 2023-11-12 RX ADMIN — SENNOSIDES AND DOCUSATE SODIUM 2 TABLET: 8.6; 5 TABLET ORAL at 08:10

## 2023-11-12 RX ADMIN — LEVETIRACETAM 500 MG: 5 INJECTION INTRAVENOUS at 16:32

## 2023-11-12 RX ADMIN — FLUTICASONE FUROATE AND VILANTEROL TRIFENATATE 1 PUFF: 100; 25 POWDER RESPIRATORY (INHALATION) at 07:34

## 2023-11-12 RX ADMIN — DEXAMETHASONE SODIUM PHOSPHATE 4 MG: 4 INJECTION, SOLUTION INTRAMUSCULAR; INTRAVENOUS at 02:04

## 2023-11-12 RX ADMIN — ALBUTEROL SULFATE 2.5 MG: 2.5 SOLUTION RESPIRATORY (INHALATION) at 14:47

## 2023-11-12 RX ADMIN — Medication 4 L/MIN: at 14:51

## 2023-11-12 RX ADMIN — ALBUTEROL SULFATE 1.25 MG: 2.5 SOLUTION RESPIRATORY (INHALATION) at 07:32

## 2023-11-12 RX ADMIN — TIOTROPIUM BROMIDE INHALATION SPRAY 2 PUFF: 3.12 SPRAY, METERED RESPIRATORY (INHALATION) at 07:33

## 2023-11-12 RX ADMIN — OXYCODONE HYDROCHLORIDE 5 MG: 5 TABLET ORAL at 17:12

## 2023-11-12 RX ADMIN — LEVETIRACETAM 500 MG: 5 INJECTION INTRAVENOUS at 03:44

## 2023-11-12 RX ADMIN — POLYETHYLENE GLYCOL 3350 17 G: 17 POWDER, FOR SOLUTION ORAL at 20:35

## 2023-11-12 RX ADMIN — OXYCODONE HYDROCHLORIDE 5 MG: 5 TABLET ORAL at 21:20

## 2023-11-12 RX ADMIN — DEXAMETHASONE SODIUM PHOSPHATE 4 MG: 4 INJECTION, SOLUTION INTRAMUSCULAR; INTRAVENOUS at 20:35

## 2023-11-12 RX ADMIN — BUDESONIDE 0.5 MG: 0.5 INHALANT RESPIRATORY (INHALATION) at 14:47

## 2023-11-12 RX ADMIN — DEXAMETHASONE SODIUM PHOSPHATE 4 MG: 4 INJECTION, SOLUTION INTRAMUSCULAR; INTRAVENOUS at 08:11

## 2023-11-12 RX ADMIN — SENNOSIDES AND DOCUSATE SODIUM 2 TABLET: 8.6; 5 TABLET ORAL at 20:35

## 2023-11-12 RX ADMIN — HYDROXYZINE HYDROCHLORIDE 25 MG: 25 TABLET ORAL at 00:37

## 2023-11-12 RX ADMIN — HEPARIN SODIUM 7500 UNITS: 5000 INJECTION INTRAVENOUS; SUBCUTANEOUS at 14:28

## 2023-11-12 RX ADMIN — OXYCODONE HYDROCHLORIDE 5 MG: 5 TABLET ORAL at 00:39

## 2023-11-12 RX ADMIN — DEXAMETHASONE SODIUM PHOSPHATE 4 MG: 4 INJECTION, SOLUTION INTRAMUSCULAR; INTRAVENOUS at 14:15

## 2023-11-12 RX ADMIN — ATENOLOL 50 MG: 50 TABLET ORAL at 08:08

## 2023-11-12 RX ADMIN — HEPARIN SODIUM 7500 UNITS: 5000 INJECTION INTRAVENOUS; SUBCUTANEOUS at 21:19

## 2023-11-12 RX ADMIN — Medication 3 MG: at 20:35

## 2023-11-12 ASSESSMENT — PAIN DESCRIPTION - LOCATION: LOCATION: HEAD

## 2023-11-12 ASSESSMENT — PAIN - FUNCTIONAL ASSESSMENT
PAIN_FUNCTIONAL_ASSESSMENT: 0-10

## 2023-11-12 ASSESSMENT — PAIN SCALES - GENERAL
PAINLEVEL_OUTOF10: 2
PAINLEVEL_OUTOF10: 4
PAINLEVEL_OUTOF10: 4
PAINLEVEL_OUTOF10: 0 - NO PAIN

## 2023-11-12 ASSESSMENT — COGNITIVE AND FUNCTIONAL STATUS - GENERAL
CLIMB 3 TO 5 STEPS WITH RAILING: TOTAL
STANDING UP FROM CHAIR USING ARMS: TOTAL
MOVING FROM LYING ON BACK TO SITTING ON SIDE OF FLAT BED WITH BEDRAILS: A LITTLE
WALKING IN HOSPITAL ROOM: TOTAL
TURNING FROM BACK TO SIDE WHILE IN FLAT BAD: A LOT
MOVING TO AND FROM BED TO CHAIR: TOTAL
MOBILITY SCORE: 9

## 2023-11-12 NOTE — OP NOTE
Craniectomy with Excision Tissue and Navigation, mesh cranioplasty (L) Operative Note     Date: 11/10/2023  OR Location: [facility] OR    Name: Ariane Chavis : 1975, Age: 48 y.o., MRN: [id number], Sex: female    Diagnosis  Pre-op Diagnosis     * Meningioma (CMS/HCC) [D32.9] Post-op Diagnosis     * Meningioma (CMS/HCC) [D32.9]     Procedures  Craniectomy with Excision Tissue and Navigation, mesh cranioplasty  92416 - DC CRANIEC TREPHINE BONE FLP BRAIN TUMOR SUPRTENTOR      Surgeons      * Bayron Mahan - Primary    Resident/Fellow/Other Assistant:  Surgeon(s) and Role:     * Angel Jacob MD - Resident - Assisting    Procedure Summary  Anesthesia: General  ASA: III  Anesthesia Staff: Anesthesiologist: Anjnaa Wan MD  Anesthesia Resident: Jordyn Ayala MD  Estimated Blood Loss: 300mL  Intra-op Medications:   Medication Name Total Dose   chlorthalidone (Hygroton) tablet 25 mg Cannot be calculated   lisinopril tablet 5 mg Cannot be calculated   sodium chloride 0.9% infusion Cannot be calculated         Intraprocedure I/O Totals       None           Specimen:   ID Type Source Tests Collected by Time   1 : BRAIN TUMOR Tissue BRAIN RESECTION SURGICAL PATHOLOGY EXAM Bayron Mahan MD 11/10/2023 1111   2 : 2. BRAIN TUMOR Tissue BRAIN RESECTION SURGICAL PATHOLOGY EXAM Bayron Mahan MD 11/10/2023 1156        Staff:   Circulator: Em Garber RN; Eloisa Gary RN  Scrub Person: Jeremy Roman; Roxie Odell RN         Drains and/or Catheters:   Closed/Suction Drain Scalp Bulb 10 Fr. (Active)   Site Description Healing 23 1600   Dressing Status Clean;Dry 23 1600   Drainage Appearance Other (Comment) 23 0400   Status To bulb suction 23 0400   Output (mL) 10 mL 23 040       Urethral Catheter Non-latex 16 Fr. (Active)   Site Assessment Clean;Skin intact 23   Collection Container Standard drainage bag 23   Securement Method Securing device  (Describe) 11/11/23 2045   Reason for Continuing Urinary Catheterization acute urinary retention 11/12/23 0400   Output (mL) 225 mL 11/12/23 0700   $ Urethral Catheter Charge Indwelling cath 11/11/23 2045       [REMOVED] Urethral Catheter Double-lumen;Non-latex 16 Fr. (Removed)   Site Assessment Clean 11/11/23 0326   Collection Container Standard drainage bag 11/10/23 1600   Securement Method Securing device (Describe) 11/10/23 1600   Reason for Continuing Urinary Catheterization accurate hourly measurement of urine volume in a critically ill patient that cannot be assessed by other volumes and urine collection strategies 11/10/23 1600   Output (mL) 325 mL 11/11/23 0400       [REMOVED] External Urinary Catheter Female (Removed)   External Catheter Status Changed 11/11/23 1600   Securement Method Adhesive device 11/11/23 1600   Output (mL) 0 mL 11/11/23 1600       Tourniquet Times:         Implants:  Implants       Type Name Action Serial No.      Graft GRAFT MATRIX, DURAL, DURAGEN PLUS 3X3 - [id number] Implanted               Findings:      Indications: Ariane Chavis is an 48 y.o. female who is having surgery for Meningioma (CMS/[facility]) [D32.9].      The patient was seen in the preoperative area. The risks, benefits, complications, treatment options, non-operative alternatives, expected recovery and outcomes were discussed with the patient. The possibilities of reaction to medication, pulmonary aspiration, injury to surrounding structures, bleeding, recurrent infection, the need for additional procedures, failure to diagnose a condition, and creating a complication requiring transfusion or operation were discussed with the patient. The patient concurred with the proposed plan, giving informed consent.  The site of surgery was properly noted/marked if necessary per policy. The patient has been actively warmed in preoperative area. Preoperative antibiotics have been ordered and given within 1 hours of incision. Venous  thrombosis prophylaxis have been ordered including bilateral sequential compression devices    Procedure Details: The patient is a 48-year-old woman found to have a left parietal parasagittal meningioma following new onset of transient numbness.  The risk, benefits, and alternatives to surgery were discussed with the patient and she provided informed consent.  Patient brought to the operating room intubated spine on the operating room table.  Following successful induction of general anesthesia and administration of perioperative antibiotics, steroids, and mannitol, the patient's head was fixed in the [facility] head wing.  The family stereotaxy was registered with good precision.  A linear incision along the left parietal region extending her midline was then planned.  The patient's hair was clipped in her house.  Draped in a sterile fashion.  The marked incision was then opened.  The region of the tumor was again confirmed using the stereotaxy.  A high-speed drill was used to drill down the bone over this region as preoperative imaging had shown evidence of bony invasion from the tumor.  The superior sagittal sinus was identified and care was taken to avoid this.  The dura was then opened around the edges of the tumor.  There appeared to be a CSF plane around parts of the tumor.  The tumor was incised and internally debulked with ultrasonic aspiration.  Preliminary pathology was consistent with meningioma.  The tumor border was then dissected free from the surrounding brain.  There was a large cortical vein which appeared to go through the posterior border of the tumor.  This vein was left intact and the tumor along it cauterized.  Similarly, there was some tumor adjacent to the superior sagittal sinus which was cauterized.  And there was no other evidence of residual tumor, hemostasis was obtained electrocautery and Floseal.  Dural substitute was placed over the dural opening.  A titanium mesh cranioplasty was  then used to replace the bone.  The incision was copiously irrigated antibiotic and a solution and closed in anatomic layers.  Complications:  None; patient tolerated the procedure well.    Disposition: PACU - hemodynamically stable.  Condition: stable         Additional Details:      Attending Attestation: I was present and scrubbed for the key portions of the procedure.    Bayron Mahan  Phone Number: [phone]

## 2023-11-12 NOTE — CARE PLAN
The patient's goals for the shift include      The clinical goals for the shift include Patient remains safe, free from injury  Problem: Discharge Planning  Goal: Discharge to home or other facility with appropriate resources  Outcome: Progressing     Problem: Chronic Conditions and Co-morbidities  Goal: Patient's chronic conditions and co-morbidity symptoms are monitored and maintained or improved  Outcome: Progressing     Problem: General Stroke  Goal: Demonstrate improvement in neurological exam throughout the shift  Outcome: Progressing  Goal: Maintain BP within ordered limits throughout shift  Outcome: Progressing  Goal: Participate in treatment (ie., meds, therapy) throughout shift  Outcome: Progressing  Goal: No symptoms of aspiration throughout shift  Outcome: Progressing  Goal: No symptoms of hemorrhage throughout shift  Outcome: Progressing  Goal: Tolerate enteral feeding throughout shift  Outcome: Progressing  Goal: Decreased nausea/vomiting throughout shift  Outcome: Progressing  Goal: Controlled blood glucose throughout shift  Outcome: Progressing  Goal: Out of bed three times today  Outcome: Progressing

## 2023-11-12 NOTE — PROGRESS NOTES
"Ariane Chavis is a 48 y.o. female on day 3 of admission presenting with Meningioma (CMS/HCC).    Subjective   NAEON         Objective     Physical Exam  Awake, Ox3   Face symmetric  L 5/5  R flaccid  Incision c/d/I    Last Recorded Vitals  Blood pressure 120/51, pulse 92, temperature 36.3 °C (97.3 °F), temperature source Temporal, resp. rate 16, height 1.651 m (5' 5\"), weight 118 kg (260 lb 2.3 oz), SpO2 96 %.  Intake/Output last 3 Shifts:  I/O last 3 completed shifts:  In: 2860 (24.2 mL/kg) [P.O.:760; I.V.:1800 (15.3 mL/kg); IV Piggyback:300]  Out: 3840 (32.5 mL/kg) [Urine:3655 (0.9 mL/kg/hr); Drains:185]  Weight: 118 kg     Relevant Results                This patient has a urinary catheter   Reason for the urinary catheter remaining today? Urine catheter unnecessary, will be removed today         Assessment/Plan   Principal Problem:    Meningioma (CMS/HCC)  Active Problems:    HTN (hypertension)    Obesity    Severe asthma    Ariane Chavis is a 48 y.o. female w h/o HTN, melanoma (knee, removed), cervical cancer (in remission), presenting with 2 episodes of transient L side weakness and numbness. Pt states she was suddenly unable to move her leg, and her arm felt weak, and this resolved shortly afterwards. Pt denies any headaches, nausea/vomiting, or any other symptoms. Patient currently feels back to baseline. CTH - left parietal extra-axial calcified mass  MRI brain - left parietal dural-based lesion with surrounding vasogenic edema, c/w meningioma, without brain compression or midline shift     11/9 MRV- meningioma abuts the posterior superior transverse sinus with mild narrowing, no evidence of dural venous thrombosis   CTAP - 5 cm R adnexal mass  11/10 s/p L crani for tumor resection; post op neuro exam change, CTH post op changes  11/11 MRI GTR, punctate diffusion hits but no sig stroke    PLAN:  SAHRA  Dc SGD  EEG  Pain med  PTOT             Caitlin Pena MD      "

## 2023-11-12 NOTE — NURSING NOTE
NSGY resident at bedside to assess pt, update pt and  on plan of care.  Medicated for mild anxiety.

## 2023-11-13 ENCOUNTER — APPOINTMENT (OUTPATIENT)
Dept: RADIOLOGY | Facility: HOSPITAL | Age: 48
DRG: 025 | End: 2023-11-13
Payer: COMMERCIAL

## 2023-11-13 PROCEDURE — 2500000001 HC RX 250 WO HCPCS SELF ADMINISTERED DRUGS (ALT 637 FOR MEDICARE OP)

## 2023-11-13 PROCEDURE — 96372 THER/PROPH/DIAG INJ SC/IM: CPT

## 2023-11-13 PROCEDURE — 2500000002 HC RX 250 W HCPCS SELF ADMINISTERED DRUGS (ALT 637 FOR MEDICARE OP, ALT 636 FOR OP/ED)

## 2023-11-13 PROCEDURE — 97166 OT EVAL MOD COMPLEX 45 MIN: CPT | Mod: GO

## 2023-11-13 PROCEDURE — 72141 MRI NECK SPINE W/O DYE: CPT | Performed by: RADIOLOGY

## 2023-11-13 PROCEDURE — 2500000004 HC RX 250 GENERAL PHARMACY W/ HCPCS (ALT 636 FOR OP/ED)

## 2023-11-13 PROCEDURE — 94640 AIRWAY INHALATION TREATMENT: CPT

## 2023-11-13 PROCEDURE — 97165 OT EVAL LOW COMPLEX 30 MIN: CPT | Mod: GO

## 2023-11-13 PROCEDURE — 1100000001 HC PRIVATE ROOM DAILY

## 2023-11-13 PROCEDURE — 97162 PT EVAL MOD COMPLEX 30 MIN: CPT | Mod: GP | Performed by: PHYSICAL THERAPIST

## 2023-11-13 PROCEDURE — 2500000004 HC RX 250 GENERAL PHARMACY W/ HCPCS (ALT 636 FOR OP/ED): Performed by: STUDENT IN AN ORGANIZED HEALTH CARE EDUCATION/TRAINING PROGRAM

## 2023-11-13 PROCEDURE — 72141 MRI NECK SPINE W/O DYE: CPT

## 2023-11-13 PROCEDURE — 97535 SELF CARE MNGMENT TRAINING: CPT | Mod: GO

## 2023-11-13 RX ORDER — GLYCERIN 1 G/1
1 SUPPOSITORY RECTAL ONCE
Status: COMPLETED | OUTPATIENT
Start: 2023-11-13 | End: 2023-11-13

## 2023-11-13 RX ORDER — DEXAMETHASONE SODIUM PHOSPHATE 4 MG/ML
4 INJECTION, SOLUTION INTRA-ARTICULAR; INTRALESIONAL; INTRAMUSCULAR; INTRAVENOUS; SOFT TISSUE EVERY 8 HOURS
Status: DISCONTINUED | OUTPATIENT
Start: 2023-11-13 | End: 2023-11-14

## 2023-11-13 RX ADMIN — Medication 3 MG: at 18:17

## 2023-11-13 RX ADMIN — MONTELUKAST SODIUM 10 MG: 10 TABLET, FILM COATED ORAL at 20:02

## 2023-11-13 RX ADMIN — GLYCERIN 1 SUPPOSITORY: 2 SUPPOSITORY RECTAL at 09:48

## 2023-11-13 RX ADMIN — HEPARIN SODIUM 7500 UNITS: 5000 INJECTION INTRAVENOUS; SUBCUTANEOUS at 05:34

## 2023-11-13 RX ADMIN — HEPARIN SODIUM 7500 UNITS: 5000 INJECTION INTRAVENOUS; SUBCUTANEOUS at 21:11

## 2023-11-13 RX ADMIN — LEVETIRACETAM 500 MG: 5 INJECTION INTRAVENOUS at 15:31

## 2023-11-13 RX ADMIN — FLUTICASONE FUROATE AND VILANTEROL TRIFENATATE 1 PUFF: 100; 25 POWDER RESPIRATORY (INHALATION) at 08:32

## 2023-11-13 RX ADMIN — ACETAMINOPHEN 650 MG: 325 TABLET ORAL at 05:33

## 2023-11-13 RX ADMIN — TIOTROPIUM BROMIDE INHALATION SPRAY 2 PUFF: 3.12 SPRAY, METERED RESPIRATORY (INHALATION) at 08:32

## 2023-11-13 RX ADMIN — DEXAMETHASONE SODIUM PHOSPHATE 4 MG: 4 INJECTION, SOLUTION INTRAMUSCULAR; INTRAVENOUS at 09:48

## 2023-11-13 RX ADMIN — LEVETIRACETAM 500 MG: 5 INJECTION INTRAVENOUS at 03:53

## 2023-11-13 RX ADMIN — DEXAMETHASONE SODIUM PHOSPHATE 4 MG: 4 INJECTION, SOLUTION INTRAMUSCULAR; INTRAVENOUS at 17:55

## 2023-11-13 RX ADMIN — SENNOSIDES AND DOCUSATE SODIUM 2 TABLET: 8.6; 5 TABLET ORAL at 08:02

## 2023-11-13 RX ADMIN — ATENOLOL 50 MG: 50 TABLET ORAL at 08:02

## 2023-11-13 RX ADMIN — POLYETHYLENE GLYCOL 3350 17 G: 17 POWDER, FOR SOLUTION ORAL at 20:01

## 2023-11-13 RX ADMIN — ALBUTEROL SULFATE 2.5 MG: 2.5 SOLUTION RESPIRATORY (INHALATION) at 21:50

## 2023-11-13 RX ADMIN — ALBUTEROL SULFATE 2.5 MG: 2.5 SOLUTION RESPIRATORY (INHALATION) at 08:37

## 2023-11-13 RX ADMIN — SENNOSIDES AND DOCUSATE SODIUM 2 TABLET: 8.6; 5 TABLET ORAL at 20:01

## 2023-11-13 RX ADMIN — HEPARIN SODIUM 7500 UNITS: 5000 INJECTION INTRAVENOUS; SUBCUTANEOUS at 13:13

## 2023-11-13 RX ADMIN — ALBUTEROL SULFATE 2.5 MG: 2.5 SOLUTION RESPIRATORY (INHALATION) at 14:04

## 2023-11-13 RX ADMIN — ACETAMINOPHEN 650 MG: 325 TABLET ORAL at 20:01

## 2023-11-13 RX ADMIN — DEXAMETHASONE SODIUM PHOSPHATE 4 MG: 4 INJECTION, SOLUTION INTRAMUSCULAR; INTRAVENOUS at 02:02

## 2023-11-13 ASSESSMENT — COGNITIVE AND FUNCTIONAL STATUS - GENERAL
MOVING TO AND FROM BED TO CHAIR: TOTAL
TOILETING: TOTAL
HELP NEEDED FOR BATHING: A LOT
MOBILITY SCORE: 8
WALKING IN HOSPITAL ROOM: TOTAL
TURNING FROM BACK TO SIDE WHILE IN FLAT BAD: A LOT
EATING MEALS: A LITTLE
DRESSING REGULAR UPPER BODY CLOTHING: A LOT
MOVING FROM LYING ON BACK TO SITTING ON SIDE OF FLAT BED WITH BEDRAILS: A LOT
PERSONAL GROOMING: A LITTLE
DRESSING REGULAR LOWER BODY CLOTHING: TOTAL
STANDING UP FROM CHAIR USING ARMS: TOTAL
DAILY ACTIVITIY SCORE: 12
CLIMB 3 TO 5 STEPS WITH RAILING: TOTAL

## 2023-11-13 ASSESSMENT — PAIN SCALES - GENERAL
PAINLEVEL_OUTOF10: 0 - NO PAIN
PAINLEVEL_OUTOF10: 0 - NO PAIN
PAINLEVEL_OUTOF10: 4
PAINLEVEL_OUTOF10: 0 - NO PAIN
PAINLEVEL_OUTOF10: 3

## 2023-11-13 ASSESSMENT — PAIN - FUNCTIONAL ASSESSMENT
PAIN_FUNCTIONAL_ASSESSMENT: 0-10

## 2023-11-13 ASSESSMENT — ACTIVITIES OF DAILY LIVING (ADL)
HOME_MANAGEMENT_TIME_ENTRY: 9
ADL_ASSISTANCE: INDEPENDENT
BATHING_ASSISTANCE: TOTAL

## 2023-11-13 ASSESSMENT — PAIN DESCRIPTION - LOCATION: LOCATION: HEAD

## 2023-11-13 ASSESSMENT — PAIN DESCRIPTION - DESCRIPTORS: DESCRIPTORS: HEADACHE

## 2023-11-13 NOTE — PROGRESS NOTES
Physical Therapy    Physical Therapy Evaluation    Patient Name: Ariane Chavis  MRN: [id number]  Today's Date: 11/13/2023   Time Calculation  Start Time: 0944  Stop Time: 1007  Time Calculation (min): 23 min    Assessment/Plan   PT Assessment  PT Assessment Results: Decreased strength, Decreased endurance, Impaired balance, Impaired tone, Decreased mobility  Rehab Prognosis: Good  Evaluation/Treatment Tolerance: Patient tolerated treatment well  End of Session Communication: Bedside nurse  Assessment Comment: Pt. is a 49 y/o F s/p L crani for tumor resection. Pt. presents with weakness, increased tone, impaired motor planning, impaired balance, and difficulty with all functional mobility compared to baseline. Pt. would benefit from skilled PT while IP to address these deficits.  End of Session Patient Position: Bed, 3 rail up, Alarm off, not on at start of session  IP OR SWING BED PT PLAN  Inpatient or Swing Bed: Inpatient  PT Plan  Treatment/Interventions: Bed mobility, Transfer training, Gait training, Balance training, Strengthening, Endurance training, Therapeutic exercise, Therapeutic activity, Home exercise program  PT Plan: Skilled PT  PT Frequency: 4 times per week  PT Discharge Recommendations: High intensity level of continued care  PT Recommended Transfer Status: Assist x2  PT - OK to Discharge: Yes (PT eval completed and DC recommendation made.)    Subjective     Current Problem:  Patient Active Problem List   Diagnosis    Brain mass    Meningioma (CMS/HCC)    HTN (hypertension)    Obesity    Severe asthma       General Visit Information:  General  Reason for Referral: s/p (L) crani for tumor resection  Past Medical History Relevant to Rehab: HTN, melanoma (knee, removed), cervical cancer (in remission), presenting with 2 episodes of transient L side weakness and numbness  Family/Caregiver Present: Yes  Caregiver Feedback: supportive  present at the bedside throughout session  Co-Treatment:  OT  Co-Treatment Reason: with OT to maximize safety and functional mobility  Prior to Session Communication: Bedside nurse  Patient Position Received: Bed, 3 rail up, Alarm off, not on at start of session  General Comment: Pt. returning from test, required heavy A x 4 to pull over from cart to bed. +R side 0/5 with increased extensor tone. Pt. pleasant and motivated but overwhelmed and tearful throughout session.    Home Living:  Home Living  Type of Home: House  Lives With: Spouse  Home Adaptive Equipment: None  Home Layout: One level  Home Access: Stairs to enter with rails  Entrance Stairs-Number of Steps: 2    Prior Level of Function:  Prior Function Per Pt/Caregiver Report  Level of [place]: Independent with ADLs and functional transfers  ADL Assistance: Independent  Homemaking Assistance: Independent  Ambulatory Assistance: Independent (no AD)  Vocational:  (haridresser)  Prior Function Comments: +driving    Precautions:  Precautions  Medical Precautions: Fall precautions    Vital Signs:     Objective     Pain:  Pain Assessment  Pain Assessment: 0-10  Pain Score: 0 - No pain    Cognition:  Cognition  Overall Cognitive Status: Within Functional Limits  Orientation Level: Oriented X4    General Assessments:         Sensation  Light Touch:  (Pt. reports numbness/heaviness throughout R UE/LE)     Perception  Inattention/Neglect: Cues to attend to right side of body        Static Sitting Balance  Static Sitting-Balance Support: Feet supported (R hand fisted, unable to WB to support self.)  Static Sitting-Level of Assistance:  (fluctuating from mod-CGA for static sitting on EOB. Pt. with LLE extensor tone and heavy L lateral lean. Able to correct and assume midline position with cues)  Static Sitting-Comment/Number of Minutes: Pt. sat EOB ~7 min during session.  Dynamic Sitting Balance  Dynamic Sitting-Balance Support: Feet supported  Dynamic Sitting-Balance:  (mod A secondary to heavy posterior lean with  attempt LE MMT)       Functional Assessments:     Bed Mobility  Bed Mobility: Yes  Bed Mobility 1  Bed Mobility 1: Supine to sitting  Level of Assistance 1: Maximum verbal cues (x2)  Bed Mobility Comments 1: heavy A to advance LE to EOB and for trunk righting. Pt. intially with heavy L lateral lean.  Bed Mobility 2  Bed Mobility  2: Sitting to supine  Level of Assistance 2: Dependent (x2)  Bed Mobility Comments 2: increased A secondary to fatigue s/p unsupported sitting.  Bed Mobility 3  Bed Mobility 3: Scooting  Level of Assistance 3: Dependent (x2)  Bed Mobility Comments 3: boost with draw sheet to reposition in bed at end of session.  Transfers  Transfer:  (Transfers deferred this weakness secondary to R sided weakness, extensor tone, high risk for falls.)  Ambulation/Gait Training  Ambulation/Gait Training Performed:  (deferred)          Extremity/Trunk Assessments:        RLE   RLE : Exceptions to WFL  PROM RLE (degrees)  RLE PROM Comment: DF to neutral, knee/ hip flexion ~90 degrees  Strength RLE  RLE Overall Strength:  (Unable to formally assess secondary to increased extensor tone. No active and purposeful movement of RLE noted during this session.)  Tone RLE  RLE Tone:  (increased extensor tone noted while seated EOB.)  LLE   LLE : Exceptions to WFL  AROM LLE (degrees)  LLE AROM Comment: Grossly WFL  Strength LLE  LLE Overall Strength:  (Grossly > 4/5 throughout)    Outcome Measures:  [facility] Basic Mobility  Turning from your back to your side while in a flat bed without using bedrails: A lot  Moving from lying on your back to sitting on the side of a flat bed without using bedrails: A lot  Moving to and from bed to chair (including a wheelchair): Total  Standing up from a chair using your arms (e.g. wheelchair or bedside chair): Total  To walk in hospital room: Total  Climbing 3-5 steps with railing: Total  Basic Mobility - Total Score: 8                            Goals:  Encounter Problems       Encounter  Problems (Active)       Balance       Pt. will require CGA for static/dynamic sitting balance to safely participate in ADLs  (Progressing)       Start:  11/13/23    Expected End:  12/04/23               Mobility       Pt. will ambulate > 5 ft. with mod A and RW  (Progressing)       Start:  11/13/23    Expected End:  12/04/23            Pt. will perform bed mobility with mod A x 1  (Progressing)       Start:  11/13/23    Expected End:  12/04/23               Pain - Adult          Transfers       Pt. will transfer from STS with mod A x 1 and RW  (Progressing)       Start:  11/13/23    Expected End:  12/04/23                 Education Documentation  Home Exercise Program, taught by Vanessa Caputo, PT at 11/13/2023 12:57 PM.  Learner: Significant Other, Patient  Readiness: Acceptance  Method: Explanation  Response: Verbalizes Understanding  Comment: encouraged pt. to visually attend to R side. Educated pt. and  to perform AROM RLE to maintain ROM and encourage mobility.    Mobility Training, taught by Vanessa Caputo PT at 11/13/2023 12:57 PM.  Learner: Significant Other, Patient  Readiness: Acceptance  Method: Explanation  Response: Verbalizes Understanding  Comment: encouraged pt. to visually attend to R side. Educated pt. and  to perform AROM RLE to maintain ROM and encourage mobility.    Education Comments  No comments found.

## 2023-11-13 NOTE — PROGRESS NOTES
Occupational Therapy    Evaluation    Patient Name: Ariane Chavis  MRN: [id number]  Today's Date: 11/13/2023  Time Calculation  Start Time: 0943  Stop Time: 1007  Time Calculation (min): 24 min        Assessment:  OT Assessment: Pt would benefit from high intensity OT to address ADLs, mobility, and endurance.  End of Session Communication: Bedside nurse  End of Session Patient Position: Bed, 3 rail up, Alarm off, not on at start of session  OT Assessment Results: Decreased ADL status, Decreased upper extremity range of motion, Decreased upper extremity strength, Decreased endurance, Decreased IADLs  Plan:  Treatment Interventions: ADL retraining, Functional transfer training, UE strengthening/ROM, Endurance training  OT Frequency: 2 times per week  OT Discharge Recommendations: High intensity level of continued care  OT - OK to Discharge: Yes  Treatment Interventions: ADL retraining, Functional transfer training, UE strengthening/ROM, Endurance training    Subjective     General:  General  Reason for Referral: s/p (L) crani for tumor resection  Past Medical History Relevant to Rehab: HTN, melanoma (knee, removed), cervical cancer (in remission), presenting with 2 episodes of transient L side weakness and numbness  Family/Caregiver Present: Yes  Caregiver Feedback:  ( at bedside)  Co-Treatment: PT  Co-Treatment Reason:  (2/2 safety to maximize fx transfers/ADLs)  Prior to Session Communication: Bedside nurse  Patient Position Received: Bed, 3 rail up, Alarm off, not on at start of session  Precautions:  Medical Precautions: Fall precautions    Pain:  Pain Assessment  Pain Score: 0 - No pain    Objective   Cognition:  Orientation Level: Oriented X4           Home Living:  Type of Home: House  Lives With: Spouse  Home Adaptive Equipment: None  Home Layout: One level  Home Access: Stairs to enter with rails  Entrance Stairs-Number of Steps: 2  Prior Function:  Level of [place]: Independent with ADLs and  functional transfers  Prior Function Comments:  (+driving, +works as hairdresser)       ADL:  Grooming Assistance: Minimal (seated EOB, pt able to grasp bath wipe with (L) hand and perform light face hygiene, min assist to thoroughly assess)  Bathing Assistance: Total  Bathing Deficit:  (back)  LE Dressing Assistance: Total  LE Dressing Deficit: Don/doff R sock, Don/doff L sock       Bed Mobility/Transfers: Bed Mobility 1  Bed Mobility 1: Supine to sitting  Level of Assistance 1: Maximum assistance (x2)  Bed Mobility 2  Bed Mobility  2: Sitting to supine  Level of Assistance 2: Dependent (x2)  Bed Mobility 3  Bed Mobility 3: Scooting  Level of Assistance 3: Dependent    Sitting Balance:  Static Sitting Balance  Static Sitting-Level of Assistance:  (fluctuates between CGA - mod assist x 1, pt initially with heavy lateral lean to the (L), required mod assist to support trunk in midline, but patient progressed to CGA for brief periods)  Static Sitting-Comment/Number of Minutes: 8     Strength:  Strength Comments:  ((R) UE flaccid with increased tone at end range, (L) UE 5/5 in all planes.)      Outcome Measures:[facility] Daily Activity  Putting on and taking off regular lower body clothing: Total  Bathing (including washing, rinsing, drying): A lot  Putting on and taking off regular upper body clothing: A lot  Toileting, which includes using toilet, bedpan or urinal: Total  Taking care of personal grooming such as brushing teeth: A little  Eating Meals: A little  Daily Activity - Total Score: 12   and Brief Confusion Assessment Method (bCAM)  CAM Result: CAM -    Education Documentation  Precautions, taught by Donya Bales OT at 11/13/2023  1:16 PM.  Learner: Patient  Readiness: Acceptance  Method: Explanation  Response: Verbalizes Understanding    ADL Training, taught by Donya Bales OT at 11/13/2023  1:16 PM.  Learner: Patient  Readiness: Acceptance  Method: Explanation  Response: Verbalizes Understanding    Education  Comments  No comments found.             Goals:  Encounter Problems       Encounter Problems (Active)       ADLs       Pt will be mod assist x 1 vinicius/doffing pants at chair level       Start:  11/13/23    Expected End:  11/27/23               BALANCE       Pt will be SBA-CGA seated EOB while performing grooming ADLs       Start:  11/13/23    Expected End:  11/27/23               Balance       Pt. will require CGA for static/dynamic sitting balance to safely participate in ADLs  (Progressing)       Start:  11/13/23    Expected End:  12/04/23               Mobility       Pt. will ambulate > 5 ft. with mod A and RW  (Progressing)       Start:  11/13/23    Expected End:  12/04/23            Pt. will perform bed mobility with mod A x 1  (Progressing)       Start:  11/13/23    Expected End:  12/04/23               TRANSFERS       Pt will be max assist x 1 seated EOB to BSC to complete toileting ADLs       Start:  11/13/23    Expected End:  11/27/23               Transfers       Pt. will transfer from STS with mod A x 1 and RW  (Progressing)       Start:  11/13/23    Expected End:  12/04/23

## 2023-11-13 NOTE — PROGRESS NOTES
"Ariane Chavis is a 48 y.o. female on day 4 of admission presenting with Meningioma (CMS/HCC).    Subjective   NAEON. Tingling in R arm and leg. Able to slightly lift R leg off bed         Objective     Physical Exam  Awake, Ox3   Face symmetric  L 5/5  RLE prox 2/5 otherwise, R flaccid  Incision c/d/I    Last Recorded Vitals  Blood pressure 128/69, pulse 82, temperature 36.1 °C (97 °F), resp. rate 21, height 1.651 m (5' 5\"), weight 118 kg (260 lb 2.3 oz), SpO2 95 %.  Intake/Output last 3 Shifts:  I/O last 3 completed shifts:  In: 2310 (19.6 mL/kg) [P.O.:1510; I.V.:600 (5.1 mL/kg); IV Piggyback:200]  Out: 4940 (41.9 mL/kg) [Urine:4835 (1.1 mL/kg/hr); Drains:105]  Weight: 118 kg     Relevant Results                This patient has a urinary catheter   Reason for the urinary catheter remaining today? Urine catheter unnecessary, will be removed today         Assessment/Plan   Principal Problem:    Meningioma (CMS/HCC)  Active Problems:    HTN (hypertension)    Obesity    Severe asthma    Ariane Chavis is a 48 y.o. female w h/o HTN, melanoma (knee, removed), cervical cancer (in remission), presenting with 2 episodes of transient L side weakness and numbness. Pt states she was suddenly unable to move her leg, and her arm felt weak, and this resolved shortly afterwards. Pt denies any headaches, nausea/vomiting, or any other symptoms. Patient currently feels back to baseline. CTH - left parietal extra-axial calcified mass  MRI brain - left parietal dural-based lesion with surrounding vasogenic edema, c/w meningioma, without brain compression or midline shift     11/9 MRV- meningioma abuts the posterior superior transverse sinus with mild narrowing, no evidence of dural venous thrombosis   CTAP - 5 cm R adnexal mass  11/10 s/p L crani for tumor resection; post op neuro exam change, CTH post op changes  11/11 MRI GTR, punctate diffusion hits but no sig stroke    PLAN:  Floor  Void trial  Pain control  Pelvic US for adnexal " mass  PTOT             Gilma Calabrese MD

## 2023-11-14 ENCOUNTER — APPOINTMENT (OUTPATIENT)
Dept: RADIOLOGY | Facility: HOSPITAL | Age: 48
DRG: 025 | End: 2023-11-14
Payer: COMMERCIAL

## 2023-11-14 ENCOUNTER — APPOINTMENT (OUTPATIENT)
Dept: NEUROLOGY | Facility: HOSPITAL | Age: 48
DRG: 025 | End: 2023-11-14
Payer: COMMERCIAL

## 2023-11-14 LAB
ALBUMIN SERPL BCP-MCNC: 3.3 G/DL (ref 3.4–5)
ANION GAP SERPL CALC-SCNC: 14 MMOL/L (ref 10–20)
BUN SERPL-MCNC: 15 MG/DL (ref 6–23)
CALCIUM SERPL-MCNC: 8.5 MG/DL (ref 8.6–10.6)
CHLORIDE SERPL-SCNC: 101 MMOL/L (ref 98–107)
CO2 SERPL-SCNC: 26 MMOL/L (ref 21–32)
CREAT SERPL-MCNC: 0.33 MG/DL (ref 0.5–1.05)
ERYTHROCYTE [DISTWIDTH] IN BLOOD BY AUTOMATED COUNT: 14.2 % (ref 11.5–14.5)
GFR SERPL CREATININE-BSD FRML MDRD: >90 ML/MIN/1.73M*2
GLUCOSE SERPL-MCNC: 186 MG/DL (ref 74–99)
HCT VFR BLD AUTO: 36.3 % (ref 36–46)
HGB BLD-MCNC: 12.1 G/DL (ref 12–16)
MCH RBC QN AUTO: 31.8 PG (ref 26–34)
MCHC RBC AUTO-ENTMCNC: 33.3 G/DL (ref 32–36)
MCV RBC AUTO: 95 FL (ref 80–100)
NRBC BLD-RTO: 0 /100 WBCS (ref 0–0)
PHOSPHATE SERPL-MCNC: 3.2 MG/DL (ref 2.5–4.9)
PLATELET # BLD AUTO: 343 X10*3/UL (ref 150–450)
POTASSIUM SERPL-SCNC: 4.2 MMOL/L (ref 3.5–5.3)
RBC # BLD AUTO: 3.81 X10*6/UL (ref 4–5.2)
SODIUM SERPL-SCNC: 137 MMOL/L (ref 136–145)
WBC # BLD AUTO: 11.8 X10*3/UL (ref 4.4–11.3)

## 2023-11-14 PROCEDURE — 76856 US EXAM PELVIC COMPLETE: CPT | Mod: GC | Performed by: RADIOLOGY

## 2023-11-14 PROCEDURE — 2500000002 HC RX 250 W HCPCS SELF ADMINISTERED DRUGS (ALT 637 FOR MEDICARE OP, ALT 636 FOR OP/ED)

## 2023-11-14 PROCEDURE — 96372 THER/PROPH/DIAG INJ SC/IM: CPT

## 2023-11-14 PROCEDURE — 97112 NEUROMUSCULAR REEDUCATION: CPT | Mod: GP | Performed by: PHYSICAL THERAPIST

## 2023-11-14 PROCEDURE — 36415 COLL VENOUS BLD VENIPUNCTURE: CPT | Performed by: NURSE PRACTITIONER

## 2023-11-14 PROCEDURE — 94640 AIRWAY INHALATION TREATMENT: CPT

## 2023-11-14 PROCEDURE — 95700 EEG CONT REC W/VID EEG TECH: CPT

## 2023-11-14 PROCEDURE — 80069 RENAL FUNCTION PANEL: CPT | Performed by: NURSE PRACTITIONER

## 2023-11-14 PROCEDURE — 95720 EEG PHY/QHP EA INCR W/VEEG: CPT | Performed by: PSYCHIATRY & NEUROLOGY

## 2023-11-14 PROCEDURE — 85027 COMPLETE CBC AUTOMATED: CPT | Performed by: NURSE PRACTITIONER

## 2023-11-14 PROCEDURE — 2500000004 HC RX 250 GENERAL PHARMACY W/ HCPCS (ALT 636 FOR OP/ED)

## 2023-11-14 PROCEDURE — 2500000004 HC RX 250 GENERAL PHARMACY W/ HCPCS (ALT 636 FOR OP/ED): Performed by: STUDENT IN AN ORGANIZED HEALTH CARE EDUCATION/TRAINING PROGRAM

## 2023-11-14 PROCEDURE — 76856 US EXAM PELVIC COMPLETE: CPT

## 2023-11-14 PROCEDURE — 76830 TRANSVAGINAL US NON-OB: CPT | Performed by: RADIOLOGY

## 2023-11-14 PROCEDURE — 2500000001 HC RX 250 WO HCPCS SELF ADMINISTERED DRUGS (ALT 637 FOR MEDICARE OP)

## 2023-11-14 PROCEDURE — 1100000001 HC PRIVATE ROOM DAILY

## 2023-11-14 PROCEDURE — 99222 1ST HOSP IP/OBS MODERATE 55: CPT

## 2023-11-14 RX ORDER — DEXAMETHASONE SODIUM PHOSPHATE 4 MG/ML
4 INJECTION, SOLUTION INTRA-ARTICULAR; INTRALESIONAL; INTRAMUSCULAR; INTRAVENOUS; SOFT TISSUE EVERY 12 HOURS
Status: DISCONTINUED | OUTPATIENT
Start: 2023-11-14 | End: 2023-11-15

## 2023-11-14 RX ADMIN — DEXAMETHASONE SODIUM PHOSPHATE 4 MG: 4 INJECTION, SOLUTION INTRAMUSCULAR; INTRAVENOUS at 13:46

## 2023-11-14 RX ADMIN — BUDESONIDE 0.5 MG: 0.5 INHALANT RESPIRATORY (INHALATION) at 21:44

## 2023-11-14 RX ADMIN — HEPARIN SODIUM 7500 UNITS: 5000 INJECTION INTRAVENOUS; SUBCUTANEOUS at 21:48

## 2023-11-14 RX ADMIN — LEVETIRACETAM 500 MG: 5 INJECTION INTRAVENOUS at 04:30

## 2023-11-14 RX ADMIN — ALBUTEROL SULFATE 2.5 MG: 2.5 SOLUTION RESPIRATORY (INHALATION) at 08:50

## 2023-11-14 RX ADMIN — Medication 3 MG: at 21:48

## 2023-11-14 RX ADMIN — POLYETHYLENE GLYCOL 3350 17 G: 17 POWDER, FOR SOLUTION ORAL at 21:49

## 2023-11-14 RX ADMIN — ACETAMINOPHEN 650 MG: 325 TABLET ORAL at 22:02

## 2023-11-14 RX ADMIN — MONTELUKAST SODIUM 10 MG: 10 TABLET, FILM COATED ORAL at 21:48

## 2023-11-14 RX ADMIN — HEPARIN SODIUM 7500 UNITS: 5000 INJECTION INTRAVENOUS; SUBCUTANEOUS at 05:01

## 2023-11-14 RX ADMIN — ALBUTEROL SULFATE 2.5 MG: 2.5 SOLUTION RESPIRATORY (INHALATION) at 21:46

## 2023-11-14 RX ADMIN — ALBUTEROL SULFATE 2.5 MG: 2.5 SOLUTION RESPIRATORY (INHALATION) at 14:33

## 2023-11-14 RX ADMIN — LEVETIRACETAM 500 MG: 5 INJECTION INTRAVENOUS at 16:48

## 2023-11-14 RX ADMIN — BUDESONIDE 0.5 MG: 0.5 INHALANT RESPIRATORY (INHALATION) at 08:50

## 2023-11-14 RX ADMIN — FLUTICASONE FUROATE AND VILANTEROL TRIFENATATE 1 PUFF: 100; 25 POWDER RESPIRATORY (INHALATION) at 08:50

## 2023-11-14 RX ADMIN — SENNOSIDES AND DOCUSATE SODIUM 2 TABLET: 8.6; 5 TABLET ORAL at 08:55

## 2023-11-14 RX ADMIN — ACETAMINOPHEN 650 MG: 325 TABLET ORAL at 08:56

## 2023-11-14 RX ADMIN — ATENOLOL 50 MG: 50 TABLET ORAL at 08:56

## 2023-11-14 RX ADMIN — ACETAMINOPHEN 650 MG: 325 TABLET ORAL at 02:09

## 2023-11-14 RX ADMIN — TIOTROPIUM BROMIDE INHALATION SPRAY 2 PUFF: 3.12 SPRAY, METERED RESPIRATORY (INHALATION) at 08:50

## 2023-11-14 RX ADMIN — DEXAMETHASONE SODIUM PHOSPHATE 4 MG: 4 INJECTION, SOLUTION INTRAMUSCULAR; INTRAVENOUS at 02:09

## 2023-11-14 RX ADMIN — HEPARIN SODIUM 7500 UNITS: 5000 INJECTION INTRAVENOUS; SUBCUTANEOUS at 13:47

## 2023-11-14 ASSESSMENT — PAIN SCALES - GENERAL
PAINLEVEL_OUTOF10: 5 - MODERATE PAIN
PAINLEVEL_OUTOF10: 0 - NO PAIN
PAINLEVEL_OUTOF10: 3
PAINLEVEL_OUTOF10: 0 - NO PAIN

## 2023-11-14 ASSESSMENT — COGNITIVE AND FUNCTIONAL STATUS - GENERAL
MOVING TO AND FROM BED TO CHAIR: TOTAL
WALKING IN HOSPITAL ROOM: TOTAL
MOVING FROM LYING ON BACK TO SITTING ON SIDE OF FLAT BED WITH BEDRAILS: A LOT
CLIMB 3 TO 5 STEPS WITH RAILING: TOTAL
MOBILITY SCORE: 8
TURNING FROM BACK TO SIDE WHILE IN FLAT BAD: A LOT
STANDING UP FROM CHAIR USING ARMS: TOTAL

## 2023-11-14 ASSESSMENT — PAIN - FUNCTIONAL ASSESSMENT
PAIN_FUNCTIONAL_ASSESSMENT: 0-10

## 2023-11-14 ASSESSMENT — PAIN DESCRIPTION - LOCATION
LOCATION: HEAD
LOCATION: HEAD

## 2023-11-14 ASSESSMENT — PAIN DESCRIPTION - DESCRIPTORS: DESCRIPTORS: HEADACHE

## 2023-11-14 NOTE — PROGRESS NOTES
"Ariane Chavis is a 48 y.o. female on day 5 of admission presenting with Meningioma (CMS/HCC).    Subjective   NAEON          Objective     Physical Exam  Awake, Ox3   Face symmetric  L 5/5  RLE prox 2/5 otherwise, R flaccid  Incision c/d/I    Last Recorded Vitals  Blood pressure 130/81, pulse 69, temperature 35.9 °C (96.6 °F), temperature source Temporal, resp. rate 18, height 1.651 m (5' 5\"), weight 118 kg (260 lb 2.3 oz), SpO2 94 %.  Intake/Output last 3 Shifts:  I/O last 3 completed shifts:  In: 950 (8.1 mL/kg) [P.O.:750; IV Piggyback:200]  Out: 4015 (34 mL/kg) [Urine:4005 (0.9 mL/kg/hr); Drains:10]  Weight: 118 kg     Relevant Results                     Assessment/Plan   Principal Problem:    Meningioma (CMS/HCC)  Active Problems:    HTN (hypertension)    Obesity    Severe asthma    Ariane Chavis is a 48 y.o. female w h/o HTN, melanoma (knee, removed), cervical cancer (in remission), presenting with 2 episodes of transient L side weakness and numbness. Pt states she was suddenly unable to move her leg, and her arm felt weak, and this resolved shortly afterwards. Pt denies any headaches, nausea/vomiting, or any other symptoms. Patient currently feels back to baseline. CTH - left parietal extra-axial calcified mass  MRI brain - left parietal dural-based lesion with surrounding vasogenic edema, c/w meningioma, without brain compression or midline shift     11/9 MRV- meningioma abuts the posterior superior transverse sinus with mild narrowing, no evidence of dural venous thrombosis   CTAP - 5 cm R adnexal mass  11/10 s/p L crani for tumor resection; post op neuro exam change, CTH post op changes  11/11 MRI GTR, punctate diffusion hits but no sig stroke     PLAN:  Floor  Void trial  Pain control  Pelvic US for adnexal mass  PTOT- rehab             Caitlin Pena MD      "

## 2023-11-14 NOTE — PROGRESS NOTES
Care Transitions Progress note  Plan per medical team: pod4 L Crani tumor resection  PT:OT High intensity  Status : inpatient  [facility]  Choice of patient for rehab is [facility].  They are able to accept if no immediate plan for chemo/radiation  Barriers :none'  Adod: 3 days

## 2023-11-14 NOTE — NURSING NOTE
Patient still having difficulty urinating on own since removal of joseph catheter requiring three straight catheterization. RN recommended joseph be placed again til further notice. Awaiting intervention from medical team.

## 2023-11-14 NOTE — SIGNIFICANT EVENT
"Preliminary EEG Report    This vEEG is indicative of a left hemispheric structural lesion and skull defect.     This EEG was read up until 13:39 on 11/14/23.     The final impression will be available tomorrow under Chart Review in the Media tab.   To discontinue video EEG, place \"Discontinue Continuous VEEG\" order.     Linda Montgomery MD  Epilepsy Fellow [id number]     " No

## 2023-11-14 NOTE — PROGRESS NOTES
Physical Therapy    Physical Therapy Treatment    Patient Name: Ariane Chavis  MRN: [id number]  Today's Date: 11/14/2023  Time Calculation  Start Time: 1041  Stop Time: 1119  Time Calculation (min): 38 min       Assessment/Plan   PT Assessment  PT Assessment Results: Decreased strength, Decreased endurance, Impaired balance, Impaired tone, Decreased mobility, Decreased coordination, Decreased cognition, Impaired vision, Impaired sensation, Obesity, Decreased skin integrity  Rehab Prognosis: Good  Evaluation/Treatment Tolerance: Patient tolerated treatment well  Medical Staff Made Aware: Yes (RN)  Strengths: Ability to acquire knowledge, Coping skills, Support of Caregivers, Premorbid level of function  Barriers to Participation:  (none)  End of Session Communication: Bedside nurse, PCT/NA/CTA  Assessment Comment: Pt. is a 47 y/o female with Left parietal meningioma s/p crani/resection with Right UE/LE hypertonus, decreased isolated movement, impaired balance, and intermittent tearfulness/possible lability. Recommend high intensity therapy as pt needs all 3 therapies, appears very motivated, has great support, is not at [facility], is making gains and has complex rehab and equipment needs.  End of Session Patient Position: Bed, 3 rail up, Alarm on  PT Plan  Inpatient/Swing Bed or Outpatient: Inpatient  PT Plan  Treatment/Interventions: Bed mobility, Transfer training, Gait training, Balance training, Neuromuscular re-education, Strengthening, Endurance training, Range of motion, Therapeutic exercise, Therapeutic activity, Home exercise program, Postural re-education, Positioning  PT Plan: Skilled PT  PT Frequency: 5 times per week  PT Discharge Recommendations: High intensity level of continued care  Equipment Recommended upon Discharge:  (TBD)  PT Recommended Transfer Status: Assist x2 (to stand; not yet safe to transfer to chair)  PT - OK to Discharge: Yes (PT evaluation complete and DC recs made)      General Visit  Information:   PT  Visit  PT Received On: 11/14/23  General  Reason for Referral: Admitted 11/10 presenting with 2 episodes of transient L side weakness and numbness; dx: Left parietal menincioma, s/p L crani for tumor resection  Past Medical History Relevant to Rehab: PMHx: HTN, melanoma (knee, removed), cervical cancer (in remission), asthma,  Family/Caregiver Present: Yes  Caregiver Feedback:  present and engaged, asking for tips on how to do AAROM with pt.  Prior to Session Communication: Bedside nurse, PCT/NA/CTA  Patient Position Received: Bed, 3 rail up, Alarm on (partial right sidelying)  Preferred Learning Style: verbal, kinesthetic  General Comment: Pt supine alert, labile/tearful at times but very engaged; present and very engaged. Improved ability to sit supported (cues/assist for midline) and come to stand x 2 with A +2 as noted briefly. Volitional movement Right upper traps, elbow extension intermitently, hip adduction and knee extension (variable).    Subjective   Precautions:  Precautions  Hearing/Visual Limitations: has inattention or field cut Right side (TBD)  Medical Precautions: Fall precautions (asthma)  Post-Surgical Precautions:  (Right paresis, crani, Right UE/LE extensor hypertonus, mild lability)  Vital Signs:       Objective   Pain:  Pain Assessment  Pain Assessment: 0-10  Pain Score:  (intermittent Right breast pain with comeing to stand (where PT was guarding Right UE/trunk) but didn't rate)  Pain Interventions: Repositioned  Response to Interventions: pt comfortable with no pain at end of sessoin  Cognition:  Cognition  Overall Cognitive Status:  (possibly impaired)  Orientation Level: Oriented X4  Problem Solving: Exceptions to WFL  Complex Functional Tasks: Impaired  Postural Control:  Postural Control  Postural Control: Impaired  Trunk Control: mod A 1-2 (highly variable) with posterior lean, lean to left, decreased awareness of midline  Righting Reactions: delayed  bilaterally (R > L)  Protective Responses: no present on right, delayed on left  Posture Comment: rounded shoulders, forward head, posterior pelvic tilt  Extremity/Trunk Assessments:  RUE   RUE : Exceptions to WFL  RUE Strength  RUE Overall Strength:  (slight shoulder shrug with repetitoin/tactile cues, slight elbow extension (hypertonus limiting pts ability to isolate movements))  RUE Tone  RUE Tone: Hypertonic (Right elbow extension and shoulder internal rotation with attempts to move Right arm in any way)     RLE   RLE : Exceptions to WFL  Strength RLE  RLE Overall Strength:  (limited by hypertonus; Right hip adduction 2-, knee extension 2- (variable))  Tone RLE  RLE Tone: Hypertonic (knee extension with any attempts to move Right LE)     Activity Tolerance:  Activity Tolerance  Endurance: Tolerates 10 - 20 min exercise with multiple rests (rest breaks due to fatigue)  Treatments:  Balance/Neuromuscular Re-Education  Balance/Neuromuscular Re-Education Activity Performed: Yes  Balance/Neuromuscular Re-Education Activity 1: faciliation of rolling both directions with forced use/positioning of Right UE/LE and also comeing Right sidelying to/from sit (HOB elevated 65 degrees, use of drawsheet and rail as noted)  Balance/Neuromuscular Re-Education Activity 2: static sitting midline activities using visual cues, repetitive cues (verbal, tactile) and assist as noted in balance section  Balance/Neuromuscular Re-Education Activity 3: sit to/from stand with arm-in-arm assist +2 no device as noted in transfer session with focus on erect trunk/head    Bed Mobility  Bed Mobility: Yes  Bed Mobility 1  Bed Mobility 1: Rolling right, Rolling left  Level of Assistance 1:  (mod/max A to Left (cues) and min/mod A to right (using rail and drawsheet))  Bed Mobility 2  Bed Mobility  2: Side lying right to sit, Sitting to supine (sidely Right to/from sit with mod A +1-2 (HOB elevated 65 degrees, use of rail and drawsheet) incorporating  use of Right UE)  Bed Mobility 3  Bed Mobility 3: Scooting  Level of Assistance 3: Dependent (+2 using drawsheet)    Ambulation/Gait Training  Ambulation/Gait Training Performed: No  Transfers  Transfer: Yes  Transfer 1  Transfer From 1: Sit to, Stand to  Transfer to 1: Sit, Stand  Technique 1: Sit to stand, Stand to sit  Transfer Device 1:  (no device; PT/CTA arm-in-arm side by side assist with LEs blocked, cues to flex at hips/trunk and stand erect later in task)  Transfer Level of Assistance 1: Maximum assistance, +2, Moderate tactile cues, Moderate verbal cues (mod/max A +2)    Outcome Measures:  [facility] Basic Mobility  Turning from your back to your side while in a flat bed without using bedrails: A lot  Moving from lying on your back to sitting on the side of a flat bed without using bedrails: A lot  Moving to and from bed to chair (including a wheelchair): Total  Standing up from a chair using your arms (e.g. wheelchair or bedside chair): Total  To walk in hospital room: Total  Climbing 3-5 steps with railing: Total  Basic Mobility - Total Score: 8    Education Documentation  Home Exercise Program, taught by Nelda Ayala PT at 11/14/2023 11:39 AM.  Learner: Significant Other, Patient  Readiness: Eager  Method: Explanation, Demonstration  Response: Verbalizes Understanding, Demonstrated Understanding  Comment: showed  how to do AAROM: Right hip abd/add, knee flex/ext and ankle DF (PROM)    Mobility Training, taught by Nelda Ayala PT at 11/14/2023 11:39 AM.  Learner: Significant Other, Patient  Readiness: Eager  Method: Explanation, Demonstration  Response: Demonstrated Understanding, Needs Reinforcement  Comment: midling sitting, rolling, coming to/from sit    Education Comments  No comments found.        OP EDUCATION:       Encounter Problems       Encounter Problems (Active)       Balance       Pt. will require CGA for static/dynamic sitting balance to safely participate in ADLs   (Progressing)       Start:  11/13/23    Expected End:  12/04/23               Mobility       Pt. will ambulate > 5 ft. with mod A and RW  (Not Progressing)       Start:  11/13/23    Expected End:  12/04/23            Pt. will perform bed mobility with mod A x 1  (Progressing)       Start:  11/13/23    Expected End:  12/04/23               Pain - Adult          Transfers       Pt. will transfer from STS with mod A x 1 and RW  (Progressing)       Start:  11/13/23    Expected End:  12/04/23

## 2023-11-14 NOTE — CONSULTS
Consults    History Of Present Illness  Ariane hCavis is a 48 y.o. female presenting with right sided weakness and tingling of her right upper and lower extremity in the setting of meningioma tumor resection on 11/10/23.  Patient reports that prior to surgery, on 11/03  she experienced an episode of of right sided leg weakness and tingling at 3 AM,  this episode lasted for about 30 minutes, during this episode she bumped into furniture and had to use furniture as support to get back to bed, the issue resolved without follow-up. Then on 11/07, she had an episode of complete right-sided numbness, tingling, and weakness from her face downwards with symptoms lasting approximately 20-35 minutes. She stated that she presented to the ED following this event. Both episodes resolved with return to her baseline strength. Following surgery, patient has had right sided upper and lower extremity weakness. She states that her limbs feal heavy and she is experiencing shooting short bursts of pain down her right upper extremity. She states that she feels contraction and spasms of her right arm today. This spasms are brought on by motor/sensory exams and also occur spontaneously. Patient and  endorse slight incremental improvement in sensation and strength since post-op day 1.       Past Medical History  Past Medical History:   Diagnosis Date    Acute candidiasis of vulva and vagina 07/01/2016    Yeast vaginitis    Acute laryngitis 05/29/2017    Acute laryngitis    Acute sinusitis, unspecified 03/29/2020    Acute rhinosinusitis    Acute tonsillitis, unspecified 10/20/2016    Acute tonsillitis    Adjustment disorder with mixed anxiety and depressed mood 03/02/2016    Adjustment reaction with anxiety and depression    Body mass index (BMI)40.0-44.9, adult 03/29/2020    Body mass index (BMI) of 40.0 to 44.9 in adult    Body mass index (BMI)40.0-44.9, adult 01/18/2020    Body mass index (BMI) of 40.0 to 44.9 in adult    Contact  with and (suspected) exposure to unspecified communicable disease 03/24/2020    Exposure to communicable diseases    Enlarged lymph nodes, unspecified 03/21/2016    Glands swollen    Essential (primary) hypertension 09/05/2014    Benign essential hypertension    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    Facial twitching    Mild intermittent asthma with (acute) exacerbation 01/11/2017    Mild intermittent reactive airway disease with acute exacerbation    Other conditions influencing health status 05/29/2017    History of cough    Other conditions influencing health status     History of pregnancy    Personal history of malignant melanoma of skin 12/15/2021    History of malignant melanoma    Personal history of malignant neoplasm of cervix uteri     History of malignant neoplasm of cervix uteri    Personal history of other diseases of the circulatory system 10/25/2014    History of varicose veins    Personal history of other diseases of the circulatory system     Personal history of supraventricular tachycardia    Personal history of other diseases of the female genital tract 02/06/2015    History of dysfunctional uterine bleeding    Personal history of other diseases of the female genital tract 12/22/2017    History of abnormal cervical Papanicolaou smear    Personal history of other diseases of the respiratory system 10/18/2016    History of pharyngitis    Personal history of other diseases of the respiratory system 04/03/2019    History of acute bronchitis with bronchospasm    Personal history of other diseases of the respiratory system 05/29/2017    History of acute sinusitis    Personal history of other diseases of the respiratory system 12/22/2017    History of acute bronchitis with bronchospasm    Personal history of other endocrine, nutritional and metabolic disease 01/16/2019    History of obesity    Personal history of other specified conditions 03/02/2016    History of lymphadenopathy    Personal history of other  specified conditions     History of abnormal Pap smear    Unspecified asthma with (acute) exacerbation 03/31/2019    Acute asthma exacerbation    Unspecified open wound of other finger without damage to nail, initial encounter 11/26/2019    Avulsion of skin of index finger, initial encounter    Urinary tract infection, site not specified 01/16/2019    Frequent UTI    Urinary tract infection, site not specified 06/28/2016    Acute UTI     Surgical History  Past Surgical History:   Procedure Laterality Date    CERVICAL BIOPSY  W/ LOOP ELECTRODE EXCISION  10/25/2014    Cervical Loop Electrosurgical Excision (LEEP)    HYSTERECTOMY  05/19/2015    Hysterectomy    MR HEAD ANGIO WO IV CONTRAST  11/11/2023    MR HEAD ANGIO WO IV CONTRAST 11/11/2023 [facility] MRI    MR NECK ANGIO WO IV CONTRAST  11/11/2023    MR NECK ANGIO WO IV CONTRAST 11/11/2023 [facility] MRI    OTHER SURGICAL HISTORY  03/02/2016    Vaginal Surg Insertion Of Mesh For Pelvic Floor Repair    OTHER SURGICAL HISTORY  10/25/2014    Biopsy Skin     Social History  Social History     Tobacco Use    Smoking status: Never    Smokeless tobacco: Never   Vaping Use    Vaping Use: Never used   Substance Use Topics    Alcohol use: Never    Drug use: Never     Allergies  Grass pollen  Medications Prior to Admission   Medication Sig Dispense Refill Last Dose    albuterol 90 mcg/actuation inhaler Inhale 2 puffs every 6 hours if needed for wheezing.       ammonium lactate (Amlactin) 12 % cream Apply 1 Application topically if needed for dry skin.       atenoloL-chlorthalidone (Tenoretic) 50-25 mg tablet Take 1 tablet by mouth once daily.   11/8/2023    azelastine (Optivar) 0.05 % ophthalmic solution Administer 1 drop into both eyes 2 times a day as needed (allergies).       budesonide (Pulmicort) 1 mg/2 mL nebulizer solution Inhale 2 mL (1 mg) twice a day.   11/8/2023    cetirizine (ZyrTEC) 5 mg tablet Take 1 tablet (5 mg) by mouth once daily.       fluticasone (Flonase) 50  mcg/actuation nasal spray Administer 1 spray into each nostril once daily. Shake gently. Before first use, prime pump. After use, clean tip and replace cap.       fluticasone-umeclidin-vilanter (Trelegy Ellipta) 200-62.5-25 mcg blister with device Inhale 1 puff once daily.   11/8/2023    levalbuterol (Xopenex) 1.25 mg/3 mL nebulizer solution Inhale 1 ampule every 4 hours if needed for wheezing or shortness of breath.   11/8/2023    lisinopril 20 mg tablet Take 1 tablet (20 mg) by mouth once daily.   11/8/2023    montelukast (Singulair) 10 mg tablet Take 1 tablet (10 mg) by mouth once daily at bedtime.   11/8/2023    omalizumab (Xolair) 150 mg/mL injection Inject 1 mL (150 mg) under the skin every 28 (twenty-eight) days. October 15th last dose   Past Month    polyethylene glycol (Glycolax, Miralax) 17 gram packet Take 17 g by mouth once daily.       predniSONE (Deltasone) 5 mg tablet Take 1 tablet (5 mg) by mouth once daily as needed (asthma flares).       triamcinolone (Kenalog) 0.1 % ointment Apply 1 Application topically 2 times a day as needed for rash.          Review of Systems:No nausea,  no incontinence, no headache, moderate fatigue.     Neurological Exam  MENTAL STATUS:  Orientation: AxOx3  Language: Expression, repetition, comprehension intact  Follows complex commands across midline  Thought processes: Logical, organized  Concentration: Intact  Fund of knowledge: Appropriate  Judgment and Insight: Intact    CRANIAL NERVES:  - Fundoscopic exam:      - II/III: PERRL  - II:  Visual fields intact to confrontation bilaterally tested individually and together  - III, IV, VI: EOM full to pursuit without nystagmus  - V: V1-V3 sensation intact bilaterally  - VII: Face muscles symmetric with smile and eye closure; reports previous inherent facial asymmetry with right side  - VIII: Intact to interview  - IX, X: Palate elevated symmetrically bilaterally, no hoarseness  - XI: 5/5 strength on shoulder shrugging intact  "on left, 3/5 on right.  - XII: Tongue midline without atrophy or fasciculation    MOTOR: Tone and bulk normal in all extremities. No tremor, no abnormal movements.    STRENGTH: R L  Deltoid  1 5  Biceps  0 5  Triceps  0 5    0 5  Thumb Abd 0 5  Finger Abd 0 5    Hip flexion 2 5  Quadriceps 3 5  Hamstrings 3 5  DorsiFlex 1 5  PlantarFlex 1 5    REFLEXES: R L  Biceps  +2 +2  Triceps  +2 +2  BR  +2 +2  Patellar  +0 +3  Achilles +2 +2  Plantar  Up     Down    No Davis  No crossed adductors  No clonus    COORDINATION: Intact on finger to nose bl  SENSORY: Intact to light touch, vibration on left U/L extremities, distally unable to perceive light touch on right fingertips, right toes.     Physical Exam  Last Recorded Vitals  Blood pressure 129/76, pulse 72, temperature 36.2 °C (97.2 °F), temperature source Temporal, resp. rate 18, height 1.651 m (5' 5\"), weight 118 kg (260 lb 2.3 oz), SpO2 95 %.    Relevant Results        NIH Stroke Scale  1B. Ask Month and Age: Both Questions Right  1C. Blink Eyes & Squeeze Hands: Performs Both Tasks  2. Best Gaze: Normal  3. Visual: No Visual Loss  4. Facial Palsy: Normal Symmetrical Movements  5A. Motor - Left Arm: No Drift  5B. Motor - Right Arm: No Drift  6A. Motor - Left Leg: No Drift  6B. Motor - Right Leg: No Drift  7. Limb Ataxia: Absent  8. Sensory Loss: Mild-to-Moderate Sensory Loss  9. Best Language: No Aphasia  10. Dysarthria: Normal  11. Extinction and Inattention: No Abnormality           [place] Coma Scale  Best Eye Response: Spontaneous  Best Verbal Response: Oriented  Best Motor Response: Follows commands  [place] Coma Scale Score: 15                 I have personally reviewed the following imaging results US PELVIS TRANSABDOMINAL WITH TRANSVAGINAL    Result Date: 11/14/2023  Interpreted By:  Femi Lyons,  and Arcadio Campbell STUDY: US PELVIS TRANSABDOMINAL WITH TRANSVAGINAL;  11/14/2023 1:00 am   INDICATION: Signs/Symptoms:adnexal lesion seen on CT CAP.   " COMPARISON: 11/09/2023 CT chest abdomen pelvis   ACCESSION NUMBER(S): [id number]   ORDERING CLINICIAN: LINDEN SHAH   TECHNIQUE: Multiple multiplanar static gray scale, color and spectral waveform sonographic images of the pelvis were obtained.  Transabdominal ultrasound was performed.   FINDINGS: Exam is limited by bowel gas and poor positioning due to limited patient mobility.   UTERUS: The uterus is surgically absent.     RIGHT ADNEXA: The right ovary measures 2.6 x 1.7 x 1.7 cm and demonstrates normal flow. No gross right adnexal masses are seen, no hydrosalpinx.   LEFT ADNEXA: The left ovary is not well visualized.   CUL DE SAC: Trace free pelvic fluid is present.       1. Limited exam with nonvisualization of the left ovary. Cystic adnexal structures seen on 11/09/2023 CT abdomen pelvis are not well appreciated on this exam. 2. Status post hysterectomy.   I personally reviewed the images/study and I agree with the resident Adrian Ervin's findings as stated. This study was interpreted at [place], [place].   MACRO: None   Signed by: Femi Lyons 11/14/2023 5:36 AM Dictation workstation:   [id number]    MR cervical spine wo IV contrast    Result Date: 11/13/2023  Interpreted By:  Mauricio Lilly, STUDY: MR CERVICAL SPINE WO IV CONTRAST; ;  11/13/2023 9:30 am   INDICATION: Signs/Symptoms:new weakness after OR not explained on MRI Brain.   COMPARISON: None.   ACCESSION NUMBER(S): [id number]   ORDERING CLINICIAN: EL CASPER   TECHNIQUE: MRI of the cervical spine was performed with the acquisition of sagittal T2, sagittal T1, sagittal STIR, axial T1, and axial T2 weighted sequences.   FINDINGS: Evaluation is somewhat degraded due to patient motion.   There is straightening of the cervical spine. There is no striking spondylolisthesis. Vertebral body heights are maintained. There is mild intervertebral disc height narrowing at C4-C5, C5-C6, and C6-C7  with chronic degenerative endplate changes including osteophyte formation.   The cervical spinal cord is normal in signal and caliber.   At C2-C3, there is no posterior disc contour abnormality. There is no spinal canal stenosis neural foraminal narrowing. There is mild left facet osteoarthropathy.   At C3-C4, there is a disc osteophyte complex which partially effaces the ventral thecal sac. There is no spinal canal stenosis. There is mild bilateral neural foraminal narrowing due to uncovertebral hypertrophy. There is no striking facet osteoarthropathy.   At C4-C5, there is a disc osteophyte complex which partially effaces the ventral thecal sac. There is no spinal canal stenosis. There is mild bilateral neural foraminal narrowing due to uncovertebral hypertrophy and there is no facet osteoarthropathy.   At C5-C6, there is a disc osteophyte complex which partially effaces the ventral thecal sac. There is no spinal canal stenosis. There is mild bilateral neural foraminal narrowing due to uncovertebral hypertrophy. There is a small right perineural cyst. There is no facet osteoarthropathy.   At C6-C7, there is a disc osteophyte complex which partially effaces the ventral thecal sac. There is no spinal canal stenosis. There is no striking neural foraminal narrowing or facet osteoarthropathy.   At C7-T1, there is no posterior disc contour abnormality. There is no spinal canal stenosis or neural foraminal narrowing. There is no facet osteoarthropathy.       1. Unremarkable appearance of the cervical spinal cord.   2. Multilevel degenerative changes of the cervical spine without striking spinal canal stenosis at any level.   This study was interpreted at [facility].     MACRO: None   Signed by: Mauricio Lilly 11/13/2023 9:41 AM Dictation workstation:   [id number]    EEG    IMPRESSION Impression This vEEG is indicative of a left parietal structural lesion and a mild diffuse  encephalopathy. No epileptiform discharges are recorded. This report has been interpreted and electronically signed by    MR brain w and wo IV contrast    Result Date: 11/11/2023  Interpreted By:  Slade Crystal, STUDY: MR ANGIO HEAD WO IV CONTRAST; MR BRAIN W AND WO IV CONTRAST; MR ANGIO NECK WO IV CONTRAST;  11/11/2023 11:20 am   INDICATION: Signs/Symptoms:weakness; Signs/Symptoms:postop hemiplegia.   COMPARISON: 11/10/2023 head CT.   ACCESSION NUMBER(S): [id number]; [id number]; [id number]   ORDERING CLINICIAN: SHAN BORJA   TECHNIQUE: Multiplanar multisequence MRI of the head with and without gadolinium based contrast Time-of-flight MRA of the [id number]-[id number] and midportion of the neck was performed. The images were reviewed as source images and maximum intensity projections.   FINDINGS: Findings head MRI: Subjacent to the region of left parietal craniotomy there are foci restricted diffusion extending into the subcortical white matter of the posterior left frontal and left parietal lobe involving the left pre and postcentral gyri compatible ischemia in the region of FLAIR hyperintensity. No evidence of hemorrhagic transformation. Prominent leptomeningeal enhancement some of it is linear in the adjoining sulci may be due to venous vascular engorgement although minimal infectious inflammatory change could have a similar appearance. There is herniation a portion of the left parietal lobe through the craniectomy defect noted for instance on image 18 of series 14. No hydrocephalus. Distal right vertebral artery is hypoplastic. There is tortuosity of the distal left vertebral artery which indents the left lateral medulla. Major intracranial flow voids are otherwise preserved. Minimal to moderate pneumocephalus noted. Visualized paranasal sinuses and mastoid air cells are clear except for minimal mucoperiosteal thickening in the maxillary antra.   Findings MRA [id number]-[id number]: Distal internal  carotid arteries are patent and branch appropriately to form the anterior and middle cerebral arteries which are proximally patent with proximal partial azygous continuation of the A2 anterior cerebral arteries. Flow related enhancement noted within the distal left vertebral artery which forms the basilar artery gives rise to the right posterior cerebral artery which is proximally patent. The left posterior cerebral artery arises via a prominent left posterior communicating artery. Flow not well visualized within distal right vertebral artery which appears to be hypoplastic.   Findings MRA midportion neck: Visualized distal common carotid arteries carotid bifurcations and cervical internal carotid arteries are patent. The vertebral arteries are patent in the region of the neck with antegrade flow with the left side being dominant.       Small foci of restricted diffusion involving the left pre and postcentral gyrus subjacent to region of left parietal craniotomy in an area of parenchymal FLAIR hyperintensity suggesting ischemia in this region perhaps on a venous basis. There is herniation of a portion of the left parietal lobe into the craniectomy defect. Please correlate clinically.   MACRO: Critical Finding:  See findings. Notification was initiated on 11/11/2023 at 8:28 pm by  Slade Crystal.  (**-OCF-**) Instructions:   Signed by: Slade Crystal 11/11/2023 8:28 PM Dictation workstation:   [id number]    MR angio head wo IV contrast    Result Date: 11/11/2023  Interpreted By:  Slade Crystal, STUDY: MR ANGIO HEAD WO IV CONTRAST; MR BRAIN W AND WO IV CONTRAST; MR ANGIO NECK WO IV CONTRAST;  11/11/2023 11:20 am   INDICATION: Signs/Symptoms:weakness; Signs/Symptoms:postop hemiplegia.   COMPARISON: 11/10/2023 head CT.   ACCESSION NUMBER(S): [id number]; [id number]; [id number]   ORDERING CLINICIAN: SHAN BORJA   TECHNIQUE: Multiplanar multisequence MRI of the head with and without gadolinium based contrast  Time-of-flight MRA of the [id number]-tm-Fptmok and midportion of the neck was performed. The images were reviewed as source images and maximum intensity projections.   FINDINGS: Findings head MRI: Subjacent to the region of left parietal craniotomy there are foci restricted diffusion extending into the subcortical white matter of the posterior left frontal and left parietal lobe involving the left pre and postcentral gyri compatible ischemia in the region of FLAIR hyperintensity. No evidence of hemorrhagic transformation. Prominent leptomeningeal enhancement some of it is linear in the adjoining sulci may be due to venous vascular engorgement although minimal infectious inflammatory change could have a similar appearance. There is herniation a portion of the left parietal lobe through the craniectomy defect noted for instance on image 18 of series 14. No hydrocephalus. Distal right vertebral artery is hypoplastic. There is tortuosity of the distal left vertebral artery which indents the left lateral medulla. Major intracranial flow voids are otherwise preserved. Minimal to moderate pneumocephalus noted. Visualized paranasal sinuses and mastoid air cells are clear except for minimal mucoperiosteal thickening in the maxillary antra.   Findings MRA [id number]-wp-[id number]: Distal internal carotid arteries are patent and branch appropriately to form the anterior and middle cerebral arteries which are proximally patent with proximal partial azygous continuation of the A2 anterior cerebral arteries. Flow related enhancement noted within the distal left vertebral artery which forms the basilar artery gives rise to the right posterior cerebral artery which is proximally patent. The left posterior cerebral artery arises via a prominent left posterior communicating artery. Flow not well visualized within distal right vertebral artery which appears to be hypoplastic.   Findings MRA midportion neck: Visualized distal common carotid  arteries carotid bifurcations and cervical internal carotid arteries are patent. The vertebral arteries are patent in the region of the neck with antegrade flow with the left side being dominant.       Small foci of restricted diffusion involving the left pre and postcentral gyrus subjacent to region of left parietal craniotomy in an area of parenchymal FLAIR hyperintensity suggesting ischemia in this region perhaps on a venous basis. There is herniation of a portion of the left parietal lobe into the craniectomy defect. Please correlate clinically.   MACRO: Critical Finding:  See findings. Notification was initiated on 11/11/2023 at 8:28 pm by  Slade Crystal.  (**-OCF-**) Instructions:   Signed by: Slade Crystal 11/11/2023 8:28 PM Dictation workstation:   [id number]    MR angio neck wo IV contrast    Result Date: 11/11/2023  Interpreted By:  Slade Crystal, STUDY: MR ANGIO HEAD WO IV CONTRAST; MR BRAIN W AND WO IV CONTRAST; MR ANGIO NECK WO IV CONTRAST;  11/11/2023 11:20 am   INDICATION: Signs/Symptoms:weakness; Signs/Symptoms:postop hemiplegia.   COMPARISON: 11/10/2023 head CT.   ACCESSION NUMBER(S): [id number]; [id number]; [id number]   ORDERING CLINICIAN: SHAN BORJA   TECHNIQUE: Multiplanar multisequence MRI of the head with and without gadolinium based contrast Time-of-flight MRA of the nvlxph-tm-Zoixmg and midportion of the neck was performed. The images were reviewed as source images and maximum intensity projections.   FINDINGS: Findings head MRI: Subjacent to the region of left parietal craniotomy there are foci restricted diffusion extending into the subcortical white matter of the posterior left frontal and left parietal lobe involving the left pre and postcentral gyri compatible ischemia in the region of FLAIR hyperintensity. No evidence of hemorrhagic transformation. Prominent leptomeningeal enhancement some of it is linear in the adjoining sulci may be due to venous vascular engorgement  although minimal infectious inflammatory change could have a similar appearance. There is herniation a portion of the left parietal lobe through the craniectomy defect noted for instance on image 18 of series 14. No hydrocephalus. Distal right vertebral artery is hypoplastic. There is tortuosity of the distal left vertebral artery which indents the left lateral medulla. Major intracranial flow voids are otherwise preserved. Minimal to moderate pneumocephalus noted. Visualized paranasal sinuses and mastoid air cells are clear except for minimal mucoperiosteal thickening in the maxillary antra.   Findings MRA [id number]-[id number]-[id number]: Distal internal carotid arteries are patent and branch appropriately to form the anterior and middle cerebral arteries which are proximally patent with proximal partial azygous continuation of the A2 anterior cerebral arteries. Flow related enhancement noted within the distal left vertebral artery which forms the basilar artery gives rise to the right posterior cerebral artery which is proximally patent. The left posterior cerebral artery arises via a prominent left posterior communicating artery. Flow not well visualized within distal right vertebral artery which appears to be hypoplastic.   Findings MRA midportion neck: Visualized distal common carotid arteries carotid bifurcations and cervical internal carotid arteries are patent. The vertebral arteries are patent in the region of the neck with antegrade flow with the left side being dominant.       Small foci of restricted diffusion involving the left pre and postcentral gyrus subjacent to region of left parietal craniotomy in an area of parenchymal FLAIR hyperintensity suggesting ischemia in this region perhaps on a venous basis. There is herniation of a portion of the left parietal lobe into the craniectomy defect. Please correlate clinically.   MACRO: Critical Finding:  See findings. Notification was initiated on 11/11/2023 at 8:28 pm by   Slade Blitz.  (**-OCF-**) Instructions:   Signed by: Slade Crystal 11/11/2023 8:28 PM Dictation workstation:   [id number]    EEG    IMPRESSION Impression This vEEG is indicative of a left parietal structural lesion with overlying skull defect, as well as a mild diffuse encephalopathy. No epileptiform discharges are recorded. A full report will be scanned into the patient's chart at a later time. This report has been interpreted and electronically signed by    CT head wo IV contrast    Result Date: 11/10/2023  Interpreted By:  Matt Jama and Calo Sean-Matthew STUDY: CT HEAD WO IV CONTRAST;  11/10/2023 3:57 pm   INDICATION: Signs/Symptoms:s/p crani.   COMPARISON: CT head 11/07/2023   ACCESSION NUMBER(S): [id number]   ORDERING CLINICIAN: EL CASPER   TECHNIQUE: Noncontrast axial CT scan of head was performed. Angled reformats in brain and bone windows were generated. The images were reviewed in bone, brain, blood and soft tissue windows.   FINDINGS: Postsurgical changes from left parietal craniectomy with mesh cranioplasty for resection of a left parietal convexity mass. There are skin staples, soft tissue swelling, and emphysema with subgaleal drain in place superficial to the cranioplasty. Deep to the craniotomy site, there is mixed density extra-axial fluid and hemorrhage components measuring up to 8 mm in maximum thickness. A small amount of adjacent brain parenchyma also likely extends into the craniectomy defect abutting the cranioplasty. Air and fluid also tracks along the along the falx adjacent to the left parasagittal parietal and frontal lobes. There is moderate pneumocephalus predominantly along the frontal lobes measuring up to 8 mm bilaterally. No midline shift.   There is similar hypodensity involving the left frontoparietal region superiorly that may reflect residual edema or gliosis. There is no interval CT apparent acute infarct. No hydrocephalus. Basilar cisterns are patent.   There is  scattered mucosal thickening of the bilateral ethmoid air cells and left maxillary sinus. The remainder of the visualized paranasal sinuses and mastoid air cells are clear.       Expected postsurgical changes from left parietal craniectomy/mesh cranioplasty for left convexity mass debulking/resection as described above. Residual vasogenic edema versus gliosis within the adjacent left frontoparietal region.   I personally reviewed the images/study and I agree with the findings as stated by Watson Alexander MD. This study was interpreted at [place], [place].   MACRO: None   Signed by: Matt Jama 11/10/2023 5:02 PM Dictation workstation:   [id number]    CT chest abdomen pelvis w IV contrast    Result Date: 11/10/2023  Interpreted By:  Indra Lou,  and Phong Babcock STUDY: CT CHEST ABDOMEN PELVIS W IV CONTRAST;  11/9/2023 8:15 pm   INDICATION: Signs/Symptoms:Left parietal mass; Malignancy workup.   Per EMR: 48 y.o. female presenting with a PMH of severe persistent asthma, HTN, cervical cancer (diagnosed 2015 status post radical hysterectomy with no need for chemo or radiation), melanoma (left knee, status post resection), precancerous lesions of her back status post resection in 2015) who initially presented at OSH from home with multiple episodes of right-sided numbness and weakness which started on 11/05.   COMPARISON: None.   ACCESSION NUMBER(S): [id number]   ORDERING CLINICIAN: DAVID BORJA   TECHNIQUE: CT of the chest, abdomen, and pelvis was performed.  Contiguous axial images were obtained at 3 mm slice thickness through the chest, abdomen and pelvis. Coronal and sagittal reconstructions at 3 mm slice thickness were performed. 90 ml of contrast Omnipaque 350 were administered intravenously without immediate complication.   FINDINGS: CHEST:   LUNG/PLEURA/LARGE AIRWAYS: No lung masses, pulmonary nodules or consolidations are present. There is no  pleural effusion or pneumothorax.   VESSELS: Aorta and pulmonary arteries are normal caliber.  No atherosclerotic changes of the aorta are identified.  No coronary artery calcifications are present.   HEART: The heart is normal in size.  There is nopericardial effusion.   MEDIASTINUM AND PATRICK: Few prominent but nonenlarged mediastinal and bilateral axillary lymph nodes nonspecific. No thoracic lymphadenopathy. The esophagus is normal.   CHEST WALL AND LOWER NECK: The soft tissues of the chest wall demonstrate no gross abnormality. The visualized thyroid gland appears within normal limits.   ABDOMEN:   LIVER: Hepatomegaly measuring up to 24.0 cm in craniocaudal dimension. Geographic area of diminished attenuation along the medial aspect of liver segment 4 B likely represents an area of focal fatty infiltrate.   BILE DUCTS: The intrahepatic and extrahepatic ducts are not dilated.   GALLBLADDER: The gallbladder is nondistended and without evidence of radiopaque stones.   PANCREAS: The pancreas appears unremarkable without evidence of ductal dilatation or masses.   SPLEEN: The spleen is normal in size.   ADRENAL GLANDS: Bilateral adrenal glands appear normal.   KIDNEYS AND URETERS: The kidneys are normal in size and enhance symmetrically.  No hydroureteronephrosis or nephroureterolithiasis is identified.   PELVIS:   BLADDER: The urinary bladder appears normal without abnormal wall thickening.   REPRODUCTIVE ORGANS: Status post hysterectomy. A 5.0 x 2.5 cm low attenuating right ovarian mass with fluid density. A 2.3 cm low attenuating left ovarian mass with fluid density.   BOWEL: The stomach is unremarkable.  The small and large bowel are normal in caliber and demonstrate no wall thickening.  The appendix appears normal.     VESSELS: Mild atherosclerosis calcification of the aorta and its branching vessels. There is no aneurysmal dilatation of the abdominal aorta. The IVC appears normal    PERITONEUM/RETROPERITONEUM/LYMPH NODES: No ascites or free air, no fluid collection.  Surgical clips are noted along the bilateral pelvic sidewalls, likely relating to lymph node dissection. No retroperitoneal lymphadenopathy. Note is made of the mildly enlarged 1.0 cm right external iliac chain lymph node with a normal fatty yani..   BONE AND SOFT TISSUE: No acute fracture. There are few sclerotic lesions with Hounsfield units greater than 1000, for example 7 mm sclerotic lesion within the posterior T9 vertebral body, 9 mm sclerotic lesion within the left iliac bone and 5 mm sclerotic lesion within the inferior right ischium, favored to represent bone islands. Small fat containing umbilical and right-greater-than-left inguinal hernia.         1. No definite malignancy in the chest, abdomen and pelvis. Recommend PET-CT if there is concern for occult malignancy. 2. Bilateral ovarian cysts measuring up to 5 cm in size. Given the age of the patient, annual pelvic ultrasound follow-up is recommended. 3. Hepatomegaly, correlate with liver function tests.   I personally reviewed the images/study and I agree with the findings as stated by Resident Yoko Darling. This study was interpreted at [place], [place].   MACRO: None   Signed by: Indra Lou 11/10/2023 10:19 AM Dictation workstation:   [id number]    MR venography intracranial w and wo IV contrast    Result Date: 11/10/2023  Interpreted By:  Slade Crystal and Ebai Jerky STUDY: MR VENOGRAPHY INTRACRANIAL W AND WO IV CONTRAST;  11/9/2023 9:42 pm   INDICATION: Signs/Symptoms:meningioma near sinus.  Stroke protocol.   COMPARISON: MRI brain 11/08/2023   ACCESSION NUMBER(S): [id number]   ORDERING CLINICIAN: EL CASPER   TECHNIQUE:   Time-of-flight MRV of the head was performed. The images were reviewed as source images and maximum intensity projections.   FINDINGS: MRI venogram:   Diminutive left transverse  sinus, likely congenital. The previously described extra-axial mass overlying the left parietal convexity abuts and may invade the left aspect of the superior sagittal sinus with minimal to moderate narrowing. Otherwise, the dural venous sinuses are patent. The vein of Mendoza is patent. The basal veins of Jeanna is patent on the left and not well visualized on the right and internal cerebral veins are patent.       1. The left parasagittal meningioma abuts and may invade the left aspect of the superior sagittal sinus with minimal to moderate luminal narrowing in this region.   I personally reviewed the images/study and I agree with the findings as stated by Resident Yoko Darling. This study was interpreted at [place], [place].   MACRO: None   Signed by: Slade Crystal 11/10/2023 8:03 AM Dictation workstation:   [id number]    XR chest 1 view    Result Date: 11/9/2023  Interpreted By:  Michael Campos, STUDY: XR CHEST 1 VIEW;  11/9/2023 8:24 am   INDICATION: History of melanoma, cervical cancer 2 episodes of transient left-sided weakness and numbness.   COMPARISON: Chest radiograph dated 11/8/2023   ACCESSION NUMBER(S): [id number]   ORDERING CLINICIAN: EL CASPER   FINDINGS: AP radiograph of the chest     CARDIOMEDIASTINAL SILHOUETTE: The cardiomediastinal silhouette is stable in size and configuration. Aortic knob calcifications are seen.   LUNGS: There is no pulmonary edema. No focal consolidation or sizeable pleural effusion is identified. No pneumothorax is seen.   ABDOMEN: No remarkable upper abdominal findings.   BONES: No acute osseous abnormality.       1. No evidence of acute cardiopulmonary process.       Signed by: Michael Campos 11/9/2023 12:58 PM Dictation workstation:   [id number]    MR brain w and wo IV contrast    Result Date: 11/8/2023  Interpreted By:  Chance Gore and Bera Kaustav STUDY: MR BRAIN W AND WO IV CONTRAST;  11/8/2023 4:07 pm    INDICATION: Signs/Symptoms:New L parietal mass.   COMPARISON: CT head on 11/07/2023   ACCESSION NUMBER(S): [id number]   ORDERING CLINICIAN: HERBERTH NICOLAS   TECHNIQUE: Axial T2, FLAIR, DWI, gradient echo T2 and axial T1  weighted images of brain were acquired. Post contrast T1 weighted images including volumetric images with multiplanar reconstructions were acquired after administration of 23 mL of Dotarem, gadolinium based intravenous contrast.   FINDINGS: There is a 4.0 x 4.0 x 3.6 cm intensely enhancing extra-axial mass seen within the left parietal region at the, corresponding to the mass seen on recent CT. The mass is predominantly T2 isointense, T1 hypointense centrally, with peripheral T2 hyperintensity and T1 hypointensity. There is extensive diffusion restriction seen within the mass. There is susceptibility artifact seen along the periphery as well as superiorly in the mass, likely representing mineralization as seen on recent CT. The mass appears to erode the inner table of the skull extending to the diploe without eroding the outer table.   There is mass effect on the surrounding sulci, as well as mass effect on the right posterior interhemispheric fissure, causing mild right-sided bowing. There is surrounding T2 and FLAIR hyperintensity, likely due to vasogenic edema.   There is additional minimal scattered T2 and FLAIR white matter hyperintensities within the periventricular and subcortical bilateral cerebral hemispheres, which are nonspecific, however likely related to sequelae of chronic small vessel disease.   There is no intracranial hemorrhage. There is no midline shift. There is no diffusion restriction abnormality to suggest an acute infarct. There is no other area of abnormal parenchymal or leptomeningeal enhancement.   There are a few susceptibility artifact seen within the right greater than left anterior frontal lobe, which are likely artifactual.   The ventricles, sulci and basal cisterns  are patent.   There are mild right-greater-than-left mastoid effusions. Otherwise, visualized paranasal sinuses are clear. There is right-sided nasal septal deviation with a bony spur visualized.       1. 4 cm homogeneously enhancing and calcified extra-axial mass, corresponding to mass seen on recent CT, within the left parietal convexity, invading the inner table and extending to the diploic space of the skull is consistent with a meningioma. 2. Extension to and likely obstruction of the superior sagittal sinus best appreciated on coronal postcontrast enhanced image 23/93 and adjacent images. 3. Peripheral leptomeningeal enhancement suggesting extension to the leptomeninges and/or vascular supply from branches of the left middle cerebral artery. 4. Mild mass effect on the surrounding sulci and posterior interhemispheric fissure, with vasogenic edema. No midline shift. 5. No acute cortical infarct or intracranial hemorrhage.   I personally reviewed the images/study and I agree with the findings as stated. This study was interpreted at [place], [place].   MACRO: None   Signed by: Chance Gore 11/8/2023 5:03 PM Dictation workstation:   [id number]    CT head wo IV contrast    Result Date: 11/7/2023  Interpreted By:  Kaitlin Fofana, STUDY: CT HEAD WO IV CONTRAST;  11/7/2023 1:21 pm   INDICATION: TIA symptoms, right sided numbness.   COMPARISON: None.   ACCESSION NUMBER(S): [id number]   ORDERING CLINICIAN: RY WILLINGHAM   TECHNIQUE: Noncontrast axial CT scan of head was performed. Angled reformats in brain and bone windows were generated. The images were reviewed in bone, brain, blood and soft tissue windows. Sagittal and coronal reconstructions were acquired   FINDINGS:         BRAIN PARENCHYMA: There is a large mass in the left high parietal region which measures 4.1 x 3.4 cm. Large part of the mass is calcified. There is adjacent vasogenic edema in the left  parietal lobe, which extends to the left occipital lobe. The mass appears to be extra-axial though it is difficult to tell and may be related to a meningioma.   HEMORRHAGE: None.   VENTRICLES and EXTRA-AXIAL SPACES: Normal size.   INTRACRANIAL VESSELS: No atherosclerotic calcification.   EXTRACRANIAL SOFT TISSUES: Within normal limits.   PARANASAL SINUSES/MASTOIDS: Mild mucosal thickening is seen in the left maxillary sinus. CALVARIUM: No destructive lesion or fracture.       4.1 cm mass in the left parietal region with adjacent vasogenic edema. Further evaluation with an MRI of the brain is suggested.   MACRO: Kaitlin Fofana discussed the significance and urgency of this critical finding by telephone with  RY WILLINGHAM on 11/7/2023 at 1:54 pm. (**-RCF-**) Findings:  See findings.       Signed by: Kaitlin Fofana 11/7/2023 1:54 PM Dictation workstation:   [id number]  .    Assessment/Plan   Principal Problem:    Meningioma (CMS/HCC)  Active Problems:    HTN (hypertension)    Obesity    Severe asthma  Ariane Chavis is a 48 y.o. female presenting with right sided weakness and tingling of her right upper and lower extremity in the setting of meningioma tumor resection on 11/10/23.  Currently presenting with decreases sensation, and increased weakness.  Patient reports some incremental improvement in sensation and movement since her surgery.            Jaylene Shah MD

## 2023-11-15 PROCEDURE — 2500000004 HC RX 250 GENERAL PHARMACY W/ HCPCS (ALT 636 FOR OP/ED)

## 2023-11-15 PROCEDURE — 97535 SELF CARE MNGMENT TRAINING: CPT | Mod: GO

## 2023-11-15 PROCEDURE — 94640 AIRWAY INHALATION TREATMENT: CPT

## 2023-11-15 PROCEDURE — 94760 N-INVAS EAR/PLS OXIMETRY 1: CPT

## 2023-11-15 PROCEDURE — 1100000001 HC PRIVATE ROOM DAILY

## 2023-11-15 PROCEDURE — 2500000004 HC RX 250 GENERAL PHARMACY W/ HCPCS (ALT 636 FOR OP/ED): Performed by: NURSE PRACTITIONER

## 2023-11-15 PROCEDURE — 96372 THER/PROPH/DIAG INJ SC/IM: CPT

## 2023-11-15 PROCEDURE — 2500000002 HC RX 250 W HCPCS SELF ADMINISTERED DRUGS (ALT 637 FOR MEDICARE OP, ALT 636 FOR OP/ED)

## 2023-11-15 PROCEDURE — 97112 NEUROMUSCULAR REEDUCATION: CPT | Mod: GP | Performed by: PHYSICAL THERAPIST

## 2023-11-15 PROCEDURE — 2500000001 HC RX 250 WO HCPCS SELF ADMINISTERED DRUGS (ALT 637 FOR MEDICARE OP)

## 2023-11-15 PROCEDURE — 97530 THERAPEUTIC ACTIVITIES: CPT | Mod: GO

## 2023-11-15 PROCEDURE — 2500000004 HC RX 250 GENERAL PHARMACY W/ HCPCS (ALT 636 FOR OP/ED): Performed by: STUDENT IN AN ORGANIZED HEALTH CARE EDUCATION/TRAINING PROGRAM

## 2023-11-15 PROCEDURE — 95720 EEG PHY/QHP EA INCR W/VEEG: CPT | Performed by: PSYCHIATRY & NEUROLOGY

## 2023-11-15 PROCEDURE — 95715 VEEG EA 12-26HR INTMT MNTR: CPT

## 2023-11-15 PROCEDURE — 95700 EEG CONT REC W/VID EEG TECH: CPT

## 2023-11-15 PROCEDURE — 99233 SBSQ HOSP IP/OBS HIGH 50: CPT | Performed by: STUDENT IN AN ORGANIZED HEALTH CARE EDUCATION/TRAINING PROGRAM

## 2023-11-15 RX ORDER — DEXAMETHASONE 2 MG/1
2 TABLET ORAL EVERY 8 HOURS
Status: DISCONTINUED | OUTPATIENT
Start: 2023-11-15 | End: 2023-11-17 | Stop reason: HOSPADM

## 2023-11-15 RX ADMIN — HEPARIN SODIUM 7500 UNITS: 5000 INJECTION INTRAVENOUS; SUBCUTANEOUS at 14:05

## 2023-11-15 RX ADMIN — Medication 3 MG: at 20:17

## 2023-11-15 RX ADMIN — ALBUTEROL SULFATE 2.5 MG: 2.5 SOLUTION RESPIRATORY (INHALATION) at 20:17

## 2023-11-15 RX ADMIN — ACETAMINOPHEN 650 MG: 325 TABLET ORAL at 14:06

## 2023-11-15 RX ADMIN — MONTELUKAST SODIUM 10 MG: 10 TABLET, FILM COATED ORAL at 20:17

## 2023-11-15 RX ADMIN — ACETAMINOPHEN 650 MG: 325 TABLET ORAL at 05:27

## 2023-11-15 RX ADMIN — BUDESONIDE 0.5 MG: 0.5 INHALANT RESPIRATORY (INHALATION) at 09:02

## 2023-11-15 RX ADMIN — LEVETIRACETAM 500 MG: 5 INJECTION INTRAVENOUS at 19:24

## 2023-11-15 RX ADMIN — POLYETHYLENE GLYCOL 3350 17 G: 17 POWDER, FOR SOLUTION ORAL at 20:17

## 2023-11-15 RX ADMIN — HEPARIN SODIUM 7500 UNITS: 5000 INJECTION INTRAVENOUS; SUBCUTANEOUS at 05:27

## 2023-11-15 RX ADMIN — DEXAMETHASONE 2 MG: 2 TABLET ORAL at 09:19

## 2023-11-15 RX ADMIN — HEPARIN SODIUM 7500 UNITS: 5000 INJECTION INTRAVENOUS; SUBCUTANEOUS at 21:32

## 2023-11-15 RX ADMIN — BUDESONIDE 0.5 MG: 0.5 INHALANT RESPIRATORY (INHALATION) at 21:20

## 2023-11-15 RX ADMIN — ACETAMINOPHEN 650 MG: 325 TABLET ORAL at 20:17

## 2023-11-15 RX ADMIN — DEXAMETHASONE SODIUM PHOSPHATE 4 MG: 4 INJECTION, SOLUTION INTRAMUSCULAR; INTRAVENOUS at 02:10

## 2023-11-15 RX ADMIN — FLUTICASONE FUROATE AND VILANTEROL TRIFENATATE 1 PUFF: 100; 25 POWDER RESPIRATORY (INHALATION) at 09:06

## 2023-11-15 RX ADMIN — ALBUTEROL SULFATE 2.5 MG: 2.5 SOLUTION RESPIRATORY (INHALATION) at 09:02

## 2023-11-15 RX ADMIN — TIOTROPIUM BROMIDE INHALATION SPRAY 2 PUFF: 3.12 SPRAY, METERED RESPIRATORY (INHALATION) at 09:06

## 2023-11-15 RX ADMIN — SENNOSIDES AND DOCUSATE SODIUM 2 TABLET: 8.6; 5 TABLET ORAL at 20:17

## 2023-11-15 RX ADMIN — DEXAMETHASONE 2 MG: 2 TABLET ORAL at 19:21

## 2023-11-15 RX ADMIN — LEVETIRACETAM 500 MG: 5 INJECTION INTRAVENOUS at 04:30

## 2023-11-15 RX ADMIN — ALBUTEROL SULFATE 2.5 MG: 2.5 SOLUTION RESPIRATORY (INHALATION) at 16:13

## 2023-11-15 ASSESSMENT — PAIN - FUNCTIONAL ASSESSMENT
PAIN_FUNCTIONAL_ASSESSMENT: 0-10

## 2023-11-15 ASSESSMENT — COGNITIVE AND FUNCTIONAL STATUS - GENERAL
CLIMB 3 TO 5 STEPS WITH RAILING: TOTAL
MOVING TO AND FROM BED TO CHAIR: TOTAL
TURNING FROM BACK TO SIDE WHILE IN FLAT BAD: A LOT
TURNING FROM BACK TO SIDE WHILE IN FLAT BAD: A LOT
WALKING IN HOSPITAL ROOM: TOTAL
MOBILITY SCORE: 8
MOVING TO AND FROM BED TO CHAIR: TOTAL
MOVING FROM LYING ON BACK TO SITTING ON SIDE OF FLAT BED WITH BEDRAILS: A LOT
MOBILITY SCORE: 8
WALKING IN HOSPITAL ROOM: TOTAL
STANDING UP FROM CHAIR USING ARMS: TOTAL
MOVING FROM LYING ON BACK TO SITTING ON SIDE OF FLAT BED WITH BEDRAILS: A LOT
STANDING UP FROM CHAIR USING ARMS: TOTAL
CLIMB 3 TO 5 STEPS WITH RAILING: TOTAL

## 2023-11-15 ASSESSMENT — PAIN SCALES - GENERAL
PAINLEVEL_OUTOF10: 0 - NO PAIN
PAINLEVEL_OUTOF10: 3
PAINLEVEL_OUTOF10: 6
PAINLEVEL_OUTOF10: 0 - NO PAIN
PAINLEVEL_OUTOF10: 6

## 2023-11-15 ASSESSMENT — PAIN DESCRIPTION - ORIENTATION: ORIENTATION: RIGHT

## 2023-11-15 ASSESSMENT — PAIN DESCRIPTION - LOCATION: LOCATION: ANKLE

## 2023-11-15 ASSESSMENT — ACTIVITIES OF DAILY LIVING (ADL): HOME_MANAGEMENT_TIME_ENTRY: 15

## 2023-11-15 NOTE — PROGRESS NOTES
"Ariane Chavis is a 48 y.o. female on day 6 of admission presenting with Meningioma (CMS/HCC).    Subjective   NAEON         Objective     Physical Exam  Awake, Ox3   Face symmetric  L 5/5  RLE prox 2/5 otherwise, R flaccid  Incision c/d/I    Last Recorded Vitals  Blood pressure 119/74, pulse 77, temperature 36 °C (96.8 °F), temperature source Temporal, resp. rate 18, height 1.651 m (5' 5\"), weight 118 kg (260 lb 2.3 oz), SpO2 94 %.  Intake/Output last 3 Shifts:  I/O last 3 completed shifts:  In: 100 (0.8 mL/kg) [IV Piggyback:100]  Out: 1350 (11.4 mL/kg) [Urine:1350 (0.3 mL/kg/hr)]  Weight: 118 kg     Relevant Results                             Assessment/Plan   Principal Problem:    Meningioma (CMS/HCC)  Active Problems:    HTN (hypertension)    Obesity    Severe asthma    Ariane Chavis is a 48 y.o. female w h/o HTN, melanoma (knee, removed), cervical cancer (in remission), presenting with 2 episodes of transient L side weakness and numbness. Pt states she was suddenly unable to move her leg, and her arm felt weak, and this resolved shortly afterwards. Pt denies any headaches, nausea/vomiting, or any other symptoms. Patient currently feels back to baseline. CTH - left parietal extra-axial calcified mass  MRI brain - left parietal dural-based lesion with surrounding vasogenic edema, c/w meningioma, without brain compression or midline shift     11/9 MRV- meningioma abuts the posterior superior transverse sinus with mild narrowing, no evidence of dural venous thrombosis   CTAP - 5 cm R adnexal mass  11/10 s/p L crani for tumor resection; post op neuro exam change, CTH post op changes  11/11 MRI GTR, punctate diffusion hits but no sig stroke  11/14 pelvic ultrasound unable to visualize adnexal structures, L ovary     PLAN:    Floor status  Follow up on EEG  Continue Keppra 500 BID  Pain control  PTOT- rehab             Caitlin Pena MD      "

## 2023-11-15 NOTE — PROGRESS NOTES
Physical Therapy    Physical Therapy Treatment    Patient Name: Ariane Chavis  MRN: [id number]  Today's Date: 11/15/2023  Time Calculation  Start Time: 1623  Stop Time: 1658  Time Calculation (min): 35 min       Assessment/Plan   PT Assessment  PT Assessment Results: Decreased strength, Decreased endurance, Impaired balance, Impaired tone, Decreased mobility, Decreased coordination, Decreased cognition, Impaired vision, Impaired sensation, Obesity, Decreased skin integrity  Rehab Prognosis: Excellent  Evaluation/Treatment Tolerance: Patient tolerated treatment well  Medical Staff Made Aware: Yes (RN (Present assisting))  Strengths: Ability to acquire knowledge, Attitude of self, Coping skills, Support of Caregivers, Premorbid level of function  Barriers to Participation:  (none)  End of Session Communication: Bedside nurse, PCT/NA/CTA  Assessment Comment: Pt. is a 49 y/o female with Left parietal meningioma s/p crani/resection with Right UE/LE hypertonus, decreased isolated movement, impaired balance, and intermittent tearfulness; improved mobility, sitting midline and overall alertness today. Recommend high intensity therapy as pt needs all 3 therapies, appears very motivated, has great support, is not at [facility], is making gains and has complex rehab and equipment needs.  End of Session Patient Position: Bed, 3 rail up, Alarm on (in Right sidelying)  PT Plan  Inpatient/Swing Bed or Outpatient: Inpatient  PT Plan  Treatment/Interventions: Bed mobility, Transfer training, Gait training, Balance training, Neuromuscular re-education, Endurance training, Strengthening, Therapeutic activity, Positioning, Postural re-education  PT Plan: Skilled PT  PT Frequency: 5 times per week  PT Discharge Recommendations: High intensity level of continued care  Equipment Recommended upon Discharge:  (TBD)  PT Recommended Transfer Status:  (not yet safe to transfer)  PT - OK to Discharge: Yes (PT eval complete and DC recs  made)      General Visit Information:   PT  Visit  PT Received On: 11/15/23  Response to Previous Treatment: Patient with no complaints from previous session.  General  Reason for Referral: Admitted 11/10 presenting with 2 episodes of transient L side weakness and numbness; dx: Left parietal menincioma, s/p L crani for tumor resection  Past Medical History Relevant to Rehab: PMHx: HTN, melanoma (knee, removed), cervical cancer (in remission), asthma,  Missed Visit: No  Family/Caregiver Present: Yes  Caregiver Feedback:  (DIL present and engaged)  Prior to Session Communication: Bedside nurse  Patient Position Received: Bed, 3 rail up, Alarm on (on her back)  Preferred Learning Style: verbal  General Comment: Pt supine alert and engaged, occasionally tearful but much less so than last visit. She reports feeling good getting up to sit and stand. Reports having Right ankle pain earlier in the day    Subjective   Precautions:  Precautions  Hearing/Visual Limitations: has mild inattention or field cut Right side (TBD)  Medical Precautions: Fall precautions  Post-Surgical Precautions:  (crani, Right paresis, hypertonicity Right UE/LE extensors)  Vital Signs:       Objective   Pain:  Pain Assessment  Pain Assessment: 0-10  Pain Score: 0 - No pain  Pain Interventions: Repositioned  Response to Interventions: pt comfortable with no pain at end of sessoin  Cognition:  Cognition  Overall Cognitive Status: Within Functional Limits  Orientation Level: Oriented X4  Problem Solving: Exceptions to WFL  Complex Functional Tasks: Impaired  Postural Control:  Postural Control  Postural Control: Impaired  Trunk Control: min/mod A static sit  Righting Reactions: delayed bilaterally (R > L)  Protective Responses: no present on right, delayed on left  Posture Comment: rounded shoulders, forward head, posterior pelvic tilt, mild/mod lean to Left but able to correct  Extremity/Trunk Assessments:  RUE   RUE : Exceptions to WFL  RUE  Strength  RUE Overall Strength:  (Right shoulder horizontal adduction 2-, shrug 2-, shoulder internal rotation 2-; elbow extension 1+)  RUE Tone  RUE Tone: Hypertonic (elbow extensors, shoulder adductors)     RLE   RLE : Exceptions to WFL  PROM RLE (degrees)  RLE PROM Comment: DF with KE to neutral with overpressure, knee/ hip flexion ~90 degrees  Strength RLE  RLE Overall Strength:  (Right hip adduction 2+, knee extension 1+ (overcome by hypertonus))  Tone RLE  RLE Tone: Hypertonic (knee extension primarily, at times ankle DF)     Activity Tolerance:  Activity Tolerance  Endurance:  (intermittent rest breaks between standing trials)  Treatments:  Balance/Neuromuscular Re-Education  Balance/Neuromuscular Re-Education Activity Performed: Yes  Balance/Neuromuscular Re-Education Activity 1: facilitation of rolling: to left with assist to flex knee and hand over hand placement of Right UE, to Right with cues and min tactile cues  Balance/Neuromuscular Re-Education Activity 2: midine sitting with focus in no trunk post rotation Left, relaxation of Left shoulder, no use of UEs intermittently  Balance/Neuromuscular Re-Education Activity 3: sit to/from stand forcing use of Right LE and erect posture as noted    Bed Mobility  Bed Mobility: Yes  Bed Mobility 1  Bed Mobility 1: Rolling right, Rolling left  Level of Assistance 1: Moderate tactile cues, Minimal verbal cues (to Left with mod/max A 1-2; to Right with mod A +1)  Bed Mobility Comments 1: use of rail/drawsheet  Bed Mobility 2  Bed Mobility  2: Side lying right to sit (and sit to Right sidelying)  Level of Assistance 2:  (mod/max A, min verbal cues, mod tactile cues to come to sit; mod/max to lay down with A of +1 other posteriorly for safety)  Bed Mobility Comments 2: HOB elevated 65 degrees, in and out on Right side, use of rail and drawsheet  Bed Mobility 3  Bed Mobility 3: Scooting  Level of Assistance 3: Maximum assistance (+2, pt assisting with Left  LE)    Ambulation/Gait Training  Ambulation/Gait Training Performed: No  Transfers  Transfer: Yes  Transfer 1  Transfer From 1: Sit to, Stand to  Transfer to 1: Sit, Stand  Technique 1: Sit to stand, Stand to sit  Transfer Device 1:  (no device; side by side arm in arm assist with knees blocked)  Transfer Level of Assistance 1: +2, Moderate tactile cues, Moderate verbal cues, Moderate assistance (mod/max A)  Trials/Comments 1: 4 trials, up for  seconds each    Outcome Measures:  [facility] Basic Mobility  Turning from your back to your side while in a flat bed without using bedrails: A lot  Moving from lying on your back to sitting on the side of a flat bed without using bedrails: A lot  Moving to and from bed to chair (including a wheelchair): Total  Standing up from a chair using your arms (e.g. wheelchair or bedside chair): Total  To walk in hospital room: Total  Climbing 3-5 steps with railing: Total  Basic Mobility - Total Score: 8    Education Documentation  Mobility Training, taught by Nelda Ayala, PT at 11/15/2023  5:14 PM.  Learner: Family, Patient  Readiness: Eager  Method: Explanation, Demonstration  Response: Verbalizes Understanding, Demonstrated Understanding, Needs Reinforcement  Comment: rolling, coming to/from sit and stand, midline sitting and standing    Education Comments  No comments found.        OP EDUCATION:       Encounter Problems       Encounter Problems (Active)       Balance       Pt. will require CGA for static/dynamic sitting balance to safely participate in ADLs  (Progressing)       Start:  11/13/23    Expected End:  12/04/23               Functional mobility       Pt will complete STS without a device with Min A  (Progressing)       Start:  11/15/23    Expected End:  12/06/23               Mobility       Pt. will ambulate > 5 ft. with mod A and RW  (Not Progressing)       Start:  11/13/23    Expected End:  12/04/23            Pt. will perform bed mobility with mod A x 1   (Progressing)       Start:  11/13/23    Expected End:  12/04/23               Pain - Adult          Transfers       Pt. will transfer from STS with mod A x 1 and RW  (Progressing)       Start:  11/13/23    Expected End:  12/04/23

## 2023-11-15 NOTE — PROGRESS NOTES
Occupational Therapy    OT Treatment    Patient Name: Ariane Chavis  MRN: [id number]  Today's Date: 11/15/2023             11/15/23 1109   OT Last Visit   OT Received On 11/15/23   General   Family/Caregiver Present Yes   Caregiver Feedback  present and engaged in therapy session   Prior to Session Communication Bedside nurse   Patient Position Received Bed, 3 rail up;Alarm off, not on at start of session   Preferred Learning Style verbal   General Comment Pt pleasant, motivated, and agreeable to therapy session.   Precautions   Medical Precautions Fall precautions   Pain Assessment   Pain Assessment 0-10   Pain Score 0 - No pain   Cognition   Overall Cognitive Status WFL   Orientation Level Oriented X4   Grooming   Grooming Level of Assistance Minimum assistance   Grooming Where Assessed Edge of bed   Grooming Comments Pt completed grooming while sitting EOB with CGA-Min A for seated balance. She is able to use L UE to wash face and aroind head/neck area.   UE Bathing   UE Bathing Level of Assistance Moderate assistance   UE Bathing Where Assessed Edge of bed   UE Bathing Comments Min A for seated balance, pt able to clean chest and R UE using L UE. She requires assist with back, L UE, and for overall thoroughness.   Bed Mobility   Bed Mobility Yes   Bed Mobility 1   Bed Mobility 1 Supine to sitting   Level of Assistance 1 Maximum assistance;Moderate verbal cues  (x2)   Bed Mobility Comments 1 Max A x 2. Assist for management of LEs and trunk moving into upright sitting. Assist for turning hips at EOB for improved posture.   Bed Mobility 2   Bed Mobility  2 Sitting to supine   Level of Assistance 2 Maximum assistance  (x2)   Bed Mobility Comments 2 Assist raising B LEs over EOB, assist with trunk support   Transfer 1   Transfer From 1 Sit to   Transfer to 1 Stand   Technique 1 Sit to stand   Transfer Level of Assistance 1 Maximum assistance;Moderate verbal cues;+2  (Completed 2 trials, Max A x2)  "  Therapeutic Activity   Therapeutic Activity Performed Yes   Therapeutic Activity 1 Pt completed 2 sit to stand trials at EOB with Max A x2 for lifting to achieve upright standing. Therapist used bed sheet around waist to guide hips into extension and blocked R side to prevent buckeling. Pt able to tolerate standing for approx 30 seconds before requiring a seated rest break. She completed 2 trials with similar assistance required. Pt becomes emotional stating \"it feels so good to stand.\" Pt remains motivated throughout activity.   IP OT Assessment   OT Assessment Progressing towards d/c goals, pt with increased activity tolerance and ability to engage in functional activities sitting EOB. Continue POC.   Medical Staff Made Aware Yes   End of Session Communication PCT/NA/CTA   End of Session Patient Position Bed, 3 rail up;Alarm on   OT Assessment   OT Assessment Results Decreased ADL status;Decreased upper extremity range of motion;Decreased upper extremity strength;Decreased endurance;Decreased IADLs   Inpatient Plan   Treatment Interventions ADL retraining;Functional transfer training;UE strengthening/ROM;Endurance training;Neuromuscular reeducation;Equipment evaluation/education;Patient/family training;Fine motor coordination activities;Compensatory technique education   OT Frequency 2 times per week   OT Discharge Recommendations High intensity level of continued care   OT - OK to Discharge Yes       Assessment:  OT Assessment: Progressing towards d/c goals, pt with increased activity tolerance and ability to engage in functional activities sitting EOB. Continue POC.  Medical Staff Made Aware: Yes  End of Session Communication: PCT/NA/CTA  End of Session Patient Position: Bed, 3 rail up, Alarm on  OT Assessment Results: Decreased ADL status, Decreased upper extremity range of motion, Decreased upper extremity strength, Decreased endurance, Decreased IADLs  Medical Staff Made Aware: Yes    Plan:  Treatment " Interventions: ADL retraining, Functional transfer training, UE strengthening/ROM, Endurance training, Neuromuscular reeducation, Equipment evaluation/education, Patient/family training, Fine motor coordination activities, Compensatory technique education  OT Frequency: 2 times per week  OT Discharge Recommendations: High intensity level of continued care  OT - OK to Discharge: Yes  Treatment Interventions: ADL retraining, Functional transfer training, UE strengthening/ROM, Endurance training, Neuromuscular reeducation, Equipment evaluation/education, Patient/family training, Fine motor coordination activities, Compensatory technique education  Subjective     Outcome Measures:   Education Documentation  Precautions, taught by Phil Sidhu OT at 11/15/2023 12:44 PM.  Learner: Family, Patient  Readiness: Acceptance  Method: Explanation, Demonstration  Response: Verbalizes Understanding, Demonstrated Understanding    ADL Training, taught by Phil Sidhu OT at 11/15/2023 12:44 PM.  Learner: Family, Patient  Readiness: Acceptance  Method: Explanation, Demonstration  Response: Verbalizes Understanding, Demonstrated Understanding    Education Comments  No comments found.      Goals:  Encounter Problems       Encounter Problems (Active)       ADLs       Pt will be mod assist x 1 vinicius/doffing pants at chair level (Progressing)       Start:  11/13/23    Expected End:  11/27/23               BALANCE       Pt will be SBA-CGA seated EOB while performing grooming ADLs (Progressing)       Start:  11/13/23    Expected End:  11/27/23                 Functional mobility       Pt will complete STS without a device with Min A        Start:  11/15/23    Expected End:  12/06/23                       TRANSFERS       Pt will be max assist x 1 seated EOB to BSC to complete toileting ADLs (Progressing)       Start:  11/13/23    Expected End:  11/27/23              Phil JIMENEZ/L

## 2023-11-15 NOTE — PROGRESS NOTES
Ariane Chavis is a 48 y.o. female on day 6 of admission presenting with Meningioma (CMS/HCC).    Subjective/Overnight Events:   NAEO. Patient says she felt spasms but on her right arm/shoulder but     Objective:       Physical Exam   Mental status: alert, oriented x4  Language: Expression and comprehension intact    Cranial nerves: pupils equal reactive, EOM full range smile symmetric  Motor: normal tone bilaterally; no abnormal movements    Strength:  RUE: 2/5 at shoulders, 0/5 distally  RLE: 2/5 at hip, 0/5 distally  L hemibody: 5/5 proximally and distally     Reflexes:  Biceps R2 L2  Triceps R2 L2  Patellar R3 L2  Plantar R up L down    No clonus    Sensory:  Diminished light touch in right hemibody, LE > UE  Diminished pinprick sensation in right hemibody, LE > UE  Vibration symmetric in fingers and toes, detection UE > LE  Temperature sensation equally intact in UE and LE, more sensation in right than left hemibody    Gait/Romberg: unable to assess d/t hemiplegia     Neuro:    24 Hour Vitals  Temp:  [35.3 °C (95.6 °F)-36.4 °C (97.5 °F)] 36 °C (96.8 °F)  Heart Rate:  [71-77] 77  Resp:  [18-19] 18  BP: (117-124)/(73-87) 119/74    24 hour Intake/Output  No intake or output data in the 24 hours ending 11/15/23 0830    Labs  Results from last 72 hours   Lab Units 11/14/23  0906   WBC AUTO x10*3/uL 11.8*   HEMOGLOBIN g/dL 12.1        Results from last 72 hours   Lab Units 11/14/23  0906   SODIUM mmol/L 137   POTASSIUM mmol/L 4.2   CHLORIDE mmol/L 101   BUN mg/dL 15   CREATININE mg/dL 0.33*   PHOSPHORUS mg/dL 3.2        Medications   Scheduled medications    Continuous medications     PRN medications  PRN medications: acetaminophen, hydrALAZINE, hydrALAZINE, HYDROmorphone, hydrOXYzine HCL, naloxone, ondansetron ODT **OR** ondansetron, oxyCODONE, oxyCODONE, oxygen    Assessment/Plan   Ariane Chavis is a 48 year old right-handed woman who presented with right hemibody weakness and tingling, found to have left  fronto-parietal meningioma s/p resection on 11/10. Neurology consulted for post-op right hemibody paresis. Neuro exam notably for left distal > proximal weakness.     11/11 MRI brain showed small area of diffusion restriction in left parietal region (does not follow specific vascular territory) and FLAIR hyperintensity in left fronto-parietal edema. EEG 11/11-12 showed left hemispheric structural lesion without epileptiform discharges. Patient reports some incremental improvement in sensation and movement since her surgery. Patient reports spasms of right hemibody, unclear if these spasms may be epileptic movements; however, she denies fluctuation of weakness or sensory changes so her current symptoms are less likely a Tomás's paralysis post-op. Current right-hemibody weakness and paresthesias are likely related to post-op changes and edema; her gradual improvement is likely related to continued steroid use and physical therapy.    11/14 EEG preliminary report: left hemispheric structural lesion and skull defect, final report pending    Recommendations:  -agree with continuing steroids for vasogenic edema  -agree with continuing Keppra 500 mg BID  -outpatient epilepsy clinic appointment 4-6 weeks after discharge to discuss outpatient EMU admission for ASM duration    Roxie Connors MD  PGY-3 Neurology  Please page with questions.  Neurology [id number]

## 2023-11-16 ENCOUNTER — APPOINTMENT (OUTPATIENT)
Dept: VASCULAR MEDICINE | Facility: HOSPITAL | Age: 48
DRG: 025 | End: 2023-11-16
Payer: COMMERCIAL

## 2023-11-16 VITALS
OXYGEN SATURATION: 97 % | DIASTOLIC BLOOD PRESSURE: 69 MMHG | HEIGHT: 65 IN | WEIGHT: 260.14 LBS | SYSTOLIC BLOOD PRESSURE: 110 MMHG | BODY MASS INDEX: 43.34 KG/M2 | HEART RATE: 77 BPM | RESPIRATION RATE: 20 BRPM | TEMPERATURE: 97.5 F

## 2023-11-16 PROBLEM — D32.9 MENINGIOMA (MULTI): Status: RESOLVED | Noted: 2023-11-09 | Resolved: 2023-11-16

## 2023-11-16 LAB
GLUCOSE BLD MANUAL STRIP-MCNC: 153 MG/DL (ref 74–99)
GLUCOSE BLD MANUAL STRIP-MCNC: 195 MG/DL (ref 74–99)
LABORATORY COMMENT REPORT: NORMAL
Lab: NORMAL
PATH REPORT.FINAL DX SPEC: NORMAL
PATH REPORT.GROSS SPEC: NORMAL
PATH REPORT.RELEVANT HX SPEC: NORMAL
PATH REPORT.TOTAL CANCER: NORMAL

## 2023-11-16 PROCEDURE — 97535 SELF CARE MNGMENT TRAINING: CPT | Mod: GO

## 2023-11-16 PROCEDURE — 96372 THER/PROPH/DIAG INJ SC/IM: CPT

## 2023-11-16 PROCEDURE — 93971 EXTREMITY STUDY: CPT | Performed by: SURGERY

## 2023-11-16 PROCEDURE — 94640 AIRWAY INHALATION TREATMENT: CPT

## 2023-11-16 PROCEDURE — 2500000001 HC RX 250 WO HCPCS SELF ADMINISTERED DRUGS (ALT 637 FOR MEDICARE OP)

## 2023-11-16 PROCEDURE — 97112 NEUROMUSCULAR REEDUCATION: CPT | Mod: GO

## 2023-11-16 PROCEDURE — 2500000004 HC RX 250 GENERAL PHARMACY W/ HCPCS (ALT 636 FOR OP/ED): Performed by: NURSE PRACTITIONER

## 2023-11-16 PROCEDURE — 2500000004 HC RX 250 GENERAL PHARMACY W/ HCPCS (ALT 636 FOR OP/ED)

## 2023-11-16 PROCEDURE — 97530 THERAPEUTIC ACTIVITIES: CPT | Mod: GO

## 2023-11-16 PROCEDURE — 2500000002 HC RX 250 W HCPCS SELF ADMINISTERED DRUGS (ALT 637 FOR MEDICARE OP, ALT 636 FOR OP/ED)

## 2023-11-16 PROCEDURE — 94760 N-INVAS EAR/PLS OXIMETRY 1: CPT

## 2023-11-16 PROCEDURE — 1100000001 HC PRIVATE ROOM DAILY

## 2023-11-16 PROCEDURE — 99239 HOSP IP/OBS DSCHRG MGMT >30: CPT | Performed by: NURSE PRACTITIONER

## 2023-11-16 PROCEDURE — 82947 ASSAY GLUCOSE BLOOD QUANT: CPT

## 2023-11-16 PROCEDURE — 97112 NEUROMUSCULAR REEDUCATION: CPT | Mod: GP | Performed by: PHYSICAL THERAPIST

## 2023-11-16 PROCEDURE — 93971 EXTREMITY STUDY: CPT

## 2023-11-16 RX ORDER — LISINOPRIL 5 MG/1
5 TABLET ORAL DAILY
Start: 2023-11-16 | End: 2023-12-04 | Stop reason: SDUPTHER

## 2023-11-16 RX ORDER — LEVETIRACETAM 500 MG/1
500 TABLET ORAL 2 TIMES DAILY
Start: 2023-11-16 | End: 2024-04-08 | Stop reason: SDUPTHER

## 2023-11-16 RX ORDER — HEPARIN SODIUM 5000 [USP'U]/ML
7500 INJECTION, SOLUTION INTRAVENOUS; SUBCUTANEOUS EVERY 8 HOURS SCHEDULED
Start: 2023-11-16 | End: 2024-03-14 | Stop reason: WASHOUT

## 2023-11-16 RX ORDER — OXYCODONE HYDROCHLORIDE 5 MG/1
5 TABLET ORAL EVERY 4 HOURS PRN
Qty: 15 TABLET | Refills: 0
Start: 2023-11-16 | End: 2024-01-19 | Stop reason: WASHOUT

## 2023-11-16 RX ORDER — DEXAMETHASONE 2 MG/1
TABLET ORAL
Start: 2023-11-16 | End: 2024-03-14 | Stop reason: WASHOUT

## 2023-11-16 RX ORDER — ACETAMINOPHEN 325 MG/1
650 TABLET ORAL EVERY 6 HOURS PRN
Qty: 30 TABLET | Refills: 0
Start: 2023-11-16

## 2023-11-16 RX ORDER — ALBUTEROL SULFATE 90 UG/1
2 AEROSOL, METERED RESPIRATORY (INHALATION) EVERY 6 HOURS PRN
Status: DISCONTINUED | OUTPATIENT
Start: 2023-11-16 | End: 2023-11-17 | Stop reason: HOSPADM

## 2023-11-16 RX ORDER — AMOXICILLIN 250 MG
2 CAPSULE ORAL 2 TIMES DAILY
Start: 2023-11-16

## 2023-11-16 RX ADMIN — DEXAMETHASONE 2 MG: 2 TABLET ORAL at 01:58

## 2023-11-16 RX ADMIN — ALBUTEROL SULFATE 2.5 MG: 2.5 SOLUTION RESPIRATORY (INHALATION) at 09:40

## 2023-11-16 RX ADMIN — ACETAMINOPHEN 650 MG: 325 TABLET ORAL at 06:16

## 2023-11-16 RX ADMIN — DEXAMETHASONE 2 MG: 2 TABLET ORAL at 08:59

## 2023-11-16 RX ADMIN — LEVETIRACETAM 500 MG: 5 INJECTION INTRAVENOUS at 16:46

## 2023-11-16 RX ADMIN — ALBUTEROL SULFATE 2.5 MG: 2.5 SOLUTION RESPIRATORY (INHALATION) at 15:34

## 2023-11-16 RX ADMIN — HEPARIN SODIUM 7500 UNITS: 5000 INJECTION INTRAVENOUS; SUBCUTANEOUS at 13:55

## 2023-11-16 RX ADMIN — FLUTICASONE FUROATE AND VILANTEROL TRIFENATATE 1 PUFF: 100; 25 POWDER RESPIRATORY (INHALATION) at 09:41

## 2023-11-16 RX ADMIN — BUDESONIDE 0.5 MG: 0.5 INHALANT RESPIRATORY (INHALATION) at 09:41

## 2023-11-16 RX ADMIN — TIOTROPIUM BROMIDE INHALATION SPRAY 2 PUFF: 3.12 SPRAY, METERED RESPIRATORY (INHALATION) at 09:41

## 2023-11-16 RX ADMIN — HEPARIN SODIUM 7500 UNITS: 5000 INJECTION INTRAVENOUS; SUBCUTANEOUS at 06:14

## 2023-11-16 RX ADMIN — SENNOSIDES AND DOCUSATE SODIUM 2 TABLET: 8.6; 5 TABLET ORAL at 08:59

## 2023-11-16 RX ADMIN — ALBUTEROL SULFATE 2 PUFF: 90 AEROSOL, METERED RESPIRATORY (INHALATION) at 01:58

## 2023-11-16 RX ADMIN — ATENOLOL 50 MG: 50 TABLET ORAL at 08:59

## 2023-11-16 RX ADMIN — OXYCODONE HYDROCHLORIDE 10 MG: 5 TABLET ORAL at 00:37

## 2023-11-16 RX ADMIN — LEVETIRACETAM 500 MG: 5 INJECTION INTRAVENOUS at 06:31

## 2023-11-16 RX ADMIN — DEXAMETHASONE 2 MG: 2 TABLET ORAL at 16:46

## 2023-11-16 ASSESSMENT — COGNITIVE AND FUNCTIONAL STATUS - GENERAL
DRESSING REGULAR UPPER BODY CLOTHING: A LOT
DAILY ACTIVITIY SCORE: 14
CLIMB 3 TO 5 STEPS WITH RAILING: TOTAL
DRESSING REGULAR LOWER BODY CLOTHING: A LOT
MOBILITY SCORE: 8
TURNING FROM BACK TO SIDE WHILE IN FLAT BAD: A LOT
STANDING UP FROM CHAIR USING ARMS: TOTAL
MOVING FROM LYING ON BACK TO SITTING ON SIDE OF FLAT BED WITH BEDRAILS: A LOT
HELP NEEDED FOR BATHING: A LOT
EATING MEALS: A LITTLE
MOVING TO AND FROM BED TO CHAIR: TOTAL
TOILETING: A LOT
PERSONAL GROOMING: A LITTLE
WALKING IN HOSPITAL ROOM: TOTAL

## 2023-11-16 ASSESSMENT — PAIN SCALES - GENERAL
PAINLEVEL_OUTOF10: 3
PAINLEVEL_OUTOF10: 0 - NO PAIN
PAINLEVEL_OUTOF10: 0 - NO PAIN
PAINLEVEL_OUTOF10: 7
PAINLEVEL_OUTOF10: 0 - NO PAIN

## 2023-11-16 ASSESSMENT — PAIN DESCRIPTION - LOCATION
LOCATION: HEAD
LOCATION: HEAD

## 2023-11-16 ASSESSMENT — PAIN - FUNCTIONAL ASSESSMENT
PAIN_FUNCTIONAL_ASSESSMENT: 0-10

## 2023-11-16 ASSESSMENT — ACTIVITIES OF DAILY LIVING (ADL): HOME_MANAGEMENT_TIME_ENTRY: 15

## 2023-11-16 NOTE — SIGNIFICANT EVENT
Final EEG report from 11/15 negative for seizures.     Suspect right hemibody weakness is d/t post-surgical edema that is gradually improving post-op while on steroids.    Recommendations:  - discontinue EEG (please place discontinue order)   - continue keppra 500 mg BID  - continue steroids for vasogenic edema; dosing per neurosurgery  - attain DVT U/S of RUE as I noted increased warmth in this area though may be due to immobility  - order wrist splint and encourage elevation of arm slightly with pillow at rest and twice daily passive ROM exercise with family and of course, daily PT/OT  - referral to neurology clinic; to be seen in 3 weeks; can consider EMG at that time if weakness does not continue to resolve with time and steroids    Neurology will sign off.    Roxie Connors MD  PGY-3 Neurology  Neurology [id number]

## 2023-11-16 NOTE — PROGRESS NOTES
Occupational Therapy    OT Treatment    Patient Name: Ariane Chavis  MRN: [id number]  Today's Date: 11/16/2023  Time Calculation  Start Time: 1358  Stop Time: 1440  Time Calculation (min): 42 min         Assessment:  OT Assessment: Continue to reccommend high intensity OT to address ADLs, mobility, and endurance.  End of Session Communication: Bedside nurse  End of Session Patient Position: Bed, 3 rail up, Alarm on  OT Assessment Results: Decreased ADL status, Decreased upper extremity range of motion, Decreased upper extremity strength, Decreased endurance, Decreased IADLs, Decreased functional mobility  Plan:  Treatment Interventions: ADL retraining, Functional transfer training, UE strengthening/ROM, Endurance training  OT Frequency: 2 times per week  OT Discharge Recommendations: High intensity level of continued care  OT - OK to Discharge: Yes  Treatment Interventions: ADL retraining, Functional transfer training, UE strengthening/ROM, Endurance training    Subjective   Previous Visit Info:  OT Last Visit  OT Received On: 11/16/23  Precautions:  Medical Precautions: Fall precautions  Vital Signs:  Vital Signs  Heart Rate: 80  SpO2: 95 %  BP: 123/76  Patient Position:  (EOB)  Pain:  Pain Assessment  Pain Score: 0 - No pain    Objective    Cognition:  Cognition  Overall Cognitive Status: Within Functional Limits  Orientation Level: Oriented X4     Activities of Daily Living: Grooming  Grooming Level of Assistance: Close supervision, Setup  Grooming Where Assessed: Edge of bed  Grooming Comments:  (bath wipes for face hygiene)    UE Dressing  UE Dressing Level of Assistance: Moderate assistance  UE Dressing Where Assessed: Bed level  UE Dressing Comments:  (vinicius/[facility] gown)    Toileting  Toileting Level of Assistance: Maximum assistance  Where Assessed: Bed level  Toileting Comments:  (posterior hygiene with bath wipes)  Functional Standing Tolerance:     Bed Mobility/Transfers: Bed Mobility 1  Bed  Mobility 1: Supine to sitting, Sitting to supine  Level of Assistance 1: Moderate assistance (x2)  Bed Mobility 2  Bed Mobility  2: Scooting  Level of Assistance 2: Dependent  Bed Mobility Comments 2:  (draw sheet)  Bed Mobility 3  Bed Mobility 3: Scooting  Level of Assistance 3: Dependent    Transfer 1  Transfer From 1: Bed to, Chair with drop arm to  Trials/Comments 1:  (Mod assist x 2 stand pivot, mod assist x 2 squat pivot)  Transfers 2  Transfer From 2: Chair with drop arm to  Transfer to 2: Bed  Technique 2: Squat pivot, Stand pivot  Transfer Level of Assistance 2: Moderate assistance (x2)    Sitting Balance:  Static Sitting Balance  Static Sitting-Level of Assistance: Minimum assistance (mild lateral lean to the (R))  Static Sitting-Comment/Number of Minutes:  (15 min EOB and 15 min in chair)      Outcome Measures:[facility] Daily Activity  Putting on and taking off regular lower body clothing: A lot  Bathing (including washing, rinsing, drying): A lot  Putting on and taking off regular upper body clothing: A lot  Toileting, which includes using toilet, bedpan or urinal: A lot  Taking care of personal grooming such as brushing teeth: A little  Eating Meals: A little  Daily Activity - Total Score: 14   and Brief Confusion Assessment Method (bCAM)  CAM Result: CAM -    Education Documentation  Precautions, taught by Donya Bales OT at 11/16/2023  3:26 PM.  Learner: Patient  Readiness: Acceptance  Method: Explanation  Response: Verbalizes Understanding    ADL Training, taught by Donya Bales OT at 11/16/2023  3:26 PM.  Learner: Patient  Readiness: Acceptance  Method: Explanation  Response: Verbalizes Understanding    Education Comments  No comments found.        Goals:  Encounter Problems       Encounter Problems (Active)       ADLs       Pt will be mod assist x 1 vinicius/doffing pants at chair level (Progressing)       Start:  11/13/23    Expected End:  11/27/23               BALANCE       Pt will be SBA-CGA seated  EOB while performing grooming ADLs (Progressing)       Start:  11/13/23    Expected End:  11/27/23               Balance       Pt. will require CGA for static/dynamic sitting balance to safely participate in ADLs  (Progressing)       Start:  11/13/23    Expected End:  12/04/23               Functional mobility       Pt will complete STS without a device with Min A  (Progressing)       Start:  11/15/23    Expected End:  12/06/23               Mobility       Pt. will ambulate > 5 ft. with mod A and RW  (Not Progressing)       Start:  11/13/23    Expected End:  12/04/23            Pt. will perform bed mobility with mod A x 1  (Progressing)       Start:  11/13/23    Expected End:  12/04/23               TRANSFERS       Pt will be max assist x 1 seated EOB to BSC to complete toileting ADLs (Progressing)       Start:  11/13/23    Expected End:  11/27/23               Transfers       Pt. will transfer from STS with mod A x 1 and RW  (Progressing)       Start:  11/13/23    Expected End:  12/04/23

## 2023-11-16 NOTE — PROGRESS NOTES
"Ariane Chavis is a 48 y.o. female on day 7 of admission presenting with Meningioma (CMS/HCC).    Subjective   No acute events overnight, patient wondering when she will be discharged.    Objective     Physical Exam  Awake, Ox3   Face symmetric  L 5/5  RUE 0  RLE prox 2/5, distal 0  Incision c/d/I, staples    Last Recorded Vitals  Blood pressure 117/72, pulse 75, temperature 36 °C (96.8 °F), resp. rate 18, height 1.651 m (5' 5\"), weight 118 kg (260 lb 2.3 oz), SpO2 95 %.  Intake/Output last 3 Shifts:  I/O last 3 completed shifts:  In: - (0 mL/kg)   Out: 400 (3.4 mL/kg) [Urine:400 (0.1 mL/kg/hr)]  Weight: 118 kg     Relevant Results    Assessment/Plan   Principal Problem:    Meningioma (CMS/HCC)  Active Problems:    HTN (hypertension)    Obesity    Severe asthma    Ariane Chavis is a 48 y.o. female w h/o HTN, melanoma (knee, removed), cervical cancer (in remission), presenting with 2 episodes of transient L side weakness and numbness. Pt states she was suddenly unable to move her leg, and her arm felt weak, and this resolved shortly afterwards. Pt denies any headaches, nausea/vomiting, or any other symptoms. Patient currently feels back to baseline. CTH - left parietal extra-axial calcified mass  MRI brain - left parietal dural-based lesion with surrounding vasogenic edema, c/w meningioma, without brain compression or midline shift     11/9 MRV- meningioma abuts the posterior superior transverse sinus with mild narrowing, no evidence of dural venous thrombosis   CTAP - 5 cm R adnexal mass  11/10 s/p L crani for tumor resection; post op neuro exam change, CTH post op changes  11/11 MRI GTR, punctate diffusion hits but no sig stroke  11/14 pelvic ultrasound unable to visualize adnexal structures, L ovary   11/15 EEG negative, dc'd    PLAN:    Floor status  Neurology recs- RUE DVT US, OP follow up in 3 wks w/ EMG  Continue Keppra 500 BID  PTOT- rehab    Florence Bedoya MD      "

## 2023-11-16 NOTE — PROGRESS NOTES
11/16/23        Transitional Care Coordination Progress Note:   Patient discussed during interdisciplinary rounds.   Team members present: EAGLE ABURTO PCN   Plan per Medical/Surgical team: HTN, melanoma (knee, removed), cervical cancer presenting with 2 episodes of transient L side weakness and numbness.   Discharge disposition: Rehab   Status-Inpatient   Payer- Payor: [employer] / Plan: [employer] / Product Type: *No Product type* /    Potential Barriers: None   ADOD: 11/17/2023   Jose Carlos Rolon RN [facility] [phone]

## 2023-11-16 NOTE — HOSPITAL COURSE
48 y.o. female w h/o HTN, melanoma (knee, removed), cervical cancer (in remission) presented 11/9/23 with 2 episodes of transient L side weakness and numbness. Pt stated she was suddenly unable to move her leg, and her arm felt weak, and this resolved shortly afterwards. Pt denied any headaches, nausea/vomiting, or any other symptoms. CTH showed a left parietal extra-axial calcified mass.  MRI brain showed a left parietal dural-based lesion with surrounding vasogenic edema, c/w meningioma, without brain compression or midline shift.    11/9 MRV- meningioma abuts the posterior superior transverse sinus with mild narrowing, no evidence of dural venous thrombosis.  Patient was taken to the OR 11/10/23 for a L crani for tumor resection; post op neuro exam change, CTH post op changes.  MRI GTR, punctate diffusion hits. CT C/A/P showed a left adnexal mass and pelvic ultrasound unable to visualize adnexal structures, L ovary; patient has follow up with her GYN at [facility], Dr. Marsha Waller.  11/15 EEG negative and discontinued.  Neurology consulted for post-operative right hemibody paresis and recommended RUE U/S (negative for DVT), continuing steroids for vasogenic edema, continuing Keppra and following up with the outpatient epilepsy clinic in 4-6 weeks to discuss duration of medication (appointment requested via [facility]).  PT/OT evaluated and recommended acute rehabilitation. Patient was discharged to acute rehab in satisfactory condition and with scheduled follow up.

## 2023-11-16 NOTE — PROGRESS NOTES
Patient Care Navigator Note - Transportation arranged with [facility] for a 5:00 pm  via stretcher.  Pt will be discharge to [facility].   Nurse report number is [phone].    Liliana Walker

## 2023-11-16 NOTE — CARE PLAN
The clinical goals for the shift include Patient will have no decline in neurologic exam overnight.    Over the shift, the patient had no acute events and had a stable exam.

## 2023-11-16 NOTE — DISCHARGE SUMMARY
Discharge Diagnosis  Meningioma (CMS/HCC)    Issues Requiring Follow-Up  R adnexal mass seen on CT and U/S (Patient follows with Dr. Marsha Waller, GYN - [facility].    Test Results Pending At Discharge  Pending Labs       No current pending labs.            Hospital Course  48 y.o. female w h/o HTN, melanoma (knee, removed), cervical cancer (in remission) presented 11/9/23 with 2 episodes of transient L side weakness and numbness. Pt stated she was suddenly unable to move her leg, and her arm felt weak, and this resolved shortly afterwards. Pt denied any headaches, nausea/vomiting, or any other symptoms. CTH showed a left parietal extra-axial calcified mass.  MRI brain showed a left parietal dural-based lesion with surrounding vasogenic edema, c/w meningioma, without brain compression or midline shift.    11/9 MRV- meningioma abuts the posterior superior transverse sinus with mild narrowing, no evidence of dural venous thrombosis.  Patient was taken to the OR 11/10/23 for a L crani for tumor resection; post op neuro exam change, CTH post op changes.  MRI GTR, punctate diffusion hits. CT C/A/P showed a left adnexal mass and pelvic ultrasound unable to visualize adnexal structures, L ovary; patient has follow up with her GYN at [facility], Dr. Marsha Waller.  11/15 EEG negative and discontinued.  Neurology consulted for post-operative right hemibody paresis and recommended RUE U/S (negative for DVT), continuing steroids for vasogenic edema, continuing Keppra and following up with the outpatient epilepsy clinic in 4-6 weeks to discuss duration of medication (appointment requested via [facility]).  PT/OT evaluated and recommended acute rehabilitation. Patient was discharged to acute rehab in satisfactory condition and with scheduled follow up.       Pertinent Physical Exam At Time of Discharge  Physical Exam  Gen: Alert, Ox3, in NAD;  HEENT: Incision intact;  CVS: RR, S1 S2, no m/r/g;  Pulm: Chest expansion symmetric;  Abd:  Soft, NT/ND:  Ext: +1 edema x 4;  Neuro: A&Ox3, L FC 5/5, RUE 0, RLE HF 2, distal 0;  Psych: Calm and cooperative;    Home Medications     Medication List      START taking these medications     acetaminophen 325 mg tablet; Commonly known as: Tylenol; Take 2 tablets   (650 mg) by mouth every 6 hours if needed for mild pain (1 - 3).   dexAMETHasone 2 mg tablet; Commonly known as: Decadron; Take 1 tablet   oral every 8 hours through 11/17/23 then 1 tablet every 12 hours x 2 days   then 1 tablet every 24 hours x 2 days then Stop   heparin (porcine) 5,000 unit/mL injection; Inject 1.5 mL (7,500 Units)   under the skin every 8 hours. Until fully ambulatory   levETIRAcetam 500 mg tablet; Commonly known as: Keppra; Take 1 tablet   (500 mg) by mouth 2 times a day. Please follow up with neurology for   ongoing management of this medication.   oxyCODONE 5 mg immediate release tablet; Commonly known as: Roxicodone;   Take 1 tablet (5 mg) by mouth every 4 hours if needed for moderate pain (4   - 6) or severe pain (7 - 10).   sennosides-docusate sodium 8.6-50 mg tablet; Commonly known as:   Georgina-Colace; Take 2 tablets by mouth 2 times a day.     CHANGE how you take these medications     lisinopril 5 mg tablet; Take 1 tablet (5 mg) by mouth once daily.; What   changed: medication strength, how much to take   omalizumab 150 mg/mL injection; Commonly known as: Xolair; Inject 1 mL   (150 mg) under the skin every 28 (twenty-eight) days. October 15th last   dose.  Do NOT resume until December dosing. Do not start before December   15, 2023.; Start taking on: December 15, 2023; What changed: additional   instructions, These instructions start on December 15, 2023. If you are   unsure what to do until then, ask your doctor or other care provider.     CONTINUE taking these medications     albuterol 90 mcg/actuation inhaler   ammonium lactate 12 % cream; Commonly known as: Amlactin   atenoloL-chlorthalidone 50-25 mg tablet; Commonly known  as: Tenoretic   azelastine 0.05 % ophthalmic solution; Commonly known as: Optivar   budesonide 1 mg/2 mL nebulizer solution; Commonly known as: Pulmicort   cetirizine 5 mg tablet; Commonly known as: ZyrTEC   fluticasone 50 mcg/actuation nasal spray; Commonly known as: Flonase   levalbuterol 1.25 mg/3 mL nebulizer solution; Commonly known as: Xopenex   montelukast 10 mg tablet; Commonly known as: Singulair   polyethylene glycol 17 gram packet; Commonly known as: Glycolax, Miralax   Trelegy Ellipta 200-62.5-25 mcg blister with device; Generic drug:   fluticasone-umeclidin-vilanter   triamcinolone 0.1 % ointment; Commonly known as: Kenalog     STOP taking these medications     predniSONE 5 mg tablet; Commonly known as: Deltasone       Outpatient Follow-Up  Future Appointments   Date Time Provider Department Center   11/29/2023 12:15 PM Bayron Mahan MD [id number] [facility]   12/18/2023  5:30 PM Macy Yap MD [id number] Ferdinand Iqbal, APRN-CNP

## 2023-11-16 NOTE — PROGRESS NOTES
Physical Therapy    Physical Therapy Treatment    Patient Name: Ariane Chavis  MRN: [id number]  Today's Date: 11/16/2023  Time Calculation  Start Time: 1358  Stop Time: 1440  Time Calculation (min): 42 min       Assessment/Plan   PT Assessment  PT Assessment Results: Decreased strength, Decreased endurance, Impaired balance, Impaired tone, Decreased mobility, Decreased coordination, Decreased cognition, Impaired vision, Impaired sensation, Obesity, Decreased skin integrity  Rehab Prognosis: Excellent  Evaluation/Treatment Tolerance: Patient tolerated treatment well  Medical Staff Made Aware: Yes (RN)  Strengths: Coping skills, Support of Caregivers, Premorbid level of function  Barriers to Participation:  (none)  End of Session Communication: Bedside nurse  Assessment Comment: Pt. is a 47 y/o female with Left parietal meningioma s/p crani/resection with Right UE/LE hypertonus, decreased isolated movement, impaired balance, and intermittent tearfulness; improved mobility, sitting midline and overall alertness today with ability to do transfers. Recommend high intensity therapy as pt needs all 3 therapies, appears very motivated, has great support, is not at [facility], is making gains and has complex rehab and equipment needs.  End of Session Patient Position: Bed, 3 rail up, Alarm on  PT Plan  Inpatient/Swing Bed or Outpatient: Inpatient  PT Plan  Treatment/Interventions: Bed mobility, Transfer training, Gait training, Balance training, Neuromuscular re-education, Endurance training, Strengthening, Therapeutic activity, Positioning, Postural re-education  PT Plan: Skilled PT  PT Frequency: 5 times per week  PT Discharge Recommendations: High intensity level of continued care  Equipment Recommended upon Discharge:  (TBD)  PT Recommended Transfer Status:  (not yet safe to transfer)  PT - OK to Discharge: Yes (PT eval complete and DC recs made)      General Visit Information:   PT  Visit  PT Received On:  11/16/23  General  Reason for Referral: Admitted 11/10 presenting with 2 episodes of transient L side weakness and numbness; dx: Left parietal menincioma, s/p L crani for tumor resection  Past Medical History Relevant to Rehab: PMHx: HTN, melanoma (knee, removed), cervical cancer (in remission), asthma,  Missed Visit: No  Family/Caregiver Present: Yes  Caregiver Feedback: Son's girlfriend present and engaged. 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  Co-Treatment: OT  Co-Treatment Reason: need for 2 skilled sets of hands for safety and to maximize therapeutic effects  Prior to Session Communication: Bedside nurse  Patient Position Received: Bed, 3 rail up, Alarm on  Preferred Learning Style: verbal  General Comment: Pt supine alert and son's girlfriend present. Pt very engaged and able to work on transfers as noted.    Subjective   Precautions:  Precautions  Hearing/Visual Limitations: has mild inattention or field cut Right side (TBD)  Medical Precautions: Fall precautions  Post-Surgical Precautions:  (crani, Right paresis with hypertonic knee and elbow extensors)  Vital Signs:  Vital Signs  Heart Rate:  (sitting EOB: /76 HR 80 O2 95%)    Objective   Pain:  Pain Assessment  Pain Assessment: 0-10  Pain Score: 0 - No pain  Cognition:  Cognition  Overall Cognitive Status: Within Functional Limits  Orientation Level: Oriented X4  Postural Control:  Postural Control  Postural Control: Impaired  Trunk Control: Left/posterior lean, some pushing with Left UE but does well with cues  Extremity/Trunk Assessments:  RUE   RUE : Exceptions to WFL  RUE Strength  RUE Overall Strength:  (Right shoulder horizontal adduction 2-, shrug 2-, shoulder internal rotation 2-; elbow extension 1+)  RUE Tone  RUE Tone: Hypertonic     RLE   RLE : Exceptions to WFL  PROM RLE (degrees)  RLE PROM Comment: DF with KE to neutral with overpressure, knee/ hip flexion ~90 degrees  Strength RLE  RLE Overall Strength:  (Right hip adduction  2+, knee extension 1+ (overcome by hypertonus))  Tone RLE  RLE Tone: Hypertonic     Activity Tolerance:  Activity Tolerance  Endurance:  (frequent sitting rest breaks)  Treatments:  Balance/Neuromuscular Re-Education  Balance/Neuromuscular Re-Education Activity Performed: Yes  Balance/Neuromuscular Re-Education Activity 1: faciliation of rolling each direction to change pt/clean pt and bed  Balance/Neuromuscular Re-Education Activity 2: static sitting balance- facilitation of incorporation ofr Right side/midline traininig  Balance/Neuromuscular Re-Education Activity 3: transfer training- sit pivot to/from chair with armrest removed; modifed stand pivot to/from chair to bed with armress removed (as noted below)    Bed Mobility  Bed Mobility: Yes  Bed Mobility 1  Bed Mobility 1: Rolling right, Rolling left  Level of Assistance 1: Minimal verbal cues, Minimal tactile cues, Moderate assistance (min/mod A to Right, mod/max A to [place])  Bed Mobility Comments 1: use of rail and drawsheet  Bed Mobility 2  Bed Mobility  2: Side lying left to sit  Level of Assistance 2: Moderate assistance, Moderate verbal cues, Moderate tactile cues (+2)  Bed Mobility Comments 2: HOB elevated 65 degrees, in and out on Right side, use of rail and drawsheet  Bed Mobility 3  Bed Mobility 3: Scooting  Level of Assistance 3: Dependent (+2)    Ambulation/Gait Training  Ambulation/Gait Training Performed: No  Transfers  Transfer: Yes  Transfer 1  Transfer From 1: Bed to (chair with removable arms)  Transfer to 1: Bed (chair with removable arms)  Technique 1: Stand pivot (modified stand pivot)  Transfer Device 1:  (no device)  Transfer Level of Assistance 1: +2, Moderate tactile cues, Moderate verbal cues, Moderate assistance, Arm in arm assistance  Trials/Comments 1: 4 trials, up for  seconds each  Transfers 2  Transfer From 2: Bed to (chair with removable arms)  Transfer to 2: Bed (chair with removable arms)  Technique 2: Sit pivot  Transfer  Level of Assistance 2: Moderate assistance, +2, Moderate tactile cues, Moderate verbal cues  Transfers 3  Transfer From 3: Sit to, Stand to  Transfer to 3: Sit, Stand  Technique 3: Sit to stand, Stand to sit  Transfer Device 3:  (no device)  Transfer Level of Assistance 3: Moderate assistance, +2, Arm in arm assistance  Trials/Comments 3: x 4 reps    Outcome Measures:  [facility] Basic Mobility  Turning from your back to your side while in a flat bed without using bedrails: A lot  Moving from lying on your back to sitting on the side of a flat bed without using bedrails: A lot  Moving to and from bed to chair (including a wheelchair): Total  Standing up from a chair using your arms (e.g. wheelchair or bedside chair): Total  To walk in hospital room: Total  Climbing 3-5 steps with railing: Total  Basic Mobility - Total Score: 8    Education Documentation  Mobility Training, taught by Nelda Ayala, PT at 11/16/2023  3:31 PM.  Learner: Other, Patient  Readiness: Eager  Method: Explanation, Demonstration  Response: Verbalizes Understanding, Demonstrated Understanding, Needs Reinforcement  Comment: bed mobility, sitting midline balance, sitting and stand pivot transfers    Education Comments  No comments found.        OP EDUCATION:       Encounter Problems       Encounter Problems (Active)       Balance       Pt. will require CGA for static/dynamic sitting balance to safely participate in ADLs  (Progressing)       Start:  11/13/23    Expected End:  12/04/23               Functional mobility       Pt will complete STS without a device with Min A  (Progressing)       Start:  11/15/23    Expected End:  12/06/23               Mobility       Pt. will ambulate > 5 ft. with mod A and RW  (Not Progressing)       Start:  11/13/23    Expected End:  12/04/23            Pt. will perform bed mobility with mod A x 1  (Progressing)       Start:  11/13/23    Expected End:  12/04/23               Pain - Adult          Transfers       Pt.  will transfer from STS with mod A x 1 and RW  (Progressing)       Start:  11/13/23    Expected End:  12/04/23

## 2023-11-17 ENCOUNTER — HOSPITAL ENCOUNTER (OUTPATIENT)
Dept: CARDIOLOGY | Facility: HOSPITAL | Age: 48
Discharge: HOME | End: 2023-11-17
Payer: COMMERCIAL

## 2023-11-17 LAB
ATRIAL RATE: 82 BPM
P AXIS: 27 DEGREES
P OFFSET: 199 MS
P ONSET: 140 MS
PR INTERVAL: 148 MS
Q ONSET: 214 MS
QRS COUNT: 14 BEATS
QRS DURATION: 94 MS
QT INTERVAL: 414 MS
QTC CALCULATION(BAZETT): 483 MS
QTC FREDERICIA: 459 MS
R AXIS: 52 DEGREES
T AXIS: 15 DEGREES
T OFFSET: 421 MS
VENTRICULAR RATE: 82 BPM

## 2023-11-17 PROCEDURE — 93005 ELECTROCARDIOGRAM TRACING: CPT

## 2023-11-29 ENCOUNTER — OFFICE VISIT (OUTPATIENT)
Dept: NEUROSURGERY | Facility: HOSPITAL | Age: 48
End: 2023-11-29
Payer: COMMERCIAL

## 2023-11-29 VITALS
BODY MASS INDEX: 40.18 KG/M2 | HEIGHT: 66 IN | SYSTOLIC BLOOD PRESSURE: 122 MMHG | WEIGHT: 250 LBS | DIASTOLIC BLOOD PRESSURE: 78 MMHG | RESPIRATION RATE: 18 BRPM | HEART RATE: 86 BPM

## 2023-11-29 DIAGNOSIS — G93.89 BRAIN MASS: Primary | ICD-10-CM

## 2023-11-29 PROCEDURE — 1036F TOBACCO NON-USER: CPT | Performed by: NEUROLOGICAL SURGERY

## 2023-11-29 PROCEDURE — 99024 POSTOP FOLLOW-UP VISIT: CPT | Performed by: NEUROLOGICAL SURGERY

## 2023-11-29 PROCEDURE — 3078F DIAST BP <80 MM HG: CPT | Performed by: NEUROLOGICAL SURGERY

## 2023-11-29 PROCEDURE — 3074F SYST BP LT 130 MM HG: CPT | Performed by: NEUROLOGICAL SURGERY

## 2023-11-29 ASSESSMENT — ENCOUNTER SYMPTOMS
FATIGUE: 1
PSYCHIATRIC NEGATIVE: 1
WEAKNESS: 1
CONSTIPATION: 1
ENDOCRINE NEGATIVE: 1
ACTIVITY CHANGE: 1
NUMBNESS: 1
RESPIRATORY NEGATIVE: 1
MUSCULOSKELETAL NEGATIVE: 1
EYES NEGATIVE: 1
HEMATOLOGIC/LYMPHATIC NEGATIVE: 1

## 2023-11-29 NOTE — PROGRESS NOTES
"11-10-23 crani for meningioma. Today in rehab and her not able to move the right side.     48-year-old woman returns for follow-up after resection of left parietal meningioma complicated by right hemiplegia.  She has been inpatient rehabilitation.  She reports feeling more sensation on her right side and having very small gains in motor function but is still densely paretic.  She denies headaches.    Review of Systems   Constitutional:  Positive for activity change and fatigue.   HENT: Negative.     Eyes: Negative.    Respiratory: Negative.     Cardiovascular:  Positive for leg swelling.   Gastrointestinal:  Positive for constipation.   Endocrine: Negative.    Genitourinary:         Uti     Musculoskeletal: Negative.    Skin: Negative.    Allergic/Immunologic: Positive for environmental allergies.   Neurological:  Positive for weakness and numbness.   Hematological: Negative.    Psychiatric/Behavioral: Negative.         Visit Vitals  /78   Pulse 86   Resp 18   Ht 1.676 m (5' 6\")   Wt 113 kg (250 lb)   BMI 40.35 kg/m²   OB Status Hysterectomy   Smoking Status Never   BSA 2.29 m²           Current Outpatient Medications:     acetaminophen (Tylenol) 325 mg tablet, Take 2 tablets (650 mg) by mouth every 6 hours if needed for mild pain (1 - 3)., Disp: 30 tablet, Rfl: 0    albuterol 90 mcg/actuation inhaler, Inhale 2 puffs every 6 hours if needed for wheezing., Disp: , Rfl:     atenoloL-chlorthalidone (Tenoretic) 50-25 mg tablet, Take 1 tablet by mouth once daily., Disp: , Rfl:     cetirizine (ZyrTEC) 5 mg tablet, Take 1 tablet (5 mg) by mouth once daily., Disp: , Rfl:     heparin sodium,porcine (heparin, porcine,) 5,000 unit/mL injection, Inject 1.5 mL (7,500 Units) under the skin every 8 hours. Until fully ambulatory, Disp: , Rfl:     lisinopril 5 mg tablet, Take 1 tablet (5 mg) by mouth once daily., Disp: , Rfl:     montelukast (Singulair) 10 mg tablet, Take 1 tablet (10 mg) by mouth once daily at bedtime., Disp: " , Rfl:     polyethylene glycol (Glycolax, Miralax) 17 gram packet, Take 17 g by mouth once daily., Disp: , Rfl:     ammonium lactate (Amlactin) 12 % cream, Apply 1 Application topically if needed for dry skin., Disp: , Rfl:     azelastine (Optivar) 0.05 % ophthalmic solution, Administer 1 drop into both eyes 2 times a day as needed (allergies)., Disp: , Rfl:     budesonide (Pulmicort) 1 mg/2 mL nebulizer solution, Inhale 2 mL (1 mg) twice a day., Disp: , Rfl:     dexAMETHasone (Decadron) 2 mg tablet, Take 1 tablet oral every 8 hours through 11/17/23 then 1 tablet every 12 hours x 2 days then 1 tablet every 24 hours x 2 days then Stop (Patient not taking: Reported on 11/29/2023), Disp: , Rfl:     fluticasone (Flonase) 50 mcg/actuation nasal spray, Administer 1 spray into each nostril once daily. Shake gently. Before first use, prime pump. After use, clean tip and replace cap., Disp: , Rfl:     fluticasone-umeclidin-vilanter (Trelegy Ellipta) 200-62.5-25 mcg blister with device, Inhale 1 puff once daily., Disp: , Rfl:     levalbuterol (Xopenex) 1.25 mg/3 mL nebulizer solution, Inhale 1 ampule every 4 hours if needed for wheezing or shortness of breath., Disp: , Rfl:     levETIRAcetam (Keppra) 500 mg tablet, Take 1 tablet (500 mg) by mouth 2 times a day. Please follow up with neurology for ongoing management of this medication. (Patient not taking: Reported on 11/29/2023), Disp: , Rfl:     [START ON 12/15/2023] omalizumab (Xolair) 150 mg/mL injection, Inject 1 mL (150 mg) under the skin every 28 (twenty-eight) days. October 15th last dose.  Do NOT resume until December dosing. Do not start before December 15, 2023. (Patient not taking: Reported on 11/29/2023 Do not start before December 15, 2023.), Disp: , Rfl:     oxyCODONE (Roxicodone) 5 mg immediate release tablet, Take 1 tablet (5 mg) by mouth every 4 hours if needed for moderate pain (4 - 6) or severe pain (7 - 10). (Patient not taking: Reported on 11/29/2023),  Disp: 15 tablet, Rfl: 0    sennosides-docusate sodium (Georgina-Colace) 8.6-50 mg tablet, Take 2 tablets by mouth 2 times a day. (Patient not taking: Reported on 11/29/2023), Disp: , Rfl:     triamcinolone (Kenalog) 0.1 % ointment, Apply 1 Application topically 2 times a day as needed for rash., Disp: , Rfl:       Objective   Neurological Exam    On physical exam, the patient is alert and interactive.  Extraocular movements are intact.  Her incision is clean and intact.  The patient remains densely paretic on the right.    The patient continues to recover from her surgery.  She had a grade 1 meningioma.  She does not require further treatment at this time.  Will obtain a new MRI of the brain with and without contrast to look for the cause of the persistent weakness.  I would like to see her again in 3 months for further monitoring.

## 2023-12-04 DIAGNOSIS — G93.89 BRAIN MASS: Primary | ICD-10-CM

## 2023-12-04 DIAGNOSIS — I15.9 SECONDARY HYPERTENSION: ICD-10-CM

## 2023-12-04 RX ORDER — LISINOPRIL 5 MG/1
5 TABLET ORAL DAILY
Qty: 90 TABLET | Refills: 3 | Status: SHIPPED | OUTPATIENT
Start: 2023-12-04 | End: 2024-04-08 | Stop reason: DRUGHIGH

## 2023-12-04 RX ORDER — ATENOLOL AND CHLORTHALIDONE TABLET 50; 25 MG/1; MG/1
1 TABLET ORAL DAILY
Qty: 90 TABLET | Refills: 3 | Status: SHIPPED | OUTPATIENT
Start: 2023-12-04

## 2023-12-05 ENCOUNTER — NURSING HOME VISIT (OUTPATIENT)
Dept: POST ACUTE CARE | Facility: EXTERNAL LOCATION | Age: 48
End: 2023-12-05
Payer: COMMERCIAL

## 2023-12-05 DIAGNOSIS — G81.01 FLACCID HEMIPLEGIA OF RIGHT DOMINANT SIDE DUE TO NONCEREBROVASCULAR ETIOLOGY (MULTI): ICD-10-CM

## 2023-12-05 DIAGNOSIS — J45.909 SEVERE ASTHMA, UNSPECIFIED WHETHER COMPLICATED, UNSPECIFIED WHETHER PERSISTENT (HHS-HCC): ICD-10-CM

## 2023-12-05 DIAGNOSIS — I10 PRIMARY HYPERTENSION: ICD-10-CM

## 2023-12-05 DIAGNOSIS — Z98.890 STATUS POST BRAIN SURGERY: ICD-10-CM

## 2023-12-05 DIAGNOSIS — G93.89 BRAIN MASS: Primary | ICD-10-CM

## 2023-12-05 PROCEDURE — 99310 SBSQ NF CARE HIGH MDM 45: CPT | Performed by: NURSE PRACTITIONER

## 2023-12-05 NOTE — LETTER
Patient: Ariane Chavis  : 1975    Encounter Date: 2023    Name: Ariane Chavis  YOB: 1975    INITIAL NP FOLLOW UP VISIT: SNF, Meningioma, s/p   Craniectomy w/excision tissue left and navigation mesh cranioplasty    HPI   The patient is a 48 yr old female who is here at [place] for skilled care after a hospitalization at [facility] from  - 23 and then a stay at [facility] from  - 12/3/23.   PMH of HTN, asthma, melanoma (knee, removed), cervical cancer (in remission) presented 23 with 2 episodes of transient L side weakness and numbness. Pt reported she was suddenly unable to move her leg, and her arm felt weak, and this resolved shortly afterwards. Pt denied any headaches, nausea/vomiting, or any other symptoms. CTH showed a left parietal extra-axial calcified mass.  MRI brain showed a left parietal dural-based lesion with surrounding vasogenic edema, c/w meningioma, without brain compression or midline shift.     MRV- meningioma abuts the posterior superior transverse sinus with mild narrowing, no evidence of dural venous thrombosis.  Patient was taken to the OR 11/10/23 for a L crani for tumor resection; post op neuro exam change, CTH post op changes.  MRI GTR, punctate diffusion hits. CT C/A/P showed a left adnexal mass and pelvic ultrasound unable to visualize adnexal structures, L ovary; patient has follow up with her GYN at [facility], Dr. Marsha Waller.  11/15 EEG negative and discontinued. Neurology consulted for post-operative right hemibody paresis and recommended RUE U/S (negative for DVT), continuing steroids for vasogenic edema, continuing Keppra and following up with the outpatient epilepsy clinic in 4-6 weeks to discuss duration of medication (appointment requested via [facility]).   I am familiar with the patient from [facility]. She continues to have profound right sided hemiparesis.   Currently, she is sitting up in her room with her  "family at the bedside. She appears comfortable and in no acute distress. She reports a stitch that was left in the back of her head. She was seen by Neurology and had staples removed. The stitch is sitting at the back of head sticking out and per patient poking her. She denies CP or SOB. Right side upper and lower extremity 0/5 power grade.       REVIEW OF SYSTEMS:  Review of systems are negative except where noted in HPI.    /70   Pulse 84   Temp 36.3 °C (97.4 °F)   Resp 18   Ht 1.676 m (5' 6\")   Wt 111 kg (245 lb)   BMI 39.54 kg/m²      Physical Exam  Constitutional:       Appearance: Normal appearance. She is obese.   HENT:      Head: Normocephalic.      Comments: Left crani scar noted and healing well. TARA, one stitch sticking out of occipital region of head     Right Ear: External ear normal.      Left Ear: External ear normal.      Nose: Nose normal.      Mouth/Throat:      Mouth: Mucous membranes are moist.   Eyes:      Extraocular Movements: Extraocular movements intact.      Conjunctiva/sclera: Conjunctivae normal.      Pupils: Pupils are equal, round, and reactive to light.   Cardiovascular:      Rate and Rhythm: Normal rate and regular rhythm.      Pulses: Normal pulses.      Heart sounds: Normal heart sounds.   Pulmonary:      Effort: Pulmonary effort is normal.      Breath sounds: Normal breath sounds.   Abdominal:      General: Bowel sounds are normal. There is no distension.      Palpations: Abdomen is soft.      Tenderness: There is no abdominal tenderness.   Genitourinary:     Comments: Not examined  Musculoskeletal:         General: Normal range of motion.      Cervical back: Normal range of motion and neck supple.      Right lower leg: Edema present.      Left lower leg: Edema present.      Comments: Generalized weakness, right sided    Skin:     General: Skin is warm and dry.      Capillary Refill: Capillary refill takes less than 2 seconds.   Neurological:      General: No focal " deficit present.      Mental Status: She is alert and oriented to person, place, and time. Mental status is at baseline.      Motor: Weakness present.      Comments: Right sided upper and lower weakness, power grade 0/5   Psychiatric:         Mood and Affect: Mood normal.         Behavior: Behavior normal.         Thought Content: Thought content normal.         Judgment: Judgment normal.          Living will related issues reviewed-CODE STATUS: Full code    DISCHARGE PLANNING: no date at this time  Discharge Home when goals are met.   Follow up with PCP in 1-2 weeks after discharge from facility.   [facility] to follow after discharge for continued PT/OT and Nursing.    RECENT HOSPITALIZATION DATES:   All laboratories, diagnostic tests, progress notes, and consultation notes reviewed from recent hospitalization.     LABORATORIES OR DIAGNOSTIC TESTS DONE/REVIEWED IN FACILITY:  Pending 12/8/23    MEDICATIONS REVIEWED AT THE FACILITY:  Singulair  Atenolol & Chlorthalidone  Trelegy Ellipta Inhalation Aerosol Powder Breath Activated 200-62.5-25 MCG/ACT (Fluticasone-Umeclidinium-Vilanterol)  Assessment/Plan   Problem List Items Addressed This Visit       Brain mass - Primary    HTN (hypertension)    Severe asthma    Status post brain surgery    Flaccid hemiplegia affecting right dominant side (CMS/HCC)       Skin Integrity:  Nursing to monitor skin integrity as patient is at risk for pressure injuries.  Wound care per nursing  Left crani - TARA, remove stitch  See Facility notes for measurements/assessments  Turn and reposition Q 2 hours or more  Air mattress and when up in chair cushion reducing device  Dietician to evaluate and recommend:  Nutritional supplement:  Please monitor skin integrity and other pressure areas  Referral to wound clinic if appropriate:    PLAN:   Recent nursing evaluation and notes were reviewed.   Overall, patient is stable despite his/her chronic conditions.    #Meningioma, s/p craniectomy, post op  right hemiparesis, Weakness and physical deconditioning: PT/OT/ST to maximize strength, function, and endurance all while maintaining safety.   DVT-prophylaxis: Heparin TID  levETIRAcetam Oral Tablet 500 MG (Levetiracetam) BID (Seizure prophylaxis)  #HTN: Atenolol-Chlorthalidone Oral Tablet 50-25 MG daily, Zestril Oral Tablet 5 MG daily  #Asthma: Montelukast Sodium Oral Tablet 10 MG at bedtime, Trelegy Ellipta Inhalation Aerosol Powder Breath Activated 200-62.5-25 MCG/ACT one puff daily, Proventil HFA Inhalation Aerosol Solution 108 (90 Base) MCG/ACT 2 puffs Q6hr PRN, Budesonide Inhalation Suspension 1 MG/2ML BID, Flonase daily  Any decline or change in condition needs to be communicated with the physician or myself.    Discussion with nursing staff regarding ongoing care and management.  If needed, would communicate with family who are not present at this time.   There are no concerns at this time.    We will continue with the medications noted above.    We will continue to follow the patient here at the facility.    *Please note that nursing facility, outside laboratory agency, and  [facility] do not interface.     Completion of the note was done through Dragon voice recognition technology and may include   unintended or grammatical errors which may not have been recognized when finalizing the note.     Time: I spent 45 minutes or greater with the patient. Greater than 50% of this time was spent in counseling and or coordination of care. The time includes prep time of reviewing vital signs, report from direct nursing staff and or therapists, hospital documentation, reviewing labs, radiographs, diagnostic tests and or consultations, time directly spent with the patient interviewing, examining, and education regarding diagnosis, treatments, and medications, as well as documentation in the electronic medical record, and reviewing the plan of care and any new orders with the patient, nursing staff and other staff directly  Biopsy Photograph Reviewed: Yes related to the patients care.       LAUREN Garza       Electronically Signed By: LAUREN Garza   12/11/23  6:58 PM

## 2023-12-11 VITALS
HEIGHT: 66 IN | WEIGHT: 245 LBS | RESPIRATION RATE: 18 BRPM | DIASTOLIC BLOOD PRESSURE: 70 MMHG | SYSTOLIC BLOOD PRESSURE: 150 MMHG | TEMPERATURE: 97.4 F | BODY MASS INDEX: 39.37 KG/M2 | HEART RATE: 84 BPM

## 2023-12-11 PROBLEM — Z98.890 STATUS POST BRAIN SURGERY: Status: ACTIVE | Noted: 2023-12-11

## 2023-12-11 PROBLEM — G81.01 FLACCID HEMIPLEGIA AFFECTING RIGHT DOMINANT SIDE (MULTI): Status: ACTIVE | Noted: 2023-12-11

## 2023-12-11 NOTE — PROGRESS NOTES
Name: Ariane Chavis  YOB: 1975    INITIAL NP FOLLOW UP VISIT: SNF, Meningioma, s/p   Craniectomy w/excision tissue left and navigation mesh cranioplasty    HPI   The patient is a 48 yr old female who is here at [facility] for skilled care after a hospitalization at [facility] from 11/9 - 11/16/23 and then a stay at [facility] from 11/16 - 12/3/23.   PMH of HTN, asthma, melanoma (knee, removed), cervical cancer (in remission) presented 11/9/23 with 2 episodes of transient L side weakness and numbness. Pt reported she was suddenly unable to move her leg, and her arm felt weak, and this resolved shortly afterwards. Pt denied any headaches, nausea/vomiting, or any other symptoms. CTH showed a left parietal extra-axial calcified mass.  MRI brain showed a left parietal dural-based lesion with surrounding vasogenic edema, c/w meningioma, without brain compression or midline shift.    11/9 MRV- meningioma abuts the posterior superior transverse sinus with mild narrowing, no evidence of dural venous thrombosis.  Patient was taken to the OR 11/10/23 for a L crani for tumor resection; post op neuro exam change, CTH post op changes.  MRI GTR, punctate diffusion hits. CT C/A/P showed a left adnexal mass and pelvic ultrasound unable to visualize adnexal structures, L ovary; patient has follow up with her GYN at [facility], Dr. Marsha Waller.  11/15 EEG negative and discontinued. Neurology consulted for post-operative right hemibody paresis and recommended RUE U/S (negative for DVT), continuing steroids for vasogenic edema, continuing Keppra and following up with the outpatient epilepsy clinic in 4-6 weeks to discuss duration of medication (appointment requested via [facility]).   I am familiar with the patient from [facility]. She continues to have profound right sided hemiparesis.   Currently, she is sitting up in her room with her family at the bedside. She appears comfortable and in no acute distress.  "She reports a stitch that was left in the back of her head. She was seen by Neurology and had staples removed. The stitch is sitting at the back of head sticking out and per patient poking her. She denies CP or SOB. Right side upper and lower extremity 0/5 power grade.       REVIEW OF SYSTEMS:  Review of systems are negative except where noted in HPI.    /70   Pulse 84   Temp 36.3 °C (97.4 °F)   Resp 18   Ht 1.676 m (5' 6\")   Wt 111 kg (245 lb)   BMI 39.54 kg/m²      Physical Exam  Constitutional:       Appearance: Normal appearance. She is obese.   HENT:      Head: Normocephalic.      Comments: Left crani scar noted and healing well. TARA, one stitch sticking out of occipital region of head     Right Ear: External ear normal.      Left Ear: External ear normal.      Nose: Nose normal.      Mouth/Throat:      Mouth: Mucous membranes are moist.   Eyes:      Extraocular Movements: Extraocular movements intact.      Conjunctiva/sclera: Conjunctivae normal.      Pupils: Pupils are equal, round, and reactive to light.   Cardiovascular:      Rate and Rhythm: Normal rate and regular rhythm.      Pulses: Normal pulses.      Heart sounds: Normal heart sounds.   Pulmonary:      Effort: Pulmonary effort is normal.      Breath sounds: Normal breath sounds.   Abdominal:      General: Bowel sounds are normal. There is no distension.      Palpations: Abdomen is soft.      Tenderness: There is no abdominal tenderness.   Genitourinary:     Comments: Not examined  Musculoskeletal:         General: Normal range of motion.      Cervical back: Normal range of motion and neck supple.      Right lower leg: Edema present.      Left lower leg: Edema present.      Comments: Generalized weakness, right sided    Skin:     General: Skin is warm and dry.      Capillary Refill: Capillary refill takes less than 2 seconds.   Neurological:      General: No focal deficit present.      Mental Status: She is alert and oriented to person, " place, and time. Mental status is at baseline.      Motor: Weakness present.      Comments: Right sided upper and lower weakness, power grade 0/5   Psychiatric:         Mood and Affect: Mood normal.         Behavior: Behavior normal.         Thought Content: Thought content normal.         Judgment: Judgment normal.          Living will related issues reviewed-CODE STATUS: Full code    DISCHARGE PLANNING: no date at this time  Discharge Home when goals are met.   Follow up with PCP in 1-2 weeks after discharge from facility.   C to follow after discharge for continued PT/OT and Nursing.    RECENT HOSPITALIZATION DATES:   All laboratories, diagnostic tests, progress notes, and consultation notes reviewed from recent hospitalization.     LABORATORIES OR DIAGNOSTIC TESTS DONE/REVIEWED IN FACILITY:  Pending 12/8/23    MEDICATIONS REVIEWED AT THE FACILITY:  Singulair  Atenolol & Chlorthalidone  Trelegy Ellipta Inhalation Aerosol Powder Breath Activated 200-62.5-25 MCG/ACT (Fluticasone-Umeclidinium-Vilanterol)  Assessment/Plan    Problem List Items Addressed This Visit       Brain mass - Primary    HTN (hypertension)    Severe asthma    Status post brain surgery    Flaccid hemiplegia affecting right dominant side (CMS/HCC)       Skin Integrity:  Nursing to monitor skin integrity as patient is at risk for pressure injuries.  Wound care per nursing  Left crani - TARA, remove stitch  See Facility notes for measurements/assessments  Turn and reposition Q 2 hours or more  Air mattress and when up in chair cushion reducing device  Dietician to evaluate and recommend:  Nutritional supplement:  Please monitor skin integrity and other pressure areas  Referral to wound clinic if appropriate:    PLAN:   Recent nursing evaluation and notes were reviewed.   Overall, patient is stable despite his/her chronic conditions.    #Meningioma, s/p craniectomy, post op right hemiparesis, Weakness and physical deconditioning: PT/OT/ST to  maximize strength, function, and endurance all while maintaining safety.   DVT-prophylaxis: Heparin TID  levETIRAcetam Oral Tablet 500 MG (Levetiracetam) BID (Seizure prophylaxis)  #HTN: Atenolol-Chlorthalidone Oral Tablet 50-25 MG daily, Zestril Oral Tablet 5 MG daily  #Asthma: Montelukast Sodium Oral Tablet 10 MG at bedtime, Trelegy Ellipta Inhalation Aerosol Powder Breath Activated 200-62.5-25 MCG/ACT one puff daily, Proventil HFA Inhalation Aerosol Solution 108 (90 Base) MCG/ACT 2 puffs Q6hr PRN, Budesonide Inhalation Suspension 1 MG/2ML BID, Flonase daily  Any decline or change in condition needs to be communicated with the physician or myself.    Discussion with nursing staff regarding ongoing care and management.  If needed, would communicate with family who are not present at this time.   There are no concerns at this time.    We will continue with the medications noted above.    We will continue to follow the patient here at the facility.    *Please note that nursing facility, outside laboratory agency, and [facility] do not interface.     Completion of the note was done through Dragon voice recognition technology and may include   unintended or grammatical errors which may not have been recognized when finalizing the note.     Time: I spent 45 minutes or greater with the patient. Greater than 50% of this time was spent in counseling and or coordination of care. The time includes prep time of reviewing vital signs, report from direct nursing staff and or therapists, hospital documentation, reviewing labs, radiographs, diagnostic tests and or consultations, time directly spent with the patient interviewing, examining, and education regarding diagnosis, treatments, and medications, as well as documentation in the electronic medical record, and reviewing the plan of care and any new orders with the patient, nursing staff and other staff directly related to the patients care.       Abril Wagner, APRN-CNP

## 2023-12-15 ENCOUNTER — NURSING HOME VISIT (OUTPATIENT)
Dept: POST ACUTE CARE | Facility: EXTERNAL LOCATION | Age: 48
End: 2023-12-15
Payer: COMMERCIAL

## 2023-12-15 DIAGNOSIS — G81.01 FLACCID HEMIPLEGIA OF RIGHT DOMINANT SIDE DUE TO NONCEREBROVASCULAR ETIOLOGY (MULTI): ICD-10-CM

## 2023-12-15 DIAGNOSIS — G93.89 BRAIN MASS: Primary | ICD-10-CM

## 2023-12-15 DIAGNOSIS — R60.0 EDEMA OF BOTH LOWER EXTREMITIES: ICD-10-CM

## 2023-12-15 DIAGNOSIS — R53.81 PHYSICAL DECONDITIONING: ICD-10-CM

## 2023-12-15 DIAGNOSIS — Z98.890 STATUS POST BRAIN SURGERY: ICD-10-CM

## 2023-12-15 PROCEDURE — 99309 SBSQ NF CARE MODERATE MDM 30: CPT | Performed by: NURSE PRACTITIONER

## 2023-12-15 NOTE — LETTER
Patient: Ariane Chavis  : 1975    Encounter Date: 12/15/2023    Name: Ariane Chavis  YOB: 1975    FOLLOW UP VISIT: SNF, Meningioma s/p craniectomy     SUBJECTIVE:  The patient is sitting up in her room in the bedside chair. She appears comfortable and in no acute distress. On  - Nursing called to report that patient was concerned about the nonpitting edema in lower extremities. She was given 5 days of Lasix from  - 23 for edema of lower legs with no improvement. Instructed nurse to order Venous duplex bilaterally and to encourage the patient to move more and elevate when resting.   Venous duplex reviewed and both negative for DVT. Discussed w/patient. The patient reports that she is participating in therapy but not much feeling has come back to arm or leg. She has slight movement to her right fingers. She denies CP or SOB. She denies Bowel/bladder issues.     REVIEW OF SYSTEMS:   All review of systems are negative unless otherwise stated above under subjective.    LABS REVIEWED AT FACILITY:  23  CMP-COMPREHENSIVE METABOLIC PNL KATIE  GLUCOSE 98 mg/dL  GLUCOSE, FASTING 65-99 mg/dL  GLUCOSE, NON-FASTING  mg/dL  SODIUM 136 136-145 mEq/L  POTASSIUM 4.1 3.5-5.3 mEq/L  CHLORIDE 99  mEq/L  CARBON DIOXIDE (CO2) 25 21-33 mEq/L  BUN (UREA NITROGEN) 20 7-25 mg/dL  CREATININE 0.7 0.6-1.2 mg/dL  ~ BUN/CREATININE RATIO 29 H 6-22  GFR-AFRICAN AMERICAN 107 >60 mL/min/1.73 m2  GFR-NON-AFRICAN AMERICAN 89 >60 mL/min/1.73 m2   Stage of CKD eGFR (mL/min/1.73 square meters)   Stage 1 >/= 90 or > 90   Stage 2 60 - 89   Stage 3 30 - 59   Stage 4 15 - 29   Stage 5 </= 14 or < 15  ** GFR is reliable for adults 17 to 69 years with stable kidney   function.  CALCIUM 8.8 8.4-10.2 mg/dL  ~ PROTEIN, TOTAL 5.8 L 6.0-8.3 g/dL  ~ ALBUMIN 3.3 L 3.5-5.5 g/dL  A/G RATIO 1.3 1.0-2.3  ALKALINE PHOS 76  IU/L  AST (SGOT) 9 4-40 IU/L  ALT (SGPT) 13 4-55 IU/L  BILIRUBIN, TOTAL 0.2 0.2-1.2 mg/dL  CBC  "W/DIFF KATIE  WBC 6.1 4.5-10.8 K/cmm  ~ RBC 3.55 L 3.90-5.40 M/cmm  ~ HEMOGLOBIN 11.3 L 12.0-16.0 g/dL  ~ HEMATOCRIT 34.0 L 36.0-48.0 %  MCV 95.6 80.0-100.0 fL  MCH 31.8 26.0-35.0 pg  MCHC 33.2 31.0-36.5 g/dL  RDW 14.8 11.0-16.0 %  PLATELET 348 150-450 K/cmm  MPV 7.9 6.5-12.0 fL  NEUTROPHILS 49.2 40.0-80.0 %  LYMPHS 39.3 13.0-48.0 %  MONOCYTES 6.7 2.0-12.0 %  EOS 3.6 0.0-8.0 %  BASO 1.2 0.0-2.0 %  NEUTS (ABSOLUTE) 3.00 1.50-7.60 K/uL  LYMPHS (ABSOLUTE) 2.40 0.90-5.50 K/uL  MONOCYTES (ABSOLUTE) 0.40 0.15-1.10 K/uL  EOS (ABSOLUTE) 0.20 0.20-0.80 K/uL  BASO (ABSOLUTE) 0.10 0.00-0.30 K/uL  NUCLEATED RBC 0.3 <1.0 NRBC/100 WBC  DEPAKOTE KATIE  ~ VALPROIC ACID <10 L  ug/mL    MEDICATIONS REVIEWED AT FACILITY:  No changes with meds    Living will related issues reviewed-Code status: Full code    OBJECTIVE:  BP 85/62   Pulse 82   Temp 36.7 °C (98.1 °F)   Resp 18   Ht 1.676 m (5' 6\")   Wt 111 kg (244 lb)   SpO2 97%   BMI 39.38 kg/m²   Physical Exam  Constitutional:       Appearance: Normal appearance. She is obese.   HENT:      Head: Normocephalic.      Comments: Left crani scar noted and healing well. TARA, one stitch sticking out of occipital region of head     Right Ear: External ear normal.      Left Ear: External ear normal.      Nose: Nose normal.      Mouth/Throat:      Mouth: Mucous membranes are moist.   Eyes:      Extraocular Movements: Extraocular movements intact.      Conjunctiva/sclera: Conjunctivae normal.      Pupils: Pupils are equal, round, and reactive to light.   Cardiovascular:      Rate and Rhythm: Normal rate and regular rhythm.      Pulses: Normal pulses.      Heart sounds: Normal heart sounds.   Pulmonary:      Effort: Pulmonary effort is normal.      Breath sounds: Normal breath sounds.   Abdominal:      General: Bowel sounds are normal. There is no distension.      Palpations: Abdomen is soft.      Tenderness: There is no abdominal tenderness.   Genitourinary:     Comments: Not " examined  Musculoskeletal:         General: Normal range of motion.      Cervical back: Normal range of motion and neck supple.      Right lower leg: Edema present. Non pitting      Left lower leg: Edema present.  Non pitting.     Comments: Generalized weakness, right sided    Skin:     General: Skin is warm and dry.      Capillary Refill: Capillary refill takes less than 2 seconds.   Neurological:      General: No focal deficit present.      Mental Status: She is alert and oriented to person, place, and time. Mental status is at baseline.      Motor: Weakness present.      Comments: Right sided upper and lower weakness, power grade 0/5   Psychiatric:         Mood and Affect: Mood normal.         Behavior: Behavior normal.         Thought Content: Thought content normal.         Judgment: Judgment normal.     Assessment/Plan  Problem List Items Addressed This Visit       Brain mass - Primary    Status post brain surgery    Flaccid hemiplegia affecting right dominant side (CMS/HCC)    Edema of both lower extremities    Physical deconditioning       Skin integrity:  Nursing to monitor skin integrity as patient is at risk for pressure injuries.  Wound care per nursing  Left crani - healed  See Facility notes for measurements/assessments  Turn and reposition Q 2 hours or more  Air mattress and when up in chair cushion reducing device  Dietician to evaluate and recommend:  Nutritional supplement:  Please monitor skin integrity and other pressure areas  Referral to wound clinic if appropriate:    PLAN:  Pt has been seen for follow up visit.  The patient is here at facility for PT/OT/ST to maximize strength, function, endurance and safety.  The patient is participating in therapy. Ariane Chavis is making progress to the best of his/her ability.   Please see PT/OT/ST notes in the facility for detailed information regarding progression of patients progress.   Recent nursing evaluation and notes were reviewed.   Overall,  patient is stable despite his/her chronic conditions.    #Meningioma, s/p craniectomy, post op right hemiparesis:  DVT-prophylaxis: Heparin TID  levETIRAcetam Oral Tablet 500 MG - (Levetiracetam) BID (Seizure prophylaxis) - level checked on 12/8  MRI scheduled for 12/26 and f/up w/Neurosurgery  on 1/3/24  #HTN: Atenolol-Chlorthalidone Oral Tablet 50-25 MG daily, Zestril Oral Tablet 5 MG daily, BP readings on lower side. This could be from the recent 5 days of Lasix that was ordered for edema. Parameters in place.   #Edema to lower legs: non pitting. Most likely from inactivity and keeping legs dependent. Venous duplex negative. Weight on admission 245# and 12/14 - 244#  #Asthma: Resp status is stable  Any decline or change in condition needs to be communicated with the physician or myself.    Discussion with nursing staff regarding ongoing care and management.  If needed, would communicate with family who are not present at this time.   There are no concerns at this time.  We will continue with the medications noted above.    We will continue to follow the patient here at the facility.    Discharge planning:   Patient will be discharge home when goals are met.   Patient will need a follow up with PCP in 1-2 weeks after discharge from facility.   If desired and in agreement, C to follow after discharge from the facility for continued PT/OT and Nursing.    *Please note that nursing facility, outside laboratory agency, and  [facility] do not interface.     Completion of the note was done through Dragon voice recognition technology and may include   unintended or grammatical errors which may not have been recognized when finalizing the note.     Time:    LAUREN Garza      Electronically Signed By: LAUREN Garza   12/19/23  7:08 PM

## 2023-12-18 ENCOUNTER — APPOINTMENT (OUTPATIENT)
Dept: PRIMARY CARE | Facility: CLINIC | Age: 48
End: 2023-12-18
Payer: COMMERCIAL

## 2023-12-19 ENCOUNTER — NURSING HOME VISIT (OUTPATIENT)
Dept: POST ACUTE CARE | Facility: EXTERNAL LOCATION | Age: 48
End: 2023-12-19
Payer: COMMERCIAL

## 2023-12-19 VITALS
RESPIRATION RATE: 18 BRPM | WEIGHT: 225 LBS | BODY MASS INDEX: 36.16 KG/M2 | TEMPERATURE: 97.8 F | HEART RATE: 80 BPM | OXYGEN SATURATION: 97 % | SYSTOLIC BLOOD PRESSURE: 98 MMHG | DIASTOLIC BLOOD PRESSURE: 70 MMHG | HEIGHT: 66 IN

## 2023-12-19 VITALS
RESPIRATION RATE: 18 BRPM | HEART RATE: 82 BPM | OXYGEN SATURATION: 97 % | DIASTOLIC BLOOD PRESSURE: 62 MMHG | SYSTOLIC BLOOD PRESSURE: 85 MMHG | BODY MASS INDEX: 39.21 KG/M2 | TEMPERATURE: 98.1 F | WEIGHT: 244 LBS | HEIGHT: 66 IN

## 2023-12-19 DIAGNOSIS — R53.81 PHYSICAL DECONDITIONING: ICD-10-CM

## 2023-12-19 DIAGNOSIS — R60.0 EDEMA OF BOTH LOWER EXTREMITIES: ICD-10-CM

## 2023-12-19 DIAGNOSIS — I10 PRIMARY HYPERTENSION: ICD-10-CM

## 2023-12-19 DIAGNOSIS — G93.89 BRAIN MASS: ICD-10-CM

## 2023-12-19 DIAGNOSIS — J45.909 SEVERE ASTHMA, UNSPECIFIED WHETHER COMPLICATED, UNSPECIFIED WHETHER PERSISTENT (HHS-HCC): ICD-10-CM

## 2023-12-19 DIAGNOSIS — E66.09 OBESITY DUE TO EXCESS CALORIES WITH SERIOUS COMORBIDITY, UNSPECIFIED CLASSIFICATION: ICD-10-CM

## 2023-12-19 DIAGNOSIS — G81.01 FLACCID HEMIPLEGIA OF RIGHT DOMINANT SIDE DUE TO NONCEREBROVASCULAR ETIOLOGY (MULTI): Primary | ICD-10-CM

## 2023-12-19 DIAGNOSIS — Z98.890 STATUS POST BRAIN SURGERY: ICD-10-CM

## 2023-12-19 PROCEDURE — 99309 SBSQ NF CARE MODERATE MDM 30: CPT | Performed by: NURSE PRACTITIONER

## 2023-12-19 NOTE — PROGRESS NOTES
Name: Ariane Chavis  YOB: 1975    FOLLOW UP VISIT: SNF, Meningioma s/p craniectomy     SUBJECTIVE:  The patient is sitting up in her room in the bedside chair. She appears comfortable and in no acute distress. On 12/13 - Nursing called to report that patient was concerned about the nonpitting edema in lower extremities. She was given 5 days of Lasix from 12/6 - 12/11/23 for edema of lower legs with no improvement. Instructed nurse to order Venous duplex bilaterally and to encourage the patient to move more and elevate when resting.   Venous duplex reviewed and both negative for DVT. Discussed w/patient. The patient reports that she is participating in therapy but not much feeling has come back to arm or leg. She has slight movement to her right fingers. She denies CP or SOB. She denies Bowel/bladder issues.     REVIEW OF SYSTEMS:   All review of systems are negative unless otherwise stated above under subjective.    LABS REVIEWED AT FACILITY:  12/8/23  CMP-COMPREHENSIVE METABOLIC PNL KATIE  GLUCOSE 98 mg/dL  GLUCOSE, FASTING 65-99 mg/dL  GLUCOSE, NON-FASTING  mg/dL  SODIUM 136 136-145 mEq/L  POTASSIUM 4.1 3.5-5.3 mEq/L  CHLORIDE 99  mEq/L  CARBON DIOXIDE (CO2) 25 21-33 mEq/L  BUN (UREA NITROGEN) 20 7-25 mg/dL  CREATININE 0.7 0.6-1.2 mg/dL  ~ BUN/CREATININE RATIO 29 H 6-22  GFR-AFRICAN AMERICAN 107 >60 mL/min/1.73 m2  GFR-NON-AFRICAN AMERICAN 89 >60 mL/min/1.73 m2   Stage of CKD eGFR (mL/min/1.73 square meters)   Stage 1 >/= 90 or > 90   Stage 2 60 - 89   Stage 3 30 - 59   Stage 4 15 - 29   Stage 5 </= 14 or < 15  ** GFR is reliable for adults 17 to 69 years with stable kidney   function.  CALCIUM 8.8 8.4-10.2 mg/dL  ~ PROTEIN, TOTAL 5.8 L 6.0-8.3 g/dL  ~ ALBUMIN 3.3 L 3.5-5.5 g/dL  A/G RATIO 1.3 1.0-2.3  ALKALINE PHOS 76  IU/L  AST (SGOT) 9 4-40 IU/L  ALT (SGPT) 13 4-55 IU/L  BILIRUBIN, TOTAL 0.2 0.2-1.2 mg/dL  CBC W/DIFF KATIE  WBC 6.1 4.5-10.8 K/cmm  ~ RBC 3.55 L 3.90-5.40 M/cmm  ~  "HEMOGLOBIN 11.3 L 12.0-16.0 g/dL  ~ HEMATOCRIT 34.0 L 36.0-48.0 %  MCV 95.6 80.0-100.0 fL  MCH 31.8 26.0-35.0 pg  MCHC 33.2 31.0-36.5 g/dL  RDW 14.8 11.0-16.0 %  PLATELET 348 150-450 K/cmm  MPV 7.9 6.5-12.0 fL  NEUTROPHILS 49.2 40.0-80.0 %  LYMPHS 39.3 13.0-48.0 %  MONOCYTES 6.7 2.0-12.0 %  EOS 3.6 0.0-8.0 %  BASO 1.2 0.0-2.0 %  NEUTS (ABSOLUTE) 3.00 1.50-7.60 K/uL  LYMPHS (ABSOLUTE) 2.40 0.90-5.50 K/uL  MONOCYTES (ABSOLUTE) 0.40 0.15-1.10 K/uL  EOS (ABSOLUTE) 0.20 0.20-0.80 K/uL  BASO (ABSOLUTE) 0.10 0.00-0.30 K/uL  NUCLEATED RBC 0.3 <1.0 NRBC/100 WBC  DEPAKOTE KATIE  ~ VALPROIC ACID <10 L  ug/mL    MEDICATIONS REVIEWED AT FACILITY:  No changes with meds    Living will related issues reviewed-Code status: Full code    OBJECTIVE:  BP 85/62   Pulse 82   Temp 36.7 °C (98.1 °F)   Resp 18   Ht 1.676 m (5' 6\")   Wt 111 kg (244 lb)   SpO2 97%   BMI 39.38 kg/m²   Physical Exam  Constitutional:       Appearance: Normal appearance. She is obese.   HENT:      Head: Normocephalic.      Comments: Left crani scar noted and healing well. TARA, one stitch sticking out of occipital region of head     Right Ear: External ear normal.      Left Ear: External ear normal.      Nose: Nose normal.      Mouth/Throat:      Mouth: Mucous membranes are moist.   Eyes:      Extraocular Movements: Extraocular movements intact.      Conjunctiva/sclera: Conjunctivae normal.      Pupils: Pupils are equal, round, and reactive to light.   Cardiovascular:      Rate and Rhythm: Normal rate and regular rhythm.      Pulses: Normal pulses.      Heart sounds: Normal heart sounds.   Pulmonary:      Effort: Pulmonary effort is normal.      Breath sounds: Normal breath sounds.   Abdominal:      General: Bowel sounds are normal. There is no distension.      Palpations: Abdomen is soft.      Tenderness: There is no abdominal tenderness.   Genitourinary:     Comments: Not examined  Musculoskeletal:         General: Normal range of motion.      Cervical " back: Normal range of motion and neck supple.      Right lower leg: Edema present. Non pitting      Left lower leg: Edema present.  Non pitting.     Comments: Generalized weakness, right sided    Skin:     General: Skin is warm and dry.      Capillary Refill: Capillary refill takes less than 2 seconds.   Neurological:      General: No focal deficit present.      Mental Status: She is alert and oriented to person, place, and time. Mental status is at baseline.      Motor: Weakness present.      Comments: Right sided upper and lower weakness, power grade 0/5   Psychiatric:         Mood and Affect: Mood normal.         Behavior: Behavior normal.         Thought Content: Thought content normal.         Judgment: Judgment normal.     Assessment/Plan   Problem List Items Addressed This Visit       Brain mass - Primary    Status post brain surgery    Flaccid hemiplegia affecting right dominant side (CMS/HCC)    Edema of both lower extremities    Physical deconditioning       Skin integrity:  Nursing to monitor skin integrity as patient is at risk for pressure injuries.  Wound care per nursing  Left crani - healed  See Facility notes for measurements/assessments  Turn and reposition Q 2 hours or more  Air mattress and when up in chair cushion reducing device  Dietician to evaluate and recommend:  Nutritional supplement:  Please monitor skin integrity and other pressure areas  Referral to wound clinic if appropriate:    PLAN:  Pt has been seen for follow up visit.  The patient is here at facility for PT/OT/ST to maximize strength, function, endurance and safety.  The patient is participating in therapy. Ariane Chavis is making progress to the best of his/her ability.   Please see PT/OT/ST notes in the facility for detailed information regarding progression of patients progress.   Recent nursing evaluation and notes were reviewed.   Overall, patient is stable despite his/her chronic conditions.    #Meningioma, s/p  craniectomy, post op right hemiparesis:  DVT-prophylaxis: Heparin TID  levETIRAcetam Oral Tablet 500 MG - (Levetiracetam) BID (Seizure prophylaxis) - level checked on 12/8  MRI scheduled for 12/26 and f/up w/Neurosurgery  on 1/3/24  #HTN: Atenolol-Chlorthalidone Oral Tablet 50-25 MG daily, Zestril Oral Tablet 5 MG daily, BP readings on lower side. This could be from the recent 5 days of Lasix that was ordered for edema. Parameters in place.   #Edema to lower legs: non pitting. Most likely from inactivity and keeping legs dependent. Venous duplex negative. Weight on admission 245# and 12/14 - 244#  #Asthma: Resp status is stable  Any decline or change in condition needs to be communicated with the physician or myself.    Discussion with nursing staff regarding ongoing care and management.  If needed, would communicate with family who are not present at this time.   There are no concerns at this time.  We will continue with the medications noted above.    We will continue to follow the patient here at the facility.    Discharge planning:   Patient will be discharge home when goals are met.   Patient will need a follow up with PCP in 1-2 weeks after discharge from facility.   If desired and in agreement, [facility] to follow after discharge from the facility for continued PT/OT and Nursing.    *Please note that nursing facility, outside laboratory agency, and  [facility] do not interface.     Completion of the note was done through Dragon voice recognition technology and may include   unintended or grammatical errors which may not have been recognized when finalizing the note.     Time:    Abril Wagner, APRN-CNP

## 2023-12-19 NOTE — LETTER
"Patient: Ariane Chavis  : 1975    Encounter Date: 2023    Name: Ariane Chavis  YOB: 1975    FOLLOW UP VISIT: SNF, Meningioma, s/p crani    SUBJECTIVE:  The patient is up in her room sitting on the edge of bed. She appears comfortable and in no acute distress. She is doing well. She reports that she is beginning to take some steps with x2 therapists. She is able to move her right finger tips and this is the most I have seen. We will cont Heparin til end of week making Friday last dose. She has no complaints. Denies fever, chills, CP, SOB, Abd pain, N/V, or bowel/bladder issues.    REVIEW OF SYSTEMS:   All review of systems are negative unless otherwise stated above under subjective.    LABS REVIEWED AT FACILITY:  Pending 24    MEDICATIONS REVIEWED AT FACILITY:  Heparin to be d'cd after  last dose  Decreased Atenolol and chlorthalidone 25-12.5 mg daily  Decreased Zestril 2.5 mg daily    Living will related issues reviewed-Code status: Full code    OBJECTIVE:  BP 98/70   Pulse 80   Temp 36.6 °C (97.8 °F)   Resp 18   Ht 1.676 m (5' 6\")   Wt 102 kg (225 lb)   SpO2 97%   BMI 36.32 kg/m²   Physical Exam  Constitutional:       Appearance: Normal appearance. She is obese.   HENT:      Head: Normocephalic.      Comments: Left crani scar noted and healing well. TARA, one stitch sticking out of occipital region of head     Right Ear: External ear normal.      Left Ear: External ear normal.      Nose: Nose normal.      Mouth/Throat:      Mouth: Mucous membranes are moist.   Eyes:      Extraocular Movements: Extraocular movements intact.      Conjunctiva/sclera: Conjunctivae normal.      Pupils: Pupils are equal, round, and reactive to light.   Cardiovascular:      Rate and Rhythm: Normal rate and regular rhythm.      Pulses: Normal pulses.      Heart sounds: Normal heart sounds.   Pulmonary:      Effort: Pulmonary effort is normal.      Breath sounds: Normal breath sounds.   Abdominal: "      General: Bowel sounds are normal. There is no distension.      Palpations: Abdomen is soft.      Tenderness: There is no abdominal tenderness.   Genitourinary:     Comments: Not examined  Musculoskeletal:         General: Normal range of motion.      Cervical back: Normal range of motion and neck supple.      Right lower leg: Edema present. Non pitting      Left lower leg: Edema present.  Non pitting.     Comments: Generalized weakness, right sided    Skin:     General: Skin is warm and dry.      Capillary Refill: Capillary refill takes less than 2 seconds.   Neurological:      General: No focal deficit present.      Mental Status: She is alert and oriented to person, place, and time. Mental status is at baseline.      Motor: Weakness present.      Comments: Right sided upper and lower weakness, power grade 0/5 - right fingers movement   Psychiatric:         Mood and Affect: Mood normal.         Behavior: Behavior normal.         Thought Content: Thought content normal.         Judgment: Judgment normal.     Assessment/Plan  Problem List Items Addressed This Visit       Brain mass    HTN (hypertension)    Obesity    Severe asthma    Status post brain surgery    Flaccid hemiplegia affecting right dominant side (CMS/HCC) - Primary    Edema of both lower extremities    Physical deconditioning       Skin integrity:  Nursing to monitor skin integrity as patient is at risk for pressure injuries.  Wound care per nursing  Left side of head crani - healed  See Facility notes for measurements/assessments  Turn and reposition Q 2 hours or more  Air mattress and when up in chair cushion reducing device  Dietician to evaluate and recommend:  Nutritional supplement:  Please monitor skin integrity and other pressure areas  Referral to wound clinic if appropriate:    PLAN:  Pt has been seen for follow up visit.  The patient is here at facility for PT/OT/ST to maximize strength, function, endurance and safety.  The patient is  participating in therapy. Ariane Chavis is making progress to the best of his/her ability.   Please see PT/OT/ST notes in the facility for detailed information regarding progression of patients progress.   Recent nursing evaluation and notes were reviewed.   Overall, patient is stable despite his/her chronic conditions.    #Meningioma, s/p craniectomy, post op right hemiparesis:  DVT-prophylaxis: Heparin TID will complete this 12/22  levETIRAcetam Oral Tablet 500 MG - (Levetiracetam) BID (Seizure prophylaxis) - level checked on 12/8, no seizure activity  MRI scheduled for 12/26 and f/up w/Neurosurgery  on 1/3/24  #HTN: BP readings 90/70's. Will decrease Atenolol-Chlorthalidone Oral Tablet 50-25 MG to 1/2 tab daily, decrease Zestril Oral Tablet to 2.5 MG daily,   #Edema to lower legs: non pitting. Improving. Weight on admission 245# and 12/14 - 244#, weight 12/18 down to #225.  #Asthma: Resp status is stable  Any decline or change in condition needs to be communicated with the physician or myself.    Discussion with nursing staff regarding ongoing care and management.  If needed, would communicate with family who are not present at this time.   There are no concerns at this time.  We will continue with the medications noted above.    We will continue to follow the patient here at the facility.    Discharge planning: no date at this time  Patient will be discharge home when goals are met.   Patient will need a follow up with PCP in 1-2 weeks after discharge from facility.   If desired and in agreement, [facility] to follow after discharge from the facility for continued PT/OT and Nursing.    *Please note that nursing facility, outside laboratory agency, and  [facility] do not interface.     Completion of the note was done through Dragon voice recognition technology and may include   unintended or grammatical errors which may not have been recognized when finalizing the note.     Time:    Abril Wagner  CULLEN-CNP      Electronically Signed By: LAUREN Garza   12/27/23  4:28 PM

## 2023-12-20 NOTE — PROGRESS NOTES
"Name: Ariane Chavis  YOB: 1975    FOLLOW UP VISIT: SNF, Meningioma, s/p crani    SUBJECTIVE:  The patient is up in her room sitting on the edge of bed. She appears comfortable and in no acute distress. She is doing well. She reports that she is beginning to take some steps with x2 therapists. She is able to move her right finger tips and this is the most I have seen. We will cont Heparin til end of week making Friday last dose. She has no complaints. Denies fever, chills, CP, SOB, Abd pain, N/V, or bowel/bladder issues.    REVIEW OF SYSTEMS:   All review of systems are negative unless otherwise stated above under subjective.    LABS REVIEWED AT FACILITY:  Pending 1/5/24    MEDICATIONS REVIEWED AT FACILITY:  Heparin to be d'cd after Fridays last dose  Decreased Atenolol and chlorthalidone 25-12.5 mg daily  Decreased Zestril 2.5 mg daily    Living will related issues reviewed-Code status: Full code    OBJECTIVE:  BP 98/70   Pulse 80   Temp 36.6 °C (97.8 °F)   Resp 18   Ht 1.676 m (5' 6\")   Wt 102 kg (225 lb)   SpO2 97%   BMI 36.32 kg/m²   Physical Exam  Constitutional:       Appearance: Normal appearance. She is obese.   HENT:      Head: Normocephalic.      Comments: Left crani scar noted and healing well. TARA, one stitch sticking out of occipital region of head     Right Ear: External ear normal.      Left Ear: External ear normal.      Nose: Nose normal.      Mouth/Throat:      Mouth: Mucous membranes are moist.   Eyes:      Extraocular Movements: Extraocular movements intact.      Conjunctiva/sclera: Conjunctivae normal.      Pupils: Pupils are equal, round, and reactive to light.   Cardiovascular:      Rate and Rhythm: Normal rate and regular rhythm.      Pulses: Normal pulses.      Heart sounds: Normal heart sounds.   Pulmonary:      Effort: Pulmonary effort is normal.      Breath sounds: Normal breath sounds.   Abdominal:      General: Bowel sounds are normal. There is no distension.      " Palpations: Abdomen is soft.      Tenderness: There is no abdominal tenderness.   Genitourinary:     Comments: Not examined  Musculoskeletal:         General: Normal range of motion.      Cervical back: Normal range of motion and neck supple.      Right lower leg: Edema present. Non pitting      Left lower leg: Edema present.  Non pitting.     Comments: Generalized weakness, right sided    Skin:     General: Skin is warm and dry.      Capillary Refill: Capillary refill takes less than 2 seconds.   Neurological:      General: No focal deficit present.      Mental Status: She is alert and oriented to person, place, and time. Mental status is at baseline.      Motor: Weakness present.      Comments: Right sided upper and lower weakness, power grade 0/5 - right fingers movement   Psychiatric:         Mood and Affect: Mood normal.         Behavior: Behavior normal.         Thought Content: Thought content normal.         Judgment: Judgment normal.     Assessment/Plan   Problem List Items Addressed This Visit       Brain mass    HTN (hypertension)    Obesity    Severe asthma    Status post brain surgery    Flaccid hemiplegia affecting right dominant side (CMS/HCC) - Primary    Edema of both lower extremities    Physical deconditioning       Skin integrity:  Nursing to monitor skin integrity as patient is at risk for pressure injuries.  Wound care per nursing  Left side of head crani - healed  See Facility notes for measurements/assessments  Turn and reposition Q 2 hours or more  Air mattress and when up in chair cushion reducing device  Dietician to evaluate and recommend:  Nutritional supplement:  Please monitor skin integrity and other pressure areas  Referral to wound clinic if appropriate:    PLAN:  Pt has been seen for follow up visit.  The patient is here at facility for PT/OT/ST to maximize strength, function, endurance and safety.  The patient is participating in therapy. Ariane Chavis is making progress to the  best of his/her ability.   Please see PT/OT/ST notes in the facility for detailed information regarding progression of patients progress.   Recent nursing evaluation and notes were reviewed.   Overall, patient is stable despite his/her chronic conditions.    #Meningioma, s/p craniectomy, post op right hemiparesis:  DVT-prophylaxis: Heparin TID will complete this 12/22  levETIRAcetam Oral Tablet 500 MG - (Levetiracetam) BID (Seizure prophylaxis) - level checked on 12/8, no seizure activity  MRI scheduled for 12/26 and f/up w/Neurosurgery  on 1/3/24  #HTN: BP readings 90/70's. Will decrease Atenolol-Chlorthalidone Oral Tablet 50-25 MG to 1/2 tab daily, decrease Zestril Oral Tablet to 2.5 MG daily,   #Edema to lower legs: non pitting. Improving. Weight on admission 245# and 12/14 - 244#, weight 12/18 down to #225.  #Asthma: Resp status is stable  Any decline or change in condition needs to be communicated with the physician or myself.    Discussion with nursing staff regarding ongoing care and management.  If needed, would communicate with family who are not present at this time.   There are no concerns at this time.  We will continue with the medications noted above.    We will continue to follow the patient here at the facility.    Discharge planning: no date at this time  Patient will be discharge home when goals are met.   Patient will need a follow up with PCP in 1-2 weeks after discharge from facility.   If desired and in agreement, [facility] to follow after discharge from the facility for continued PT/OT and Nursing.    *Please note that nursing facility, outside laboratory agency, and  [facility] do not interface.     Completion of the note was done through Dragon voice recognition technology and may include   unintended or grammatical errors which may not have been recognized when finalizing the note.     Time:    CULLEN Garza-CNP

## 2023-12-26 ENCOUNTER — HOSPITAL ENCOUNTER (OUTPATIENT)
Dept: RADIOLOGY | Facility: HOSPITAL | Age: 48
Discharge: HOME | End: 2023-12-26
Payer: COMMERCIAL

## 2023-12-26 DIAGNOSIS — G93.89 BRAIN MASS: ICD-10-CM

## 2023-12-26 PROCEDURE — 70553 MRI BRAIN STEM W/O & W/DYE: CPT | Performed by: RADIOLOGY

## 2023-12-26 PROCEDURE — A9575 INJ GADOTERATE MEGLUMI 0.1ML: HCPCS | Performed by: NEUROLOGICAL SURGERY

## 2023-12-26 PROCEDURE — 70553 MRI BRAIN STEM W/O & W/DYE: CPT

## 2023-12-26 PROCEDURE — 2550000001 HC RX 255 CONTRASTS: Performed by: NEUROLOGICAL SURGERY

## 2023-12-26 RX ORDER — GADOTERATE MEGLUMINE 376.9 MG/ML
0.2 INJECTION INTRAVENOUS
Status: COMPLETED | OUTPATIENT
Start: 2023-12-26 | End: 2023-12-26

## 2023-12-26 RX ADMIN — GADOTERATE MEGLUMINE 20 ML: 376.9 INJECTION INTRAVENOUS at 17:19

## 2023-12-29 ENCOUNTER — NURSING HOME VISIT (OUTPATIENT)
Dept: POST ACUTE CARE | Facility: EXTERNAL LOCATION | Age: 48
End: 2023-12-29
Payer: COMMERCIAL

## 2023-12-29 DIAGNOSIS — R53.81 PHYSICAL DECONDITIONING: ICD-10-CM

## 2023-12-29 DIAGNOSIS — G93.89 BRAIN MASS: ICD-10-CM

## 2023-12-29 DIAGNOSIS — G81.01 FLACCID HEMIPLEGIA OF RIGHT DOMINANT SIDE DUE TO NONCEREBROVASCULAR ETIOLOGY (MULTI): ICD-10-CM

## 2023-12-29 DIAGNOSIS — R60.0 EDEMA OF BOTH LOWER EXTREMITIES: ICD-10-CM

## 2023-12-29 DIAGNOSIS — I10 PRIMARY HYPERTENSION: Primary | ICD-10-CM

## 2023-12-29 DIAGNOSIS — Z98.890 STATUS POST BRAIN SURGERY: ICD-10-CM

## 2023-12-29 DIAGNOSIS — E66.09 OBESITY DUE TO EXCESS CALORIES WITH SERIOUS COMORBIDITY, UNSPECIFIED CLASSIFICATION: ICD-10-CM

## 2023-12-29 PROCEDURE — 99309 SBSQ NF CARE MODERATE MDM 30: CPT | Performed by: NURSE PRACTITIONER

## 2023-12-29 NOTE — LETTER
"Patient: Ariane Chavis  : 1975    Encounter Date: 2023    Name: Ariane Chavis  YOB: 1975    FOLLOW UP VISIT: SNF, movement in right arm and hands     SUBJECTIVE:  The patient is up in her room with  at the bedside. She is doing well and is having movement in right upper arm. She denies any SOB or CP. She is walking w/assistance of two therapists. Still has some weakness to the lower right leg 1/5 strength.     REVIEW OF SYSTEMS:   All review of systems are negative unless otherwise stated above under subjective.    LABS REVIEWED AT FACILITY:  No labs pending    MEDICATIONS REVIEWED AT FACILITY:  No new meds or initiation of new meds    Living will related issues reviewed-Code status: Full code    OBJECTIVE:  /86   Pulse 96   Temp 36.9 °C (98.4 °F)   Resp 16   Ht 1.676 m (5' 6\")   Wt 108 kg (238 lb 8 oz)   SpO2 97%   BMI 38.49 kg/m²     Physical Exam  Constitutional:       Appearance: Normal appearance. She is obese.   HENT:      Head: Normocephalic.      Comments: Left crani scar noted and healing well. TARA, one stitch sticking out of occipital region of head     Right Ear: External ear normal.      Left Ear: External ear normal.      Nose: Nose normal.      Mouth/Throat:      Mouth: Mucous membranes are moist.   Eyes:      Extraocular Movements: Extraocular movements intact.      Conjunctiva/sclera: Conjunctivae normal.      Pupils: Pupils are equal, round, and reactive to light.   Cardiovascular:      Rate and Rhythm: Normal rate and regular rhythm.      Pulses: Normal pulses.      Heart sounds: Normal heart sounds.   Pulmonary:      Effort: Pulmonary effort is normal.      Breath sounds: Normal breath sounds.   Abdominal:      General: Bowel sounds are normal. There is no distension.      Palpations: Abdomen is soft.      Tenderness: There is no abdominal tenderness.   Genitourinary:     Comments: Not examined  Musculoskeletal:         General: Normal range of " motion.      Cervical back: Normal range of motion and neck supple.      Right lower leg: Edema present. Non pitting      Left lower leg: Edema present.  Non pitting.     Comments: Generalized weakness, right sided    Skin:     General: Skin is warm and dry.      Capillary Refill: Capillary refill takes less than 2 seconds.   Neurological:      General: No focal deficit present.      Mental Status: She is alert and oriented to person, place, and time. Mental status is at baseline.      Motor: Weakness present.      Comments: Right sided upper power grade 2/5 and lower weakness, power grade 1/5   Psychiatric:         Mood and Affect: Mood normal.         Behavior: Behavior normal.         Thought Content: Thought content normal.         Judgment: Judgment normal.      Assessment/Plan  Problem List Items Addressed This Visit       Brain mass    HTN (hypertension) - Primary    Obesity    Status post brain surgery    Flaccid hemiplegia affecting right dominant side (CMS/HCC)    Edema of both lower extremities    Physical deconditioning       Skin integrity:  Nursing to monitor skin integrity as patient is at risk for pressure injuries.    PLAN:  Pt has been seen for follow up visit.  The patient is here at facility for PT/OT/ST to maximize strength, function, endurance and safety.  The patient is participating in therapy. Ariane Chavis is making progress to the best of his/her ability. Movement in right arm and hands/fingers, cont to feel numb in certain areas of right side of face and behind ear, 1/5 strength in right lower leg.  Please see PT/OT/ST notes in the facility for detailed information regarding progression of patients progress.   Recent nursing evaluation and notes were reviewed.   Overall, patient is stable despite his/her chronic conditions.    #Meningioma, s/p craniectomy, post op right hemiparesis:  LevETIRAcetam Oral Tablet 500 MG - (Levetiracetam) BID (Seizure prophylaxis) - level checked on 12/8, no  seizure activity  MRI done on 12/26 and f/up w/Neurosurgery  on 1/3/24  #HTN: BP readings 140/80's. Cont meds Atenolol - Chlorthalidone and Zestril and lower dose.   #Edema to lower legs: non pitting. Improving. Weight on admission 245# and 12/14 - 244#, weight 12/18 down to #225. Weight on 12/25 238.5# weight stable 238.5# as of 12/29/23.   #Asthma: Resp status is stable  Any decline or change in condition needs to be communicated with the physician or myself.    Discussion with nursing staff regarding ongoing care and management.  If needed, would communicate with family who are not present at this time.   There are no concerns at this time.  We will continue with the medications noted above.    We will continue to follow the patient here at the facility.    Discharge planning: Possible discharge in 2 weeks  Patient will be discharge home when goals are met.   Patient will need a follow up with PCP in 1-2 weeks after discharge from facility.   If desired and in agreement, [facility] to follow after discharge from the facility for continued PT/OT and Nursing.    *Please note that nursing facility, outside laboratory agency, and  [facility] do not interface.     Completion of the note was done through Dragon voice recognition technology and may include   unintended or grammatical errors which may not have been recognized when finalizing the note.     Time:    LAUREN Garza      Electronically Signed By: LAUREN Garza   1/8/24  3:32 PM

## 2024-01-03 ENCOUNTER — OFFICE VISIT (OUTPATIENT)
Dept: NEUROSURGERY | Facility: HOSPITAL | Age: 49
End: 2024-01-03
Payer: COMMERCIAL

## 2024-01-03 VITALS
HEART RATE: 92 BPM | DIASTOLIC BLOOD PRESSURE: 73 MMHG | WEIGHT: 225 LBS | BODY MASS INDEX: 36.16 KG/M2 | HEIGHT: 66 IN | SYSTOLIC BLOOD PRESSURE: 117 MMHG | RESPIRATION RATE: 18 BRPM

## 2024-01-03 DIAGNOSIS — G93.89 BRAIN MASS: Primary | ICD-10-CM

## 2024-01-03 PROCEDURE — 3078F DIAST BP <80 MM HG: CPT | Performed by: NEUROLOGICAL SURGERY

## 2024-01-03 PROCEDURE — 3074F SYST BP LT 130 MM HG: CPT | Performed by: NEUROLOGICAL SURGERY

## 2024-01-03 PROCEDURE — 99024 POSTOP FOLLOW-UP VISIT: CPT | Performed by: NEUROLOGICAL SURGERY

## 2024-01-03 PROCEDURE — 1036F TOBACCO NON-USER: CPT | Performed by: NEUROLOGICAL SURGERY

## 2024-01-03 RX ORDER — ONDANSETRON 4 MG/1
4 TABLET, FILM COATED ORAL EVERY 8 HOURS PRN
COMMUNITY

## 2024-01-03 NOTE — PROGRESS NOTES
11-10-23 Craniotomy for meningioma. Today is feeling pretty good.  Some light headiness.  Doing physical therapy.     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  The patient has been doing inpatient rehabilitation.  She feels that her strength is returning.  She is now able to ambulate with assistance and has improved strength in her upper extremity.    On exam, the patient is alert and interactive.  Her incision is clean and intact.  She has 4 out of 5 strength distally in her right upper extremity and 3 out of 5 strength proximally.  In her lower extremity, she wiggles her toes.    New MRI of the brain that I personally reviewed demonstrates postsurgical changes.  There is a question of slight enhancement along the sinus concerning for granulation tissue versus residual tumor.  There is a new left frontal fluid collection may represent a small hematoma without significant brain compression.    The patient continues to recover clinically.  The etiology of the new left rectal fluid collection is unclear.  I would like to obtain a new noncontrasted CT of the head in 4 weeks to follow the collection.

## 2024-01-04 VITALS
TEMPERATURE: 98.4 F | SYSTOLIC BLOOD PRESSURE: 146 MMHG | OXYGEN SATURATION: 97 % | BODY MASS INDEX: 38.33 KG/M2 | DIASTOLIC BLOOD PRESSURE: 86 MMHG | HEIGHT: 66 IN | HEART RATE: 96 BPM | WEIGHT: 238.5 LBS | RESPIRATION RATE: 16 BRPM

## 2024-01-05 NOTE — PROGRESS NOTES
"Name: Ariane Chavis  YOB: 1975    FOLLOW UP VISIT: SNF, movement in right arm and hands     SUBJECTIVE:  The patient is up in her room with  at the bedside. She is doing well and is having movement in right upper arm. She denies any SOB or CP. She is walking w/assistance of two therapists. Still has some weakness to the lower right leg 1/5 strength.     REVIEW OF SYSTEMS:   All review of systems are negative unless otherwise stated above under subjective.    LABS REVIEWED AT FACILITY:  No labs pending    MEDICATIONS REVIEWED AT FACILITY:  No new meds or initiation of new meds    Living will related issues reviewed-Code status: Full code    OBJECTIVE:  /86   Pulse 96   Temp 36.9 °C (98.4 °F)   Resp 16   Ht 1.676 m (5' 6\")   Wt 108 kg (238 lb 8 oz)   SpO2 97%   BMI 38.49 kg/m²     Physical Exam  Constitutional:       Appearance: Normal appearance. She is obese.   HENT:      Head: Normocephalic.      Comments: Left crani scar noted and healing well. TARA, one stitch sticking out of occipital region of head     Right Ear: External ear normal.      Left Ear: External ear normal.      Nose: Nose normal.      Mouth/Throat:      Mouth: Mucous membranes are moist.   Eyes:      Extraocular Movements: Extraocular movements intact.      Conjunctiva/sclera: Conjunctivae normal.      Pupils: Pupils are equal, round, and reactive to light.   Cardiovascular:      Rate and Rhythm: Normal rate and regular rhythm.      Pulses: Normal pulses.      Heart sounds: Normal heart sounds.   Pulmonary:      Effort: Pulmonary effort is normal.      Breath sounds: Normal breath sounds.   Abdominal:      General: Bowel sounds are normal. There is no distension.      Palpations: Abdomen is soft.      Tenderness: There is no abdominal tenderness.   Genitourinary:     Comments: Not examined  Musculoskeletal:         General: Normal range of motion.      Cervical back: Normal range of motion and neck supple.      " Right lower leg: Edema present. Non pitting      Left lower leg: Edema present.  Non pitting.     Comments: Generalized weakness, right sided    Skin:     General: Skin is warm and dry.      Capillary Refill: Capillary refill takes less than 2 seconds.   Neurological:      General: No focal deficit present.      Mental Status: She is alert and oriented to person, place, and time. Mental status is at baseline.      Motor: Weakness present.      Comments: Right sided upper power grade 2/5 and lower weakness, power grade 1/5   Psychiatric:         Mood and Affect: Mood normal.         Behavior: Behavior normal.         Thought Content: Thought content normal.         Judgment: Judgment normal.      Assessment/Plan   Problem List Items Addressed This Visit       Brain mass    HTN (hypertension) - Primary    Obesity    Status post brain surgery    Flaccid hemiplegia affecting right dominant side (CMS/HCC)    Edema of both lower extremities    Physical deconditioning       Skin integrity:  Nursing to monitor skin integrity as patient is at risk for pressure injuries.    PLAN:  Pt has been seen for follow up visit.  The patient is here at facility for PT/OT/ST to maximize strength, function, endurance and safety.  The patient is participating in therapy. Ariane Chavis is making progress to the best of his/her ability. Movement in right arm and hands/fingers, cont to feel numb in certain areas of right side of face and behind ear, 1/5 strength in right lower leg.  Please see PT/OT/ST notes in the facility for detailed information regarding progression of patients progress.   Recent nursing evaluation and notes were reviewed.   Overall, patient is stable despite his/her chronic conditions.    #Meningioma, s/p craniectomy, post op right hemiparesis:  LevETIRAcetam Oral Tablet 500 MG - (Levetiracetam) BID (Seizure prophylaxis) - level checked on 12/8, no seizure activity  MRI done on 12/26 and f/up w/Neurosurgery  on  1/3/24  #HTN: BP readings 140/80's. Cont meds Atenolol - Chlorthalidone and Zestril and lower dose.   #Edema to lower legs: non pitting. Improving. Weight on admission 245# and 12/14 - 244#, weight 12/18 down to #225. Weight on 12/25 238.5# weight stable 238.5# as of 12/29/23.   #Asthma: Resp status is stable  Any decline or change in condition needs to be communicated with the physician or myself.    Discussion with nursing staff regarding ongoing care and management.  If needed, would communicate with family who are not present at this time.   There are no concerns at this time.  We will continue with the medications noted above.    We will continue to follow the patient here at the facility.    Discharge planning: Possible discharge in 2 weeks  Patient will be discharge home when goals are met.   Patient will need a follow up with PCP in 1-2 weeks after discharge from facility.   If desired and in agreement, [facility] to follow after discharge from the facility for continued PT/OT and Nursing.    *Please note that nursing facility, outside laboratory agency, and  [facility] do not interface.     Completion of the note was done through Dragon voice recognition technology and may include   unintended or grammatical errors which may not have been recognized when finalizing the note.     Time:    CULLEN Garza-CNP

## 2024-01-09 ENCOUNTER — NURSING HOME VISIT (OUTPATIENT)
Dept: POST ACUTE CARE | Facility: EXTERNAL LOCATION | Age: 49
End: 2024-01-09
Payer: COMMERCIAL

## 2024-01-09 VITALS
WEIGHT: 253.2 LBS | BODY MASS INDEX: 40.69 KG/M2 | RESPIRATION RATE: 18 BRPM | HEART RATE: 92 BPM | HEIGHT: 66 IN | DIASTOLIC BLOOD PRESSURE: 72 MMHG | SYSTOLIC BLOOD PRESSURE: 152 MMHG | OXYGEN SATURATION: 97 % | TEMPERATURE: 97.8 F

## 2024-01-09 DIAGNOSIS — I10 PRIMARY HYPERTENSION: ICD-10-CM

## 2024-01-09 DIAGNOSIS — Z98.890 STATUS POST BRAIN SURGERY: ICD-10-CM

## 2024-01-09 DIAGNOSIS — E66.09 OBESITY DUE TO EXCESS CALORIES WITH SERIOUS COMORBIDITY, UNSPECIFIED CLASSIFICATION: ICD-10-CM

## 2024-01-09 DIAGNOSIS — G81.01 FLACCID HEMIPLEGIA OF RIGHT DOMINANT SIDE DUE TO NONCEREBROVASCULAR ETIOLOGY (MULTI): Primary | ICD-10-CM

## 2024-01-09 DIAGNOSIS — R53.81 PHYSICAL DECONDITIONING: ICD-10-CM

## 2024-01-09 DIAGNOSIS — J45.909 SEVERE ASTHMA, UNSPECIFIED WHETHER COMPLICATED, UNSPECIFIED WHETHER PERSISTENT (HHS-HCC): ICD-10-CM

## 2024-01-09 DIAGNOSIS — G93.89 BRAIN MASS: ICD-10-CM

## 2024-01-09 PROCEDURE — 99309 SBSQ NF CARE MODERATE MDM 30: CPT | Performed by: NURSE PRACTITIONER

## 2024-01-09 NOTE — LETTER
Patient: Ariane Chavis  : 1975    Encounter Date: 2024    Name: Ariane Chavis  YOB: 1975    FOLLOW UP VISIT: SNF, post op brain surgery    SUBJECTIVE:  Patient up in her room sitting in WC. She reports to be doing well and gaining strength in right arm and some strength to lower right leg. Seen neuro on 1/3/24 and discussed plan. MRI showed post surgical changes with questions of slight enhancement along the sinus concerning for granulation tissue versus residual tumor. There is also left frontal fluid collection may represent a small hematoma w/out significant brain compression. Per neuro notes the etiology of collection of fluid is unclear. CT of head to be done in 4 weeks.   Patient denies CP or SOB. Patient was on Cipro for a short time for possible UTI, but UA C/S showed no growth and Cipro d'cd on 24.       REVIEW OF SYSTEMS:   All review of systems are negative unless otherwise stated above under subjective.    LABS REVIEWED AT FACILITY:  24  WBC 7.2  H&H 10.9/32.3  Platelets 299  CMP-COMPREHENSIVE METABOLIC PNL KATIE  ~ GLUCOSE 101 H mg/dL  GLUCOSE, FASTING 65-99 mg/dL  GLUCOSE, NON-FASTING  mg/dL  SODIUM 139 136-145 mEq/L  POTASSIUM 3.8 3.5-5.3 mEq/L  CHLORIDE 100  mEq/L  CARBON DIOXIDE (CO2) 26 21-33 mEq/L  BUN (UREA NITROGEN) 8 7-25 mg/dL  ~ CREATININE 0.4 L 0.6-1.2 mg/dL  BUN/CREATININE RATIO 20 6-22  GFR-AFRICAN AMERICAN 204 >60 mL/min/1.73 m2  GFR-NON-AFRICAN AMERICAN 169 >60 mL/min/1.73 m2   Stage of CKD eGFR (mL/min/1.73 square meters)   Stage 1 >/= 90 or > 90   Stage 2 60 - 89   Stage 3 30 - 59   Stage 4 15 - 29   Stage 5 </= 14 or < 15  ** GFR is reliable for adults 17 to 69 years with stable kidney   function.  CALCIUM 8.9 8.4-10.2 mg/dL  ~ PROTEIN, TOTAL 5.7 L 6.0-8.3 g/dL  ~ ALBUMIN 3.2 L 3.5-5.5 g/dL  A/G RATIO 1.3 1.0-2.3  ALKALINE PHOS 74  IU/L  AST (SGOT) 8 4-40 IU/L  ALT (SGPT) 11 4-55 IU/L  BILIRUBIN, TOTAL 0.3 0.2-1.2 mg/dL    24  UA  "W/CULTURE IF INDICATED KATIE   Grading for Epith Cells,Bact,Cast   NEGATIVE = 0 - 2   FEW = 3 - 10   MODERATE = 11 - 30   MANY = 31 - 60   TNTC = >61  Criteria for a Urine Culture to be reflexed are:   WBC >5/hpf   RBC >5/hpf   NITRITES =POSITIVE  COLOR LIGHT YELLOW YELLOW  CLARITY CLEAR CLEAR  GLUCOSE,UR NEGATIVE NEGATIVE  BILIRUBIN,UR NEGATIVE NEGATIVE  KETONES,UR NEGATIVE NEGATIVE  SPECIFIC GRAVITY 1.015 1.001-1.030  ~ BLOOD,UR 1+ ABN NEGATIVE  PH, URINE 5.5 5.0-8.5  PROTEIN,UR NEGATIVE NEGATIVE  UROBILINOGEN,UR NEGATIVE NEGATIVE  NITRITE,UR NEGATIVE NEGATIVE  ~ LEUKOCYTES,UR 4+ ABN NEGATIVE  RBC,UR 3-5 <6 /HPF  ~ WBC,UR >50 ABN <6 /HPF  URINALYSIS RESULTS MEET CRITERIA TO PERFORM URINE CULTURE. URINE  CULTURE WILL BE ADDED.  ~ EPITHELIAL CELL FEW ABN NEGATIVE  ~ BACTERIA,UR FEW ABN NEGATIVE  ~ MUCOUS PRESENT ABN ABSENT  ~ WBC CLUMPS PRESENT ABN ABSENT  UA TO CX TRIGGER CULTURE INDICATED     1/4/24 - no growth after 48 hours  MEDICATIONS REVIEWED AT FACILITY:    Living will related issues reviewed-Code status: Full code    OBJECTIVE:  /72   Pulse 92   Temp 36.6 °C (97.8 °F)   Resp 18   Ht 1.676 m (5' 6\")   Wt 115 kg (253 lb 3.2 oz)   SpO2 97%   BMI 40.87 kg/m²     Physical Exam  Constitutional:       Appearance: Normal appearance. She is obese.   HENT:      Head: Normocephalic.      Comments: Left crani scar noted and healed TARA     Right Ear: External ear normal.      Left Ear: External ear normal.      Nose: Nose normal.      Mouth/Throat:      Mouth: Mucous membranes are moist.   Eyes:      Extraocular Movements: Extraocular movements intact.      Conjunctiva/sclera: Conjunctivae normal.      Pupils: Pupils are equal, round, and reactive to light.   Cardiovascular:      Rate and Rhythm: Normal rate and regular rhythm.      Pulses: Normal pulses.      Heart sounds: Normal heart sounds.   Pulmonary:      Effort: Pulmonary effort is normal.      Breath sounds: Normal breath sounds.   Abdominal:      " General: Bowel sounds are normal. There is no distension.      Palpations: Abdomen is soft.      Tenderness: There is no abdominal tenderness.   Genitourinary:     Comments: Not examined  Musculoskeletal:         General: Normal range of motion.      Cervical back: Normal range of motion and neck supple.      Right lower leg: Edema present. Non pitting      Left lower leg: Edema present.  Non pitting.     Comments: Generalized weakness, right sided    Skin:     General: Skin is warm and dry.      Capillary Refill: Capillary refill takes less than 2 seconds.   Neurological:      General: No focal deficit present.      Mental Status: She is alert and oriented to person, place, and time. Mental status is at baseline.      Motor: Weakness present.      Comments: Right sided upper power grade 3/5 and lower weakness, power grade 1/5 - wiggles toes  Psychiatric:         Mood and Affect: Mood normal.         Behavior: Behavior normal.         Thought Content: Thought content normal.         Judgment: Judgment normal.      Assessment/Plan  Problem List Items Addressed This Visit       Brain mass    HTN (hypertension)    Obesity    Severe asthma    Status post brain surgery    Flaccid hemiplegia affecting right dominant side (CMS/HCC) - Primary    Physical deconditioning       Skin integrity:  Nursing to monitor skin integrity as patient is at risk for pressure injuries.     PLAN:  Pt has been seen for follow up visit.  The patient is here at facility for PT/OT/ST to maximize strength, function, endurance and safety.  The patient is participating in therapy. Ariane Chavis is making progress to the best of his/her ability.   Please see PT/OT/ST notes in the facility for detailed information regarding progression of patients progress. See HPI.  Recent nursing evaluation and notes were reviewed.   Overall, patient is stable despite his/her chronic conditions.    #Meningioma, s/p craniectomy, post op right  hemiparesis:  LevETIRAcetam Oral Tablet 500 MG - (Levetiracetam) BID (Seizure prophylaxis) - level checked on 12/8, no seizure activity, seen Neuro on 1/3/24 and MRI on 12/26/23 reviewed, CT ordered for 4 wks on 1/31, f/up w/Neuro pending to be scheduled  #HTN: BP readings reviewed. Cont meds Atenolol - Chlorthalidone and Zestril at lower dose.   #Asthma: Resp status is stable  Any decline or change in condition needs to be communicated with the physician or myself.    Discussion with nursing staff regarding ongoing care and management.  If needed, would communicate with family who are not present at this time.   There are no concerns at this time.  We will continue with the medications noted above.    We will continue to follow the patient here at the facility.    Discharge planning: no date at this time  Patient will be discharge home when goals are met.   Patient will need a follow up with PCP in 1-2 weeks after discharge from facility.   If desired and in agreement, [facility] to follow after discharge from the facility for continued PT/OT and Nursing.    *Please note that nursing facility, outside laboratory agency, and  [facility] do not interface.     Completion of the note was done through Dragon voice recognition technology and may include   unintended or grammatical errors which may not have been recognized when finalizing the note.     Time:    LAUREN Garza      Electronically Signed By: LAUREN Garza   1/15/24 12:57 PM

## 2024-01-09 NOTE — PROGRESS NOTES
Name: Ariane Chavis  YOB: 1975    FOLLOW UP VISIT: SNF, post op brain surgery    SUBJECTIVE:  Patient up in her room sitting in WC. She reports to be doing well and gaining strength in right arm and some strength to lower right leg. Seen neuro on 1/3/24 and discussed plan. MRI showed post surgical changes with questions of slight enhancement along the sinus concerning for granulation tissue versus residual tumor. There is also left frontal fluid collection may represent a small hematoma w/out significant brain compression. Per neuro notes the etiology of collection of fluid is unclear. CT of head to be done in 4 weeks.   Patient denies CP or SOB. Patient was on Cipro for a short time for possible UTI, but UA C/S showed no growth and Cipro d'cd on 1/5/24.       REVIEW OF SYSTEMS:   All review of systems are negative unless otherwise stated above under subjective.    LABS REVIEWED AT FACILITY:  1/5/24  WBC 7.2  H&H 10.9/32.3  Platelets 299  CMP-COMPREHENSIVE METABOLIC PNL KATIE  ~ GLUCOSE 101 H mg/dL  GLUCOSE, FASTING 65-99 mg/dL  GLUCOSE, NON-FASTING  mg/dL  SODIUM 139 136-145 mEq/L  POTASSIUM 3.8 3.5-5.3 mEq/L  CHLORIDE 100  mEq/L  CARBON DIOXIDE (CO2) 26 21-33 mEq/L  BUN (UREA NITROGEN) 8 7-25 mg/dL  ~ CREATININE 0.4 L 0.6-1.2 mg/dL  BUN/CREATININE RATIO 20 6-22  GFR-AFRICAN AMERICAN 204 >60 mL/min/1.73 m2  GFR-NON-AFRICAN AMERICAN 169 >60 mL/min/1.73 m2   Stage of CKD eGFR (mL/min/1.73 square meters)   Stage 1 >/= 90 or > 90   Stage 2 60 - 89   Stage 3 30 - 59   Stage 4 15 - 29   Stage 5 </= 14 or < 15  ** GFR is reliable for adults 17 to 69 years with stable kidney   function.  CALCIUM 8.9 8.4-10.2 mg/dL  ~ PROTEIN, TOTAL 5.7 L 6.0-8.3 g/dL  ~ ALBUMIN 3.2 L 3.5-5.5 g/dL  A/G RATIO 1.3 1.0-2.3  ALKALINE PHOS 74  IU/L  AST (SGOT) 8 4-40 IU/L  ALT (SGPT) 11 4-55 IU/L  BILIRUBIN, TOTAL 0.3 0.2-1.2 mg/dL    1/1/24  UA W/CULTURE IF INDICATED KATIE   Grading for Epith Cells,Bact,Cast    "NEGATIVE = 0 - 2   FEW = 3 - 10   MODERATE = 11 - 30   MANY = 31 - 60   TNTC = >61  Criteria for a Urine Culture to be reflexed are:   WBC >5/hpf   RBC >5/hpf   NITRITES =POSITIVE  COLOR LIGHT YELLOW YELLOW  CLARITY CLEAR CLEAR  GLUCOSE,UR NEGATIVE NEGATIVE  BILIRUBIN,UR NEGATIVE NEGATIVE  KETONES,UR NEGATIVE NEGATIVE  SPECIFIC GRAVITY 1.015 1.001-1.030  ~ BLOOD,UR 1+ ABN NEGATIVE  PH, URINE 5.5 5.0-8.5  PROTEIN,UR NEGATIVE NEGATIVE  UROBILINOGEN,UR NEGATIVE NEGATIVE  NITRITE,UR NEGATIVE NEGATIVE  ~ LEUKOCYTES,UR 4+ ABN NEGATIVE  RBC,UR 3-5 <6 /HPF  ~ WBC,UR >50 ABN <6 /HPF  URINALYSIS RESULTS MEET CRITERIA TO PERFORM URINE CULTURE. URINE  CULTURE WILL BE ADDED.  ~ EPITHELIAL CELL FEW ABN NEGATIVE  ~ BACTERIA,UR FEW ABN NEGATIVE  ~ MUCOUS PRESENT ABN ABSENT  ~ WBC CLUMPS PRESENT ABN ABSENT  UA TO CX TRIGGER CULTURE INDICATED     1/4/24 - no growth after 48 hours  MEDICATIONS REVIEWED AT FACILITY:    Living will related issues reviewed-Code status: Full code    OBJECTIVE:  /72   Pulse 92   Temp 36.6 °C (97.8 °F)   Resp 18   Ht 1.676 m (5' 6\")   Wt 115 kg (253 lb 3.2 oz)   SpO2 97%   BMI 40.87 kg/m²     Physical Exam  Constitutional:       Appearance: Normal appearance. She is obese.   HENT:      Head: Normocephalic.      Comments: Left crani scar noted and healed TARA     Right Ear: External ear normal.      Left Ear: External ear normal.      Nose: Nose normal.      Mouth/Throat:      Mouth: Mucous membranes are moist.   Eyes:      Extraocular Movements: Extraocular movements intact.      Conjunctiva/sclera: Conjunctivae normal.      Pupils: Pupils are equal, round, and reactive to light.   Cardiovascular:      Rate and Rhythm: Normal rate and regular rhythm.      Pulses: Normal pulses.      Heart sounds: Normal heart sounds.   Pulmonary:      Effort: Pulmonary effort is normal.      Breath sounds: Normal breath sounds.   Abdominal:      General: Bowel sounds are normal. There is no distension.      " Palpations: Abdomen is soft.      Tenderness: There is no abdominal tenderness.   Genitourinary:     Comments: Not examined  Musculoskeletal:         General: Normal range of motion.      Cervical back: Normal range of motion and neck supple.      Right lower leg: Edema present. Non pitting      Left lower leg: Edema present.  Non pitting.     Comments: Generalized weakness, right sided    Skin:     General: Skin is warm and dry.      Capillary Refill: Capillary refill takes less than 2 seconds.   Neurological:      General: No focal deficit present.      Mental Status: She is alert and oriented to person, place, and time. Mental status is at baseline.      Motor: Weakness present.      Comments: Right sided upper power grade 3/5 and lower weakness, power grade 1/5 - wiggles toes  Psychiatric:         Mood and Affect: Mood normal.         Behavior: Behavior normal.         Thought Content: Thought content normal.         Judgment: Judgment normal.      Assessment/Plan   Problem List Items Addressed This Visit       Brain mass    HTN (hypertension)    Obesity    Severe asthma    Status post brain surgery    Flaccid hemiplegia affecting right dominant side (CMS/HCC) - Primary    Physical deconditioning       Skin integrity:  Nursing to monitor skin integrity as patient is at risk for pressure injuries.     PLAN:  Pt has been seen for follow up visit.  The patient is here at facility for PT/OT/ST to maximize strength, function, endurance and safety.  The patient is participating in therapy. Ariane Chavis is making progress to the best of his/her ability.   Please see PT/OT/ST notes in the facility for detailed information regarding progression of patients progress. See HPI.  Recent nursing evaluation and notes were reviewed.   Overall, patient is stable despite his/her chronic conditions.    #Meningioma, s/p craniectomy, post op right hemiparesis:  LevETIRAcetam Oral Tablet 500 MG - (Levetiracetam) BID (Seizure  prophylaxis) - level checked on 12/8, no seizure activity, seen Neuro on 1/3/24 and MRI on 12/26/23 reviewed, CT ordered for 4 wks on 1/31, f/up w/Neuro pending to be scheduled  #HTN: BP readings reviewed. Cont meds Atenolol - Chlorthalidone and Zestril at lower dose.   #Asthma: Resp status is stable  Any decline or change in condition needs to be communicated with the physician or myself.    Discussion with nursing staff regarding ongoing care and management.  If needed, would communicate with family who are not present at this time.   There are no concerns at this time.  We will continue with the medications noted above.    We will continue to follow the patient here at the facility.    Discharge planning: no date at this time  Patient will be discharge home when goals are met.   Patient will need a follow up with PCP in 1-2 weeks after discharge from facility.   If desired and in agreement, [facility] to follow after discharge from the facility for continued PT/OT and Nursing.    *Please note that nursing facility, outside laboratory agency, and  [facility] do not interface.     Completion of the note was done through Dragon voice recognition technology and may include   unintended or grammatical errors which may not have been recognized when finalizing the note.     Time:    CULLEN Garza-CNP

## 2024-01-19 ENCOUNTER — CONSULT (OUTPATIENT)
Dept: PHYSICAL MEDICINE AND REHAB | Facility: CLINIC | Age: 49
End: 2024-01-19
Payer: COMMERCIAL

## 2024-01-19 DIAGNOSIS — D32.9 MENINGIOMA (MULTI): Primary | ICD-10-CM

## 2024-01-19 DIAGNOSIS — G81.91 RIGHT HEMIPARESIS (MULTI): ICD-10-CM

## 2024-01-19 PROCEDURE — 99204 OFFICE O/P NEW MOD 45 MIN: CPT | Performed by: PHYSICAL MEDICINE & REHABILITATION

## 2024-01-19 NOTE — PROGRESS NOTES
Chief complaint: Post acute rehab follow up for right sided hemiparesis     RHD    TIMELINE OF COMPLAINT(S):  Ariane is a 48-year-old female with past medical history of hypertension and asthma presenting to resident rehab clinic after her acute rehab stay due to right-sided hemiparesis.  A couple months ago she was at home doing laundry when she suddenly had right sided weakness.  She went to the hospital thinking that she had a stroke and a meningioma was found in her parietal lobe on the left side.  They did surgery to remove it and initially right after the surgery she was able to move both sides of her body.  She then suddenly lost all strength in her right arm and leg and was sent to [facility].  This visit is a follow-up from when I saw her as an acute rehab patient.  She is now in [facility] nursing [facility] and continuing to work on her strength.      Last time I saw her she was unable to ambulate due to weakness and now she is using a walker, cane, and hemiwalker at [employer].  She can also sit to stand, toileting, and showering is min assist.  Additionally last time I saw her she was unable to use most of her arm and hand, now she has full range of motion and able to use her hand with her strength back to 75% of her left side.    She still has numbness in right glute, hip, and anterolateral thigh.  Also, she has muscles spasms thigh and lower leg muscles, right sided neck, and right pectoral muscles. Her bowel and her bladder are improving but she still has constipation that she uses miralax and colace. She doesn't always feel like she has to pee, but tries to pee at least 5 times a day and is able to empty her bladder.    Her  is using Kinesiotape to help with the pain in her right shoulder along with checking her skin for pressure sores.    IMAGING:    === 12/26/23 ===    MR BRAIN W AND WO CONTRAST    - Impression -  * Postoperative changes as described  *Interval development  of non mass producing left frontal  proteinaceous or serosanguineous fluid collection *Residual nodular  enhancement in the plane of the superior sagittal sinus consistent  with residual tumor.    MACRO:  none    Signed by: Chance Gore 12/27/2023 7:55 AM  Dictation workstation:   [id number]    === 11/09/23 ===    CT HEAD WO IV CONTRAST    - Impression -  Expected postsurgical changes from left parietal craniectomy/mesh  cranioplasty for left convexity mass debulking/resection as described  above. Residual vasogenic edema versus gliosis within the adjacent  left frontoparietal region.    I personally reviewed the images/study and I agree with the findings  as stated by Watson Alexander MD. This study was interpreted at  [place], [place].    MACRO:  None    Signed by: Matt Jama 11/10/2023 5:02 PM  Dictation workstation:   [id number]    === 11/09/23 ===    US PELVIS TRANSABDOMINAL WITH TRANSVAGINAL    - Impression -  1. Limited exam with nonvisualization of the left ovary. Cystic  adnexal structures seen on 11/09/2023 CT abdomen pelvis are not well  appreciated on this exam.  2. Status post hysterectomy.    I personally reviewed the images/study and I agree with the resident  Adrian Ervin's findings as stated. This study was interpreted  at [place], [place].    MACRO:  None    Signed by: Femi Lyons 11/14/2023 5:36 AM  Dictation workstation:   [id number]    === 11/09/23 ===    XR CHEST 1 VIEW    - Impression -  1. No evidence of acute cardiopulmonary process.        Signed by: Michael Campos 11/9/2023 12:58 PM  Dictation workstation:   [id number]    Lab Results   Component Value Date    HGBA1C 6.3 (H) 09/16/2022       FUNCTIONAL HISTORY: The patient is independent in all ADLs, mobility, and driving. The patient does not use any assistive device.    SH:  Lives in: Single-family home  Lives with:   and kids  Occupation: aCon    ROS: The patient denies any bowel or bladder incontinence/accidents, night sweats, fevers, chills, recent significant weight loss. A 14 point review of systems was reviewed with the patient and is as above and otherwise negative.  ROS questionnaire positive for headache, lightheadedness, limb weakness, poor balance, difficulty walking, numbness, intermittent constipation, anxiety, depression, and muscle spasms.    PHYSICAL EXAM    GEN - Alert, well-developed, well-nourished, no acute distress  PSYCH - Cooperative, appropriate mood and affect  HEENT - NC/AT  RESP - Non-labored respirations, equal expansion  CV - warm and well-perfused, No cyanosis or edema in extremities.   ABD- soft, ND  SKIN - No rash.    NEURO: A&O x 3, gross motor and sensation intact bilaterally, no focal neurologic deficits   RUE strength: 4 -/5 elbow flexors, 4/5 elbow extensors, 3/5 wrist extensors, 3/5 finger flexors, 3/5 finger abductors    LUE strength: 5/5 elbow flexors, 5/5 elbow extensors, 5/5 wrist extensors, 5/5 finger flexors, 5/5 finger abductors    RLE strength: 3/5 hip flexors, 4+/5 knee extensors, 2/5 ankle dorsiflexors, 2/5 EHL, 4/5 ankle plantar flexors   LLE strength: 5/5 hip flexors, 5/5 knee extensors, 5/5 ankle dorsiflexors, 5/5 EHL, 5/5 ankle plantar flexors   Sensation - intact to light touch in bilateral lower extremities.    Reflexes - 3+ biceps, brachioradialis, triceps, patellar and Achilles reflexes on the right   MAS of 1 with elbow flexors, 1+ with knee extensors, 2 with plantarflexors    IMPRESSION:    Ariane is a 48-year-old female with past medical history of hypertension and asthma presenting to resident rehab clinic after her acute rehab stay due to right-sided hemiparesis.  Overall function is improving and her strength continues to come back.  -Increase her baclofen dose by 5 mg 3 times a day at the skilled nursing facility  -Placed order for a custom AFO orthotic, take  to [facility] nursing [facility] to have an orthotist come in to evaluate  -Continue with the exercises and rehab that she has been doing with family and the rehab team at [facility]  -Follow-up in 6 to 8 weeks    -Patient Education: Extensive time was spent educating the patient on relevant anatomy, clinical findings and imaging, as well as discussing the potential diagnoses as discussed above.     The patient expressed understanding and agreement with the assessment and plan. Patient encouraged to contact us should they have any questions, concerns, or any changes in symptoms.     Mirza Caputo, DO        ** Dictated with voice recognition software, please forgive any errors in grammar and/or spelling **

## 2024-01-19 NOTE — PATIENT INSTRUCTIONS
Increase baclofen dose by 5mg TID to help with her increased spasticity.   Placed order for orthotics to get a custom AFO brace  Continue with current rehab plan

## 2024-01-23 ENCOUNTER — NURSING HOME VISIT (OUTPATIENT)
Dept: POST ACUTE CARE | Facility: EXTERNAL LOCATION | Age: 49
End: 2024-01-23
Payer: COMMERCIAL

## 2024-01-23 DIAGNOSIS — G81.01 FLACCID HEMIPLEGIA OF RIGHT DOMINANT SIDE DUE TO NONCEREBROVASCULAR ETIOLOGY (MULTI): ICD-10-CM

## 2024-01-23 DIAGNOSIS — F32.A ANXIETY AND DEPRESSION: ICD-10-CM

## 2024-01-23 DIAGNOSIS — G93.89 BRAIN MASS: ICD-10-CM

## 2024-01-23 DIAGNOSIS — F41.9 ANXIETY AND DEPRESSION: ICD-10-CM

## 2024-01-23 DIAGNOSIS — R53.81 PHYSICAL DECONDITIONING: ICD-10-CM

## 2024-01-23 DIAGNOSIS — I10 PRIMARY HYPERTENSION: Primary | ICD-10-CM

## 2024-01-23 DIAGNOSIS — Z98.890 STATUS POST BRAIN SURGERY: ICD-10-CM

## 2024-01-23 PROCEDURE — 99310 SBSQ NF CARE HIGH MDM 45: CPT | Performed by: NURSE PRACTITIONER

## 2024-01-23 NOTE — LETTER
"Patient: Ariane Chavis  : 1975    Encounter Date: 2024    Name: Ariane Chavis  YOB: 1975    FOLLOW UP VISIT: SNF, Peer to Peer review, meningioma, s/p Left craniectomy w/excision tissue and navigation, mesh cranioplasty, tumor resection 11/10/2023, right sided hemiparesis/hemiopalgia    SUBJECTIVE:  The patient is up in her room with daughter at the bedside. She appears comfortable and in no acute distress. Peer to peer done on 24. She will be receiving more time (til end of ). She is tearful and c/o anxiety/depression. Patient c/o headache that is intermittent to left side of head that feels like pressure. She reports that she cont to have numbness to certain parts of her right side of face, neck, and torso. She reports muscle spasms to right leg at night time. She is moving right arm better and right leg is beginning to have movement in the hip and foot/ankle. Long discussion w/patient regarding anxiety/depression.    REVIEW OF SYSTEMS:   All review of systems are negative unless otherwise stated above under subjective.    LABS REVIEWED AT FACILITY:  No new labs since 24    MEDICATIONS REVIEWED AT FACILITY:  Baclofen 5 mg at bedtime  Effexor 75 mg daily  Xanax PRN  Atenolol - chlorthalidone increased to full tablet 50-25 mg daily    Living will related issues reviewed-Code status: Full code    OBJECTIVE:  /78   Pulse 84   Temp 36.6 °C (97.8 °F)   Resp 18   Ht 1.676 m (5' 6\")   Wt 115 kg (253 lb)   SpO2 97%   BMI 40.84 kg/m²     Physical Exam  Constitutional:       Appearance: Normal appearance. She is obese.   HENT:      Head: Normocephalic.      Comments: Left crani scar noted and healed TARA     Right Ear: External ear normal.      Left Ear: External ear normal.      Nose: Nose normal.      Mouth/Throat:      Mouth: Mucous membranes are moist.   Eyes:      Extraocular Movements: Extraocular movements intact.      Conjunctiva/sclera: Conjunctivae normal.      " Pupils: Pupils are equal, round, and reactive to light.   Cardiovascular:      Rate and Rhythm: Normal rate and regular rhythm.      Pulses: Normal pulses.      Heart sounds: Normal heart sounds.   Pulmonary:      Effort: Pulmonary effort is normal.      Breath sounds: Normal breath sounds.   Abdominal:      General: Bowel sounds are normal. There is no distension.      Palpations: Abdomen is soft.      Tenderness: There is no abdominal tenderness.   Genitourinary:     Comments: Not examined  Musculoskeletal:         General: Normal range of motion.      Cervical back: Normal range of motion and neck supple.      Right lower leg: Edema present. Non pitting      Left lower leg: Edema present.  Non pitting.     Comments: Generalized weakness, right sided    Skin:     General: Skin is warm and dry.      Capillary Refill: Capillary refill takes less than 2 seconds.   Neurological:      General: No focal deficit present.      Mental Status: She is alert and oriented to person, place, and time. Mental status is at baseline.      Motor: Weakness present.      Comments: Right sided upper power grade 4/5 and lower weakness, power grade 1-2/5 - right hip, moves right ankle back and forth and wiggles toes.  Psychiatric:         Mood and Affect: Mood normal.         Behavior: Behavior normal.         Thought Content: Thought content normal.         Judgment: Judgment normal.      Assessment/Plan  Problem List Items Addressed This Visit       Brain mass    HTN (hypertension) - Primary    Status post brain surgery    Flaccid hemiplegia affecting right dominant side (CMS/HCC)    Physical deconditioning    Anxiety and depression       Skin integrity:  Nursing to monitor skin integrity as patient is at risk for pressure injuries.    PLAN:  Pt has been seen for follow up visit.  The patient is here at facility for PT/OT/ST to maximize strength, function, endurance and safety.  The patient is participating in therapy. Ariane Chavis is  making progress to the best of his/her ability.   Please see PT/OT/ST notes in the facility for detailed information regarding progression of patients progress.   Recent nursing evaluation and notes were reviewed.   Overall, patient is stable despite his/her chronic conditions.    #Meningioma, s/p craniectomy, post op right hemiparesis: LevETIRAcetam Oral Tablet 500 MG - (Levetiracetam) BID (Seizure prophylaxis) - level checked on 12/8, no seizure activity, seen Neuro on 1/3/24 and MRI on 12/26/23 reviewed, CT ordered for 4 wks on 1/31, f/up w/Neuro pending to be scheduled, Baclofen at  for muscle spasms.  #HTN: BP readings reviewed. Readings elevated 160/80, Increase Atenolol - Chlorthalidone to full tablet 50-25mg and cont Zestril at same dose.  #Depression/Anxiety: Active listening and reassurance. Effexor started, Xanax PRN  #Asthma: Resp status is stable  Any decline or change in condition needs to be communicated with the physician or myself.    Discussion with nursing staff regarding ongoing care and management.  If needed, would communicate with family who are not present at this time.   We will continue with the medications noted above.    We will continue to follow the patient here at the facility.    Discharge planning: More time approved til end of Jan.  Patient will be discharge home when goals are met.   Patient will need a follow up with PCP in 1-2 weeks after discharge from facility.   If desired and in agreement, [facility] to follow after discharge from the facility for continued PT/OT and Nursing.    *Please note that nursing facility, outside laboratory agency, and  [facility] do not interface.     Completion of the note was done through Dragon voice recognition technology and may include   unintended or grammatical errors which may not have been recognized when finalizing the note.     Time: I spent 45 minutes or greater with the patient. Greater than 50% of this time was spent in counseling and or  coordination of care. The time includes prep time of reviewing vital signs, report from direct nursing staff and or therapists, and or consultations, time directly spent with the patient interviewing, examining, and education regarding diagnosis, treatments, and medications, as well as documentation in the electronic medical record, and reviewing the plan of care and any new orders with the patient, nursing staff and other staff directly related to the patients care.       LAUREN Garza      Electronically Signed By: LAUREN Garza   1/30/24  5:11 PM

## 2024-01-27 VITALS
WEIGHT: 253 LBS | TEMPERATURE: 97.8 F | SYSTOLIC BLOOD PRESSURE: 138 MMHG | OXYGEN SATURATION: 97 % | HEART RATE: 84 BPM | DIASTOLIC BLOOD PRESSURE: 78 MMHG | HEIGHT: 66 IN | BODY MASS INDEX: 40.66 KG/M2 | RESPIRATION RATE: 18 BRPM

## 2024-01-27 NOTE — PROGRESS NOTES
"Name: Ariane Chavis  YOB: 1975    FOLLOW UP VISIT: SNF, Peer to Peer review, meningioma, s/p Left craniectomy w/excision tissue and navigation, mesh cranioplasty, tumor resection 11/10/2023, right sided hemiparesis/hemiopalgia    SUBJECTIVE:  The patient is up in her room with daughter at the bedside. She appears comfortable and in no acute distress. Peer to peer done on 1/17/24. She will be receiving more time (til end of Jan). She is tearful and c/o anxiety/depression. Patient c/o headache that is intermittent to left side of head that feels like pressure. She reports that she cont to have numbness to certain parts of her right side of face, neck, and torso. She reports muscle spasms to right leg at night time. She is moving right arm better and right leg is beginning to have movement in the hip and foot/ankle. Long discussion w/patient regarding anxiety/depression.    REVIEW OF SYSTEMS:   All review of systems are negative unless otherwise stated above under subjective.    LABS REVIEWED AT FACILITY:  No new labs since 1/5/24    MEDICATIONS REVIEWED AT FACILITY:  Baclofen 5 mg at bedtime  Effexor 75 mg daily  Xanax PRN  Atenolol - chlorthalidone increased to full tablet 50-25 mg daily    Living will related issues reviewed-Code status: Full code    OBJECTIVE:  /78   Pulse 84   Temp 36.6 °C (97.8 °F)   Resp 18   Ht 1.676 m (5' 6\")   Wt 115 kg (253 lb)   SpO2 97%   BMI 40.84 kg/m²     Physical Exam  Constitutional:       Appearance: Normal appearance. She is obese.   HENT:      Head: Normocephalic.      Comments: Left crani scar noted and healed TARA     Right Ear: External ear normal.      Left Ear: External ear normal.      Nose: Nose normal.      Mouth/Throat:      Mouth: Mucous membranes are moist.   Eyes:      Extraocular Movements: Extraocular movements intact.      Conjunctiva/sclera: Conjunctivae normal.      Pupils: Pupils are equal, round, and reactive to light.   Cardiovascular: "      Rate and Rhythm: Normal rate and regular rhythm.      Pulses: Normal pulses.      Heart sounds: Normal heart sounds.   Pulmonary:      Effort: Pulmonary effort is normal.      Breath sounds: Normal breath sounds.   Abdominal:      General: Bowel sounds are normal. There is no distension.      Palpations: Abdomen is soft.      Tenderness: There is no abdominal tenderness.   Genitourinary:     Comments: Not examined  Musculoskeletal:         General: Normal range of motion.      Cervical back: Normal range of motion and neck supple.      Right lower leg: Edema present. Non pitting      Left lower leg: Edema present.  Non pitting.     Comments: Generalized weakness, right sided    Skin:     General: Skin is warm and dry.      Capillary Refill: Capillary refill takes less than 2 seconds.   Neurological:      General: No focal deficit present.      Mental Status: She is alert and oriented to person, place, and time. Mental status is at baseline.      Motor: Weakness present.      Comments: Right sided upper power grade 4/5 and lower weakness, power grade 1-2/5 - right hip, moves right ankle back and forth and wiggles toes.  Psychiatric:         Mood and Affect: Mood normal.         Behavior: Behavior normal.         Thought Content: Thought content normal.         Judgment: Judgment normal.      Assessment/Plan   Problem List Items Addressed This Visit       Brain mass    HTN (hypertension) - Primary    Status post brain surgery    Flaccid hemiplegia affecting right dominant side (CMS/HCC)    Physical deconditioning    Anxiety and depression       Skin integrity:  Nursing to monitor skin integrity as patient is at risk for pressure injuries.    PLAN:  Pt has been seen for follow up visit.  The patient is here at facility for PT/OT/ST to maximize strength, function, endurance and safety.  The patient is participating in therapy. Ariane Chavis is making progress to the best of his/her ability.   Please see PT/OT/ST  notes in the facility for detailed information regarding progression of patients progress.   Recent nursing evaluation and notes were reviewed.   Overall, patient is stable despite his/her chronic conditions.    #Meningioma, s/p craniectomy, post op right hemiparesis: LevETIRAcetam Oral Tablet 500 MG - (Levetiracetam) BID (Seizure prophylaxis) - level checked on 12/8, no seizure activity, seen Neuro on 1/3/24 and MRI on 12/26/23 reviewed, CT ordered for 4 wks on 1/31, f/up w/Neuro pending to be scheduled, Baclofen at  for muscle spasms.  #HTN: BP readings reviewed. Readings elevated 160/80, Increase Atenolol - Chlorthalidone to full tablet 50-25mg and cont Zestril at same dose.  #Depression/Anxiety: Active listening and reassurance. Effexor started, Xanax PRN  #Asthma: Resp status is stable  Any decline or change in condition needs to be communicated with the physician or myself.    Discussion with nursing staff regarding ongoing care and management.  If needed, would communicate with family who are not present at this time.   We will continue with the medications noted above.    We will continue to follow the patient here at the facility.    Discharge planning: More time approved til end of Jan.  Patient will be discharge home when goals are met.   Patient will need a follow up with PCP in 1-2 weeks after discharge from facility.   If desired and in agreement, [facility] to follow after discharge from the facility for continued PT/OT and Nursing.    *Please note that nursing facility, outside laboratory agency, and  [facility] do not interface.     Completion of the note was done through Dragon voice recognition technology and may include   unintended or grammatical errors which may not have been recognized when finalizing the note.     Time: I spent 45 minutes or greater with the patient. Greater than 50% of this time was spent in counseling and or coordination of care. The time includes prep time of reviewing vital signs,  report from direct nursing staff and or therapists, and or consultations, time directly spent with the patient interviewing, examining, and education regarding diagnosis, treatments, and medications, as well as documentation in the electronic medical record, and reviewing the plan of care and any new orders with the patient, nursing staff and other staff directly related to the patients care.       Abril Wagner, APRN-CNP

## 2024-01-30 PROBLEM — F41.9 ANXIETY AND DEPRESSION: Status: ACTIVE | Noted: 2024-01-30

## 2024-01-30 PROBLEM — F32.A ANXIETY AND DEPRESSION: Status: ACTIVE | Noted: 2024-01-30

## 2024-01-31 ENCOUNTER — HOSPITAL ENCOUNTER (OUTPATIENT)
Dept: RADIOLOGY | Facility: HOSPITAL | Age: 49
Discharge: HOME | End: 2024-01-31
Payer: COMMERCIAL

## 2024-01-31 DIAGNOSIS — G93.89 BRAIN MASS: ICD-10-CM

## 2024-01-31 PROCEDURE — 70450 CT HEAD/BRAIN W/O DYE: CPT

## 2024-01-31 PROCEDURE — 70450 CT HEAD/BRAIN W/O DYE: CPT | Performed by: RADIOLOGY

## 2024-02-02 ENCOUNTER — NURSING HOME VISIT (OUTPATIENT)
Dept: POST ACUTE CARE | Facility: EXTERNAL LOCATION | Age: 49
End: 2024-02-02
Payer: COMMERCIAL

## 2024-02-02 DIAGNOSIS — G93.89 BRAIN MASS: Primary | ICD-10-CM

## 2024-02-02 DIAGNOSIS — R53.81 PHYSICAL DECONDITIONING: ICD-10-CM

## 2024-02-02 DIAGNOSIS — G81.01 FLACCID HEMIPLEGIA OF RIGHT DOMINANT SIDE DUE TO NONCEREBROVASCULAR ETIOLOGY (MULTI): ICD-10-CM

## 2024-02-02 DIAGNOSIS — E55.9 VITAMIN D DEFICIENCY: ICD-10-CM

## 2024-02-02 DIAGNOSIS — F32.A ANXIETY AND DEPRESSION: ICD-10-CM

## 2024-02-02 DIAGNOSIS — I10 PRIMARY HYPERTENSION: Primary | ICD-10-CM

## 2024-02-02 DIAGNOSIS — Z98.890 STATUS POST BRAIN SURGERY: ICD-10-CM

## 2024-02-02 DIAGNOSIS — F41.9 ANXIETY AND DEPRESSION: ICD-10-CM

## 2024-02-02 PROCEDURE — 99310 SBSQ NF CARE HIGH MDM 45: CPT | Performed by: NURSE PRACTITIONER

## 2024-02-02 NOTE — LETTER
Patient: Ariane Chavis  : 1975    Encounter Date: 2024    Name: Ariane Chavis  YOB: 1975    FOLLOW UP VISIT: SNF, Meningioma, s/p left craniectomy w/excision tissue and navigation, mesh cranioplasty, tumor resection 11/10/23, right sided hemiparesis/hemiopalgia    SUBJECTIVE:  Patient is up in her room sitting in her WC. She is doing well and appears comfortable and in no acute distress. She reports that her anxiety/depression is improving and feels the   Effexor is helping. She is not taking the Xanax PRN that was prescribed. She states that the surgeon was supposed to call her and her  regarding her recent CT of head on 24. Messaged Dr. Mahan regarding the scan and Dr called patient while I was in with patient. Dr. Mahan reviewed results with Ariane. Discussion with Dr. Mahan regarding the patients progress and improvement of right side, arm and leg, intermittent headache to left side frontal region and visual disturbances (described as blurry) with fatigue.   Patient denies CP, SOB, Abd pain, N/V or bowel/bladder issue. Discharge is planned for later this month, however patient is considering possibly the end of next week 24. We shall see.     REVIEW OF SYSTEMS:   All review of systems are negative unless otherwise stated above under subjective.    LABS REVIEWED AT FACILITY:  2024  CMP-COMPREHENSIVE METABOLIC PNL KATIE  ~ GLUCOSE 111 H mg/dL  GLUCOSE, FASTING 65-99 mg/dL  GLUCOSE, NON-FASTING  mg/dL  SODIUM 139 136-145 mEq/L  POTASSIUM 3.7 3.5-5.3 mEq/L  CHLORIDE 98  mEq/L  CARBON DIOXIDE (CO2) 31 21-33 mEq/L  BUN (UREA NITROGEN) 7 7-25 mg/dL  ~ CREATININE 0.4 L 0.6-1.2 mg/dL  BUN/CREATININE RATIO 18 6-22  GFR-AFRICAN AMERICAN 204 >60 mL/min/1.73 m2  GFR-NON-AFRICAN AMERICAN 169 >60 mL/min/1.73 m2   Stage of CKD eGFR (mL/min/1.73 square meters)   Stage 1 >/= 90 or > 90   Stage 2 60 - 89   Stage 3 30 - 59   Stage 4 15 - 29   Stage 5 </= 14 or < 15  ** GFR  "is reliable for adults 17 to 69 years with stable kidney   function.  CALCIUM 9.5 8.4-10.2 mg/dL  PROTEIN, TOTAL 6.9 6.0-8.3 g/dL  Verified  ALBUMIN 4.0 3.5-5.5 g/dL  A/G RATIO 1.4 1.0-2.3  ALKALINE PHOS 86  IU/L  AST (SGOT) 10 4-40 IU/L  ALT (SGPT) 12 4-55 IU/L  BILIRUBIN, TOTAL 0.5 0.2-1.2 mg/dL  LIPEMIA 0  ICTERUS 0  HEMOLYSIS 0  ~ VITAMIN D 25-OH TOTAL 9 L  ng/mL WINSTON  TSH 2.728  WBC 5.9  H/H 12.8/38.1  Plt 339    MEDICATIONS REVIEWED AT FACILITY:  No changes since previous visit    Living will related issues reviewed-Code status: Full code    OBJECTIVE:  /69   Pulse 74   Temp 36.6 °C (97.8 °F)   Resp 18   Ht 1.676 m (5' 6\")   Wt 114 kg (251 lb)   SpO2 96%   BMI 40.51 kg/m²     Physical Exam  Constitutional:       Appearance: Normal appearance. She is obese.   HENT:      Head: Normocephalic.      Comments: Left crani scar noted and healed TARA     Right Ear: External ear normal.      Left Ear: External ear normal.      Nose: Nose normal.      Mouth/Throat:      Mouth: Mucous membranes are moist.   Eyes:      Extraocular Movements: Extraocular movements intact.      Conjunctiva/sclera: Conjunctivae normal.      Pupils: Pupils are equal, round, and reactive to light.   Cardiovascular:      Rate and Rhythm: Normal rate and regular rhythm.      Pulses: Normal pulses.      Heart sounds: Normal heart sounds.   Pulmonary:      Effort: Pulmonary effort is normal.      Breath sounds: Normal breath sounds.   Abdominal:      General: Bowel sounds are normal. There is no distension.      Palpations: Abdomen is soft.      Tenderness: There is no abdominal tenderness.   Genitourinary:     Comments: Not examined  Musculoskeletal:         General: Normal range of motion.      Cervical back: Normal range of motion and neck supple.      Right lower leg: Edema present. Non pitting      Left lower leg: Edema present.  Non pitting.     Comments: Generalized weakness, right sided    Skin:     General: Skin is " warm and dry.      Capillary Refill: Capillary refill takes less than 2 seconds.   Neurological:      General: No focal deficit present.      Mental Status: She is alert and oriented to person, place, and time. Mental status is at baseline.      Motor: Weakness present.      Comments: Right sided upper power grade 4/5 and Right sided lower extremities power grade 3/5  Psychiatric:         Mood and Affect: Mood normal.         Behavior: Behavior normal.         Thought Content: Thought content normal.         Judgment: Judgment normal.         Assessment/Plan  Problem List Items Addressed This Visit       HTN (hypertension) - Primary    Status post brain surgery    Flaccid hemiplegia affecting right dominant side (CMS/HCC)    Physical deconditioning    Anxiety and depression    Vitamin D deficiency       Skin integrity:  Nursing to monitor skin integrity as patient is at risk for pressure injuries.    PLAN:  Pt has been seen for follow up visit.  The patient is here at facility for PT/OT/ST to maximize strength, function, endurance and safety.  The patient is participating in therapy. Ariane Chavis is making progress to the best of his/her ability.   Please see PT/OT/ST notes in the facility for detailed information regarding progression of patients progress.   Recent nursing evaluation and notes were reviewed.   Overall, patient is stable despite his/her chronic conditions.    #Meningioma, s/p craniectomy, post op right hemiparesis: LevETIRAcetam Oral Tablet 500 MG - (Levetiracetam) BID (Seizure prophylaxis) - level checked on 12/8, no seizure activity, Phone call today with Dr. Mahan who reviewed CT done 1/31 with patient. Updated surgeon on patients headache and visual disturbances. Patient will f/up with surgeon in 6 months and sooner if needed. #HTN: BP readings reviewed. Readings reviewed 110/70's with increased Atenolol. Will cont current dose of Atenolol - Chlorthalidone and Zestril. Labs reviewed  today.  #Depression/Anxiety: Active listening and reassurance. Effexor was started 1/23 and patient reporting improvement. Xanax PRN - patient has not used.  #Asthma: Resp status is stable  #Vit D deficiency: Level is at 9. Updated patient by phone call on 2/9 as patient did go home to take OTC cholecalciferol until seen by Dr. Yap - PCP  Any decline or change in condition needs to be communicated with the physician or myself.    Discussion with nursing staff regarding ongoing care and management.  If needed, would communicate with family who are not present at this time.   There are no concerns at this time.  We will continue with the medications noted above.    We will continue to follow the patient here at the facility.    Discharge planning: end of Feb or possibly end of next week.  Patient will be discharge home when goals are met.   Patient will need a follow up with PCP in 1-2 weeks after discharge from facility.   If desired and in agreement, [facility] to follow after discharge from the facility for continued PT/OT and Nursing.    *Please note that nursing facility, outside laboratory agency, and  [facility] do not interface.     Completion of the note was done through Dragon voice recognition technology and may include   unintended or grammatical errors which may not have been recognized when finalizing the note.     Time: I spent 45 minutes or greater with the patient. Greater than 50% of this time was spent in counseling and or coordination of care. The time includes prep time of reviewing vital signs, report from direct nursing staff and or therapists, reviewing labs, radiographs, diagnostic tests and or consultations with Dr. Mahan over message [facility] epic mary and phone call with patient, time directly spent with the patient interviewing, examining, and education regarding diagnosis, treatments, and medications, as well as documentation in the electronic medical record, and reviewing the plan of care and any  new orders with the patient, nursing staff and other staff directly related to the patients care.       LAUREN Garza      Electronically Signed By: LAUREN Garza   2/12/24 11:54 AM

## 2024-02-06 VITALS
SYSTOLIC BLOOD PRESSURE: 108 MMHG | DIASTOLIC BLOOD PRESSURE: 69 MMHG | RESPIRATION RATE: 18 BRPM | HEART RATE: 74 BPM | TEMPERATURE: 97.8 F | OXYGEN SATURATION: 96 % | WEIGHT: 251 LBS | HEIGHT: 66 IN | BODY MASS INDEX: 40.34 KG/M2

## 2024-02-09 ENCOUNTER — TELEPHONE (OUTPATIENT)
Dept: PRIMARY CARE | Facility: CLINIC | Age: 49
End: 2024-02-09
Payer: COMMERCIAL

## 2024-02-09 NOTE — TELEPHONE ENCOUNTER
Caller self pt is scheduled to see physical therapy on Tuesday 02/13 and want a referral order faxed to them at  fax- [phone]   [phone] practice name [facility]

## 2024-02-12 DIAGNOSIS — Z98.890 STATUS POST BRAIN SURGERY: ICD-10-CM

## 2024-02-12 DIAGNOSIS — R53.81 PHYSICAL DECONDITIONING: ICD-10-CM

## 2024-02-12 DIAGNOSIS — G81.01 FLACCID HEMIPLEGIA OF RIGHT DOMINANT SIDE DUE TO NONCEREBROVASCULAR ETIOLOGY (MULTI): ICD-10-CM

## 2024-02-12 DIAGNOSIS — G93.89 BRAIN MASS: Primary | ICD-10-CM

## 2024-02-12 PROBLEM — E55.9 VITAMIN D DEFICIENCY: Status: ACTIVE | Noted: 2024-02-12

## 2024-02-19 ENCOUNTER — DOCUMENTATION (OUTPATIENT)
Dept: CARE COORDINATION | Facility: CLINIC | Age: 49
End: 2024-02-19
Payer: COMMERCIAL

## 2024-02-20 ENCOUNTER — OFFICE VISIT (OUTPATIENT)
Dept: PRIMARY CARE | Facility: CLINIC | Age: 49
End: 2024-02-20
Payer: COMMERCIAL

## 2024-02-20 VITALS
BODY MASS INDEX: 40.98 KG/M2 | HEIGHT: 65 IN | WEIGHT: 246 LBS | SYSTOLIC BLOOD PRESSURE: 126 MMHG | DIASTOLIC BLOOD PRESSURE: 74 MMHG | HEART RATE: 85 BPM | OXYGEN SATURATION: 92 % | TEMPERATURE: 97.1 F

## 2024-02-20 DIAGNOSIS — I10 PRIMARY HYPERTENSION: ICD-10-CM

## 2024-02-20 DIAGNOSIS — G81.01 FLACCID HEMIPLEGIA OF RIGHT DOMINANT SIDE DUE TO NONCEREBROVASCULAR ETIOLOGY (MULTI): ICD-10-CM

## 2024-02-20 DIAGNOSIS — Z86.011: ICD-10-CM

## 2024-02-20 DIAGNOSIS — F32.0 MILD MAJOR DEPRESSION (CMS-HCC): ICD-10-CM

## 2024-02-20 DIAGNOSIS — J45.50 SEVERE PERSISTENT ASTHMA WITHOUT COMPLICATION (MULTI): ICD-10-CM

## 2024-02-20 DIAGNOSIS — Z98.890 STATUS POST BRAIN SURGERY: Primary | ICD-10-CM

## 2024-02-20 DIAGNOSIS — E55.9 VITAMIN D DEFICIENCY: ICD-10-CM

## 2024-02-20 DIAGNOSIS — G40.909 SEIZURE DISORDER (MULTI): ICD-10-CM

## 2024-02-20 DIAGNOSIS — E66.01 CLASS 3 SEVERE OBESITY WITH SERIOUS COMORBIDITY AND BODY MASS INDEX (BMI) OF 40.0 TO 44.9 IN ADULT, UNSPECIFIED OBESITY TYPE (MULTI): ICD-10-CM

## 2024-02-20 PROCEDURE — 3078F DIAST BP <80 MM HG: CPT | Performed by: FAMILY MEDICINE

## 2024-02-20 PROCEDURE — 3074F SYST BP LT 130 MM HG: CPT | Performed by: FAMILY MEDICINE

## 2024-02-20 PROCEDURE — 99214 OFFICE O/P EST MOD 30 MIN: CPT | Performed by: FAMILY MEDICINE

## 2024-02-20 PROCEDURE — 3008F BODY MASS INDEX DOCD: CPT | Performed by: FAMILY MEDICINE

## 2024-02-20 PROCEDURE — 1036F TOBACCO NON-USER: CPT | Performed by: FAMILY MEDICINE

## 2024-02-20 RX ORDER — ERGOCALCIFEROL 1.25 MG/1
50000 CAPSULE ORAL
Qty: 12 CAPSULE | Refills: 0 | Status: SHIPPED | OUTPATIENT
Start: 2024-02-20 | End: 2024-05-14

## 2024-02-20 ASSESSMENT — PATIENT HEALTH QUESTIONNAIRE - PHQ9
1. LITTLE INTEREST OR PLEASURE IN DOING THINGS: SEVERAL DAYS
SUM OF ALL RESPONSES TO PHQ9 QUESTIONS 1 AND 2: 2
2. FEELING DOWN, DEPRESSED OR HOPELESS: SEVERAL DAYS

## 2024-02-20 NOTE — PROGRESS NOTES
Subjective   Patient ID: Ariane Chavis is a 48 y.o. female who presents for Follow-up (Sx FU).  Very pleasant 48-year-old  with history of cervical cancer asthma hypertension and mild depression within her normal state of good health until she developed about 6 months ago headache and seizures she was found to have a large meningioma which required surgery she underwent excision had some complications postoperatively including seizures and significant hemiplegia she was in rehab for a very long time has finally returned home is still working on physical therapy for strengthening of her right side seizures are under control on Keppra with no side effects she has not had any difficulty breathing or any asthma problems her vision is good her speech is good she is able to communicate well she feels well her energy level is good her appetite has been good she has not fallen and she is still seeing therapy as well and her neurologist her blood pressure has been stable no blurry or double vision and no recent seizures        Review of Systems  Constitutional: no chills, no fever and no night sweats.   Eyes: no blurred vision and no eyesight problems.   ENT: no hearing loss, no nasal congestion, no nasal discharge, no hoarseness and no sore throat.   Cardiovascular: no chest pain, no intermittent leg claudication, no lower extremity edema, no palpitations and no syncope.   Respiratory: no cough, no shortness of breath during exertion, no shortness of breath at rest and no wheezing.   Gastrointestinal: no abdominal pain, no blood in stools, no constipation, no diarrhea, no melena, no nausea, no rectal pain and no vomiting.   Genitourinary: no dysuria, no change in urinary frequency, no urinary hesitancy, no feelings of urinary urgency and no vaginal discharge.   Musculoskeletal: no arthralgias,  no back pain and no myalgias.   Integumentary: no new skin lesions and no rashes.   Neurological: no difficulty  "walking, no headache, no limb weakness, no numbness and no tingling.   Psychiatric: no anxiety, no depression, no anhedonia and no substance use disorders.   Endocrine: no recent weight gain and no recent weight loss.   Hematologic/Lymphatic: no tendency for easy bruising and no swollen glands .    Objective    /74   Pulse 85   Temp 36.2 °C (97.1 °F)   Ht 1.651 m (5' 5\")   Wt 112 kg (246 lb)   SpO2 92%   BMI 40.94 kg/m²    Physical Exam  The patient appeared well nourished and normally developed. Vital signs as documented. Head exam is unremarkable. No scleral icterus or corneal arcus noted.  Pupils are equal round reactive to light extraocular movements are intact no hemorrhages noted on funduscopic exam mouth mucous membranes are moist no exudates ears canals clear TMs are gray pearly not injected nose no rhinorrhea or epistaxis Neck is without jugular venous distension, thyromegaly, or carotid bruits. Carotid upstrokes are brisk bilaterally. Lungs are clear to auscultation and percussion. Cardiac exam reveals the PMI to be normally sized and situated. Rhythm is regular. First and second heart sounds normal. No murmurs, rubs or gallops. Abdominal exam reveals normal bowel sounds, no masses, no organomegaly and no aortic enlargement. Extremities are nonedematous and both femoral and pedal pulses are normal.  Neurologic exam DTRs are equal bilaterally no focal deficits strength is symmetrical heme lymph no palpable lymph nodes in the neck axilla or groin very minimally    Assessment/Plan   Problem List Items Addressed This Visit       Primary hypertension     Stable and controlled on atenolol and lisinopril  Continue current medication and managmement            Class 3 severe obesity with serious comorbidity and body mass index (BMI) of 40.0 to 44.9 in adult (CMS/[facility])     Above normal BMI nutrition and physical activity reviewed         Status post brain surgery - Primary     Currently stable " contintinue physical therapy  No sign of infeciton  Followup in 3 months          Flaccid hemiplegia affecting right dominant side (CMS/HCC)     Gradually improving  Continue PT   Followup in 3 months          Vitamin D deficiency    Relevant Medications    ergocalciferol (Vitamin D-2) 1.25 MG (70007 UT) capsule    Severe persistent asthma without complication     Stable continue current medication and management          Mild major depression (CMS/HCC)     Stable and controlled   Continue current medication and management          Seizure disorder (CMS/HCC)     Stable   Continue keppra         History of benign tumor of cerebral meninges     S/p excision  Right side weakness  Continue therapy                 Macy Yap MD

## 2024-03-14 PROBLEM — Z77.21: Status: RESOLVED | Noted: 2024-03-14 | Resolved: 2024-03-14

## 2024-03-14 PROBLEM — F32.0 MILD MAJOR DEPRESSION (CMS-HCC): Status: ACTIVE | Noted: 2024-03-14

## 2024-03-14 PROBLEM — L50.9 URTICARIA: Status: RESOLVED | Noted: 2023-11-09 | Resolved: 2024-03-14

## 2024-03-14 PROBLEM — E66.813 CLASS 3 SEVERE OBESITY WITH SERIOUS COMORBIDITY AND BODY MASS INDEX (BMI) OF 40.0 TO 44.9 IN ADULT: Status: ACTIVE | Noted: 2023-11-09

## 2024-03-14 PROBLEM — F41.9 ANXIETY AND DEPRESSION: Status: RESOLVED | Noted: 2024-01-30 | Resolved: 2024-03-14

## 2024-03-14 PROBLEM — E66.01 CLASS 3 SEVERE OBESITY WITH SERIOUS COMORBIDITY AND BODY MASS INDEX (BMI) OF 40.0 TO 44.9 IN ADULT (MULTI): Status: ACTIVE | Noted: 2023-11-09

## 2024-03-14 PROBLEM — R56.9 SEIZURE (MULTI): Status: ACTIVE | Noted: 2024-03-14

## 2024-03-14 PROBLEM — Z86.79 HISTORY OF CARDIAC ARRHYTHMIA: Status: RESOLVED | Noted: 2024-03-14 | Resolved: 2024-03-14

## 2024-03-14 PROBLEM — R60.0 LOCALIZED EDEMA: Status: ACTIVE | Noted: 2023-11-09

## 2024-03-14 PROBLEM — L20.9 ATOPIC ECZEMA: Status: ACTIVE | Noted: 2022-03-02

## 2024-03-14 PROBLEM — R56.9 SEIZURE (MULTI): Status: RESOLVED | Noted: 2024-03-14 | Resolved: 2024-03-14
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
## 2024-03-14 PROBLEM — G93.89 BRAIN MASS: Status: RESOLVED | Noted: 2023-11-07 | Resolved: 2024-03-14

## 2024-03-14 PROBLEM — F32.A ANXIETY AND DEPRESSION: Status: RESOLVED | Noted: 2024-01-30 | Resolved: 2024-03-14

## 2024-03-14 PROBLEM — R60.0 LOCALIZED EDEMA: Status: RESOLVED | Noted: 2023-11-09 | Resolved: 2024-03-14

## 2024-03-14 PROBLEM — Z86.011: Status: ACTIVE | Noted: 2024-03-14

## 2024-03-14 PROBLEM — G40.909 SEIZURE DISORDER (MULTI): Status: ACTIVE | Noted: 2024-03-14

## 2024-03-14 PROBLEM — J40 BRONCHITIS: Status: RESOLVED | Noted: 2024-03-14 | Resolved: 2024-03-14

## 2024-03-14 PROBLEM — R39.9 SYMPTOMS INVOLVING URINARY SYSTEM: Status: RESOLVED | Noted: 2024-03-14 | Resolved: 2024-03-14

## 2024-03-14 PROBLEM — G47.00 INSOMNIA: Status: ACTIVE | Noted: 2024-03-14

## 2024-03-14 PROBLEM — R05.9 COUGH: Status: RESOLVED | Noted: 2024-03-14 | Resolved: 2024-03-14

## 2024-03-14 PROBLEM — J31.0 CHRONIC RHINITIS: Status: ACTIVE | Noted: 2024-03-14

## 2024-03-14 PROBLEM — J45.50 SEVERE PERSISTENT ASTHMA WITHOUT COMPLICATION (MULTI): Status: ACTIVE | Noted: 2022-03-02

## 2024-03-14 PROBLEM — Z85.41 HISTORY OF CERVICAL CANCER: Status: ACTIVE | Noted: 2024-03-14

## 2024-03-14 PROBLEM — J45.909 SEVERE ASTHMA (HHS-HCC): Status: RESOLVED | Noted: 2023-11-09 | Resolved: 2024-03-14

## 2024-03-14 PROBLEM — J30.89 CHRONIC NONSEASONAL ALLERGIC RHINITIS DUE TO POLLEN: Status: RESOLVED | Noted: 2022-03-02 | Resolved: 2024-03-14

## 2024-03-14 PROBLEM — Z85.41 HISTORY OF MALIGNANT NEOPLASM OF CERVIX: Status: RESOLVED | Noted: 2024-03-14 | Resolved: 2024-03-14

## 2024-03-14 PROBLEM — Z91.010 HISTORY OF PEANUT ALLERGY: Status: ACTIVE | Noted: 2022-02-17

## 2024-03-14 PROBLEM — Z85.820 HISTORY OF MELANOMA: Status: ACTIVE | Noted: 2024-03-14

## 2024-03-14 RX ORDER — ATENOLOL 50 MG/1
50 TABLET ORAL
COMMUNITY
Start: 2023-11-08 | End: 2024-03-14 | Stop reason: WASHOUT

## 2024-03-14 RX ORDER — METRONIDAZOLE 10 MG/G
1 GEL TOPICAL
COMMUNITY
Start: 2023-01-03

## 2024-03-14 NOTE — PATIENT INSTRUCTIONS
Today you were seen for follow-up from your recent surgery and hospitalization  Continue current treatment and please schedule annual wellness exam

## 2024-03-15 ENCOUNTER — APPOINTMENT (OUTPATIENT)
Dept: PHYSICAL MEDICINE AND REHAB | Facility: CLINIC | Age: 49
End: 2024-03-15
Payer: COMMERCIAL

## 2024-04-08 ENCOUNTER — OFFICE VISIT (OUTPATIENT)
Dept: PRIMARY CARE | Facility: CLINIC | Age: 49
End: 2024-04-08
Payer: COMMERCIAL

## 2024-04-08 VITALS
WEIGHT: 257 LBS | DIASTOLIC BLOOD PRESSURE: 95 MMHG | HEART RATE: 87 BPM | BODY MASS INDEX: 42.82 KG/M2 | OXYGEN SATURATION: 91 % | SYSTOLIC BLOOD PRESSURE: 168 MMHG | HEIGHT: 65 IN | TEMPERATURE: 96.9 F

## 2024-04-08 DIAGNOSIS — G81.01 FLACCID HEMIPLEGIA OF RIGHT DOMINANT SIDE DUE TO NONCEREBROVASCULAR ETIOLOGY (MULTI): ICD-10-CM

## 2024-04-08 DIAGNOSIS — D32.9 MENINGIOMA (MULTI): ICD-10-CM

## 2024-04-08 DIAGNOSIS — J45.50 SEVERE PERSISTENT ASTHMA WITHOUT COMPLICATION (MULTI): ICD-10-CM

## 2024-04-08 DIAGNOSIS — I10 PRIMARY HYPERTENSION: Primary | ICD-10-CM

## 2024-04-08 DIAGNOSIS — L08.9 SUPERFICIAL SKIN INFECTION: ICD-10-CM

## 2024-04-08 DIAGNOSIS — G40.909 SEIZURE DISORDER (MULTI): ICD-10-CM

## 2024-04-08 PROCEDURE — 3077F SYST BP >= 140 MM HG: CPT | Performed by: FAMILY MEDICINE

## 2024-04-08 PROCEDURE — 99214 OFFICE O/P EST MOD 30 MIN: CPT | Performed by: FAMILY MEDICINE

## 2024-04-08 PROCEDURE — 3008F BODY MASS INDEX DOCD: CPT | Performed by: FAMILY MEDICINE

## 2024-04-08 PROCEDURE — 1036F TOBACCO NON-USER: CPT | Performed by: FAMILY MEDICINE

## 2024-04-08 PROCEDURE — 3080F DIAST BP >= 90 MM HG: CPT | Performed by: FAMILY MEDICINE

## 2024-04-08 RX ORDER — ALBUTEROL SULFATE 90 UG/1
2 AEROSOL, METERED RESPIRATORY (INHALATION) EVERY 6 HOURS PRN
Qty: 18 G | Status: CANCELLED | OUTPATIENT
Start: 2024-04-08

## 2024-04-08 RX ORDER — BACLOFEN 10 MG/1
5 TABLET ORAL 3 TIMES DAILY
Status: CANCELLED | OUTPATIENT
Start: 2024-04-08

## 2024-04-08 RX ORDER — BACLOFEN 10 MG/1
5 TABLET ORAL 3 TIMES DAILY
COMMUNITY

## 2024-04-08 RX ORDER — LEVALBUTEROL INHALATION SOLUTION 1.25 MG/3ML
1 SOLUTION RESPIRATORY (INHALATION) EVERY 6 HOURS PRN
Qty: 72 ML | Refills: 11 | Status: SHIPPED | OUTPATIENT
Start: 2024-04-08 | End: 2024-07-07

## 2024-04-08 RX ORDER — LISINOPRIL 20 MG/1
20 TABLET ORAL DAILY
Qty: 90 TABLET | Refills: 3 | Status: SHIPPED | OUTPATIENT
Start: 2024-04-08 | End: 2025-04-08

## 2024-04-08 RX ORDER — BACLOFEN 10 MG/1
10 TABLET ORAL NIGHTLY
Qty: 90 TABLET | Refills: 3 | Status: SHIPPED | OUTPATIENT
Start: 2024-04-08 | End: 2025-04-08

## 2024-04-08 RX ORDER — BUDESONIDE 1 MG/2ML
1 INHALANT ORAL
Qty: 60 ML | Refills: 3 | Status: SHIPPED | OUTPATIENT
Start: 2024-04-08 | End: 2025-04-08

## 2024-04-08 RX ORDER — LEVETIRACETAM 500 MG/1
500 TABLET ORAL 2 TIMES DAILY
Status: CANCELLED | OUTPATIENT
Start: 2024-04-08

## 2024-04-08 RX ORDER — ONDANSETRON 4 MG/1
4 TABLET, FILM COATED ORAL EVERY 8 HOURS PRN
Qty: 20 TABLET | Status: CANCELLED | OUTPATIENT
Start: 2024-04-08

## 2024-04-08 RX ORDER — MUPIROCIN 20 MG/G
OINTMENT TOPICAL 3 TIMES DAILY
Qty: 22 G | Refills: 0 | Status: SHIPPED | OUTPATIENT
Start: 2024-04-08 | End: 2024-04-18

## 2024-04-08 RX ORDER — LEVETIRACETAM 500 MG/1
500 TABLET ORAL 2 TIMES DAILY
Qty: 180 TABLET | Refills: 3 | Status: SHIPPED | OUTPATIENT
Start: 2024-04-08

## 2024-04-08 RX ORDER — ALBUTEROL SULFATE 90 UG/1
2 AEROSOL, METERED RESPIRATORY (INHALATION) EVERY 6 HOURS PRN
Qty: 18 G | Refills: 11 | Status: SHIPPED | OUTPATIENT
Start: 2024-04-08

## 2024-04-08 RX ORDER — BUDESONIDE 1 MG/2ML
1 INHALANT ORAL
Qty: 60 ML | Status: CANCELLED | OUTPATIENT
Start: 2024-04-08 | End: 2025-08-17

## 2024-04-08 NOTE — PROGRESS NOTES
Subjective   Patient ID: Ariane Chavis is a 49 y.o. female who presents for Follow-up (Pt states her HR is High/low, needs FU mammogram order for mass last year. And cologuard).  Very pleasant 49-year-old female high blood pressure readings at home and became concerned  She recently had resection of a meningioma with resulting hemiplegia and seizures she is seizure-free on her current meds she needs refill of her medicines for the seizures as well as a muscle relaxer for the leg spasms that she has been having and the muscle aches especially at night  This is been under control working well for her she has not been having any seizures since she is getting her strength back and she is doing much better but her blood pressure has been elevated has not had any chest pain or shortness of breath or exertional symptoms no headache no blurry or double vision with use.  Blood pressure has always been controlled previously  No headache or blurry or double vision  Also has superficial skin infection needs topical cream  Also here for follow-up asthma has had it for many years usually allergies or viruses cause it to react she was having tachycardia on albuterol she previously has used Xopenex like a refill on that and also needs her albuterol as well and Pulmicort  No problems or side effects with these issues needs refills no exacerbations or hospitalizations        Review of Systems  Constitutional: no chills, no fever and no night sweats.   Eyes: no blurred vision and no eyesight problems.   ENT: no hearing loss, no nasal congestion, no nasal discharge, no hoarseness and no sore throat.   Cardiovascular: no chest pain, no intermittent leg claudication, no lower extremity edema, no palpitations and no syncope.   Respiratory: no cough, no shortness of breath during exertion, no shortness of breath at rest and no wheezing.   Gastrointestinal: no abdominal pain, no blood in stools, no constipation, no diarrhea, no melena, no  "nausea, no rectal pain and no vomiting.   Genitourinary: no dysuria, no change in urinary frequency, no urinary hesitancy, no feelings of urinary urgency and no vaginal discharge.   Musculoskeletal: no arthralgias,  no back pain and no myalgias.   Integumentary: no new skin lesions and no rashes.   Neurological: no difficulty walking, no headache, no limb weakness, no numbness and no tingling.   Psychiatric: no anxiety, no depression, no anhedonia and no substance use disorders.   Endocrine: no recent weight gain and no recent weight loss.   Hematologic/Lymphatic: no tendency for easy bruising and no swollen glands .    Objective    BP (!) 168/95   Pulse 87   Temp 36.1 °C (96.9 °F)   Ht 1.651 m (5' 5\")   Wt 117 kg (257 lb)   SpO2 91%   BMI 42.77 kg/m²    Physical Exam  The patient appeared well nourished and normally developed. Vital signs as documented. Head exam is unremarkable. No scleral icterus or corneal arcus noted.  Pupils are equal round reactive to light extraocular movements are intact no hemorrhages noted on funduscopic exam mouth mucous membranes are moist no exudates ears canals clear TMs are gray pearly not injected nose no rhinorrhea or epistaxis Neck is without jugular venous distension, thyromegaly, or carotid bruits. Carotid upstrokes are brisk bilaterally. Lungs are clear to auscultation and percussion. Cardiac exam reveals the PMI to be normally sized and situated. Rhythm is regular. First and second heart sounds normal. No murmurs, rubs or gallops. Abdominal exam reveals normal bowel sounds, no masses, no organomegaly and no aortic enlargement. Extremities are nonedematous and both femoral and pedal pulses are normal.  Neurologic exam DTRs are equal bilaterally no focal deficits strength is symmetrical heme lymph no palpable lymph nodes in the neck axilla or groin  Mild hemiaplesia no seizures       Assessment/Plan   Problem List Items Addressed This Visit       Primary hypertension - " Primary    Relevant Medications    lisinopril (PriniviL) 20 mg tablet    Flaccid hemiplegia affecting right dominant side (Multi)    Relevant Medications    baclofen (Lioresal) 10 mg tablet    Severe persistent asthma without complication (Multi)    Relevant Medications    budesonide (Pulmicort) 1 mg/2 mL nebulizer solution    albuterol 90 mcg/actuation inhaler    levalbuterol (Xopenex) 1.25 mg/3 mL nebulizer solution    Seizure disorder (Multi)    Relevant Medications    levETIRAcetam (Keppra) 500 mg tablet    Meningioma (Multi)    Superficial skin infection        Meningioma is stable continue physical therapy  Seizures stable on Keppra continue medication  Follow-up asthma continue Xopenex albuterol at home  Largely allergy days  Muscle relaxer for the hemiaplasia  Hypertension poorly controlled increase lisinopril to 20 mg recheck blood pressure in 2 weeks          Macy Yap MD

## 2024-04-29 PROBLEM — L08.9 SUPERFICIAL SKIN INFECTION: Status: ACTIVE | Noted: 2024-04-29

## 2024-05-09 ENCOUNTER — APPOINTMENT (OUTPATIENT)
Dept: PRIMARY CARE | Facility: CLINIC | Age: 49
End: 2024-05-09
Payer: COMMERCIAL

## 2024-05-09 RX ORDER — DOXYCYCLINE 100 MG/1
100 CAPSULE ORAL 2 TIMES DAILY
Qty: 20 CAPSULE | Refills: 0 | Status: CANCELLED | OUTPATIENT
Start: 2024-05-09 | End: 2024-05-19

## 2024-05-09 ASSESSMENT — ENCOUNTER SYMPTOMS
CHILLS: 0
SINUS PRESSURE: 0
ABDOMINAL PAIN: 0
WHEEZING: 0
SORE THROAT: 0
PALPITATIONS: 0
VOMITING: 0
FACIAL SWELLING: 0
CHEST TIGHTNESS: 0
MYALGIAS: 0
RHINORRHEA: 0
FEVER: 0
EYE DISCHARGE: 0
COUGH: 0
EYE REDNESS: 0
SHORTNESS OF BREATH: 0
FATIGUE: 0
DIARRHEA: 0
ARTHRALGIAS: 0
HEADACHES: 0

## 2024-05-09 NOTE — PROGRESS NOTES
Subjective   Patient ID: Ariane Chavis is a 49 y.o. female who presents for No chief complaint on file..  Last physical: due    current sx-  when did sx start-  did pt take a covid-19 test?  if yes when?  Last albuterol inh use-  Last neb use-  Using trelegy daily consistently?      HPI  Cough x 2d    no sob, wheezing, tight upper chest, fever, chills, muscle/jt pain, HA, ST, new loss of taste or smell, diarrhea, vomiting, nausea, abdominal pain, fatigue, weakness, red eye, rash, bruising, cyanosis, ear pain, ear pressure, runny nose, stuffy nose, post nasal drip, pain with deep breath, leg or foot swelling, calf pain  loss of appetite-  fluids-  has urinated at least 3 times in 24hrs  Seasonal allergies-  Selftxt-    No known exposure to COVID-19  No known exposure to strep  No known exposure to influenza  No one sick around the pt      Risk factors:  Chronic disease/comorbidities: htn, h/o brain surgery for brain ca; asthma  not a healthcare worker  Age: not 65yrs of age and older      No care team member to display     Review of Systems   Constitutional:  Negative for chills, fatigue and fever.   HENT:  Negative for congestion, ear discharge, ear pain, facial swelling, postnasal drip, rhinorrhea, sinus pressure and sore throat.    Eyes:  Negative for discharge and redness.   Respiratory:  Negative for cough, chest tightness, shortness of breath and wheezing.    Cardiovascular:  Negative for chest pain, palpitations and leg swelling.   Gastrointestinal:  Negative for abdominal pain, diarrhea and vomiting.   Musculoskeletal:  Negative for arthralgias and myalgias.   Skin:  Negative for rash.   Neurological:  Negative for headaches.       Objective   There were no vitals taken for this visit.   BP Readings from Last 3 Encounters:   04/08/24 (!) 168/95   02/20/24 126/74   02/02/24 108/69     Wt Readings from Last 3 Encounters:   04/08/24 117 kg (257 lb)   02/20/24 112 kg (246 lb)   02/02/24 114 kg (251 lb)        Physical Exam  Constitutional:       Appearance: Normal appearance.   HENT:      Head: Normocephalic.      Right Ear: Tympanic membrane, ear canal and external ear normal.      Left Ear: Tympanic membrane, ear canal and external ear normal.      Nose: Nose normal.      Mouth/Throat:      Mouth: Mucous membranes are moist.      Pharynx: No oropharyngeal exudate or posterior oropharyngeal erythema.   Cardiovascular:      Rate and Rhythm: Normal rate and regular rhythm.      Heart sounds: Normal heart sounds.   Pulmonary:      Effort: Pulmonary effort is normal.      Breath sounds: Normal breath sounds. No wheezing, rhonchi or rales.   Lymphadenopathy:      Cervical: No cervical adenopathy.   Neurological:      Mental Status: She is alert.         Assessment/Plan   {Assess/PlanSmartLinks:10384}

## 2024-05-20 ENCOUNTER — OFFICE VISIT (OUTPATIENT)
Dept: PRIMARY CARE | Facility: CLINIC | Age: 49
End: 2024-05-20
Payer: COMMERCIAL

## 2024-05-20 VITALS
WEIGHT: 251.4 LBS | BODY MASS INDEX: 41.88 KG/M2 | TEMPERATURE: 96.4 F | HEIGHT: 65 IN | OXYGEN SATURATION: 93 % | HEART RATE: 92 BPM | SYSTOLIC BLOOD PRESSURE: 134 MMHG | DIASTOLIC BLOOD PRESSURE: 86 MMHG

## 2024-05-20 DIAGNOSIS — B37.31 YEAST VAGINITIS: ICD-10-CM

## 2024-05-20 DIAGNOSIS — E66.01 CLASS 3 SEVERE OBESITY WITH SERIOUS COMORBIDITY AND BODY MASS INDEX (BMI) OF 40.0 TO 44.9 IN ADULT, UNSPECIFIED OBESITY TYPE (MULTI): ICD-10-CM

## 2024-05-20 DIAGNOSIS — Z12.11 COLON CANCER SCREENING: ICD-10-CM

## 2024-05-20 DIAGNOSIS — Z12.31 ENCOUNTER FOR SCREENING MAMMOGRAM FOR BREAST CANCER: ICD-10-CM

## 2024-05-20 DIAGNOSIS — Z00.00 ANNUAL PHYSICAL EXAM: Primary | ICD-10-CM

## 2024-05-20 PROBLEM — E66.813 OBESITY, CLASS III, BMI 40-49.9 (MORBID OBESITY): Status: RESOLVED | Noted: 2024-05-12 | Resolved: 2024-05-20

## 2024-05-20 PROBLEM — E87.6 HYPOKALEMIA: Status: RESOLVED | Noted: 2024-05-12 | Resolved: 2024-05-20

## 2024-05-20 PROCEDURE — 3008F BODY MASS INDEX DOCD: CPT | Performed by: FAMILY MEDICINE

## 2024-05-20 PROCEDURE — 3075F SYST BP GE 130 - 139MM HG: CPT | Performed by: FAMILY MEDICINE

## 2024-05-20 PROCEDURE — 3079F DIAST BP 80-89 MM HG: CPT | Performed by: FAMILY MEDICINE

## 2024-05-20 PROCEDURE — 99396 PREV VISIT EST AGE 40-64: CPT | Performed by: FAMILY MEDICINE

## 2024-05-20 PROCEDURE — 1036F TOBACCO NON-USER: CPT | Performed by: FAMILY MEDICINE

## 2024-05-20 RX ORDER — FLUCONAZOLE 150 MG/1
150 TABLET ORAL ONCE
Qty: 1 TABLET | Refills: 1 | Status: SHIPPED | OUTPATIENT
Start: 2024-05-20 | End: 2024-05-20

## 2024-05-20 RX ORDER — BENRALIZUMAB 30 MG/ML
30 INJECTION, SOLUTION SUBCUTANEOUS
COMMUNITY
Start: 2024-05-14 | End: 2024-08-12

## 2024-05-20 RX ORDER — PREDNISONE 20 MG/1
40 TABLET ORAL DAILY
COMMUNITY
Start: 2024-05-09 | End: 2024-05-14

## 2024-05-20 RX ORDER — PREDNISONE 10 MG/1
TABLET ORAL
COMMUNITY
Start: 2024-05-13

## 2024-05-20 RX ORDER — CROMOLYN SODIUM 40 MG/ML
SOLUTION/ DROPS OPHTHALMIC
COMMUNITY

## 2024-05-20 ASSESSMENT — PATIENT HEALTH QUESTIONNAIRE - PHQ9
2. FEELING DOWN, DEPRESSED OR HOPELESS: NOT AT ALL
SUM OF ALL RESPONSES TO PHQ9 QUESTIONS 1 AND 2: 0
1. LITTLE INTEREST OR PLEASURE IN DOING THINGS: NOT AT ALL

## 2024-05-20 NOTE — PROGRESS NOTES
Subjective   Patient ID: Ariane Chavis is a 49 y.o. female who presents for Annual Exam and Annual exam follow-up recent hospitalization for brain tumo.  Very pleasant 49-year-old follow-up for recent resection of meningioma she has been having significant deconditioning is now doing better is now walking with a walker her vision is better she still feels somewhat tired continues to have breathing issues has not had any seizures she is also here because she has a yeast infection she has some vaginal itching and discharge no abdominal pain or fever  Annual wellness exam she is behind on some preventative parameters she is not a smoker she does have a history of hysterectomy due to uterine cancer and has also had a melanoma in the past she also recently underwent the extensive resection of the meningioma with significant post surgical complications she is feeling better starting to get stronger still has significant weakness but is improving is now walking much better with a cane and is transferring better she is not taking any supplements her immunizations are up-to-date she is not a smoker she does have an increased BMI and has not really been at the level where she could exercise yet but is trying to watch her diet and she is otherwise doing well  No vaginal bleeding no breast pain or lumps no new family history        Review of Systems  Constitutional: no chills, no fever and no night sweats.   Eyes: no blurred vision and no eyesight problems.   ENT: no hearing loss, no nasal congestion, no nasal discharge, no hoarseness and no sore throat.   Cardiovascular: no chest pain, no intermittent leg claudication, no lower extremity edema, no palpitations and no syncope.   Respiratory: no cough, no shortness of breath during exertion, no shortness of breath at rest and no wheezing.   Gastrointestinal: no abdominal pain, no blood in stools, no constipation, no diarrhea, no melena, no nausea, no rectal pain and no vomiting.  "  Genitourinary: no dysuria, no change in urinary frequency, no urinary hesitancy, no feelings of urinary urgency and no vaginal discharge.   Musculoskeletal: no arthralgias,  no back pain and no myalgias.   Integumentary: no new skin lesions and no rashes.   Neurological: no difficulty walking, no headache, no limb weakness, no numbness and no tingling.   Psychiatric: no anxiety, no depression, no anhedonia and no substance use disorders.   Endocrine: no recent weight gain and no recent weight loss.   Hematologic/Lymphatic: no tendency for easy bruising and no swollen glands .    Objective    /86   Pulse 92   Temp 35.8 °C (96.4 °F)   Ht 1.651 m (5' 5\")   Wt 114 kg (251 lb 6.4 oz)   SpO2 93%   BMI 41.84 kg/m²    Physical Exam  The patient appeared well nourished and normally developed. Vital signs as documented. Head exam is unremarkable. No scleral icterus or corneal arcus noted.  Pupils are equal round reactive to light extraocular movements are intact no hemorrhages noted on funduscopic exam mouth mucous membranes are moist no exudates ears canals clear TMs are gray pearly not injected nose no rhinorrhea or epistaxis Neck is without jugular venous distension, thyromegaly, or carotid bruits. Carotid upstrokes are brisk bilaterally. Lungs are clear to auscultation and percussion. Cardiac exam reveals the PMI to be normally sized and situated. Rhythm is regular. First and second heart sounds normal. No murmurs, rubs or gallops. Abdominal exam reveals normal bowel sounds, no masses, no organomegaly and no aortic enlargement. Extremities are nonedematous and both femoral and pedal pulses are normal.  Neurologic exam DTRs are equal bilaterally no focal deficits strength is symmetrical heme lymph no palpable lymph nodes in the neck axilla or groin    Assessment/Plan   Problem List Items Addressed This Visit       Class 3 severe obesity with serious comorbidity and body mass index (BMI) of 40.0 to 44.9 in " adult (Multi)     Above normal BMI nutrition and physical activity reviewed  Work on healthy diet at this time and will worry about physical activity later just working on muscle conditioning at this time         Annual physical exam - Primary     Stable physical exam  Deconditioning improving   Continue PT  Continue annual exam           Relevant Orders    Hepatitis C antibody     Other Visit Diagnoses       Encounter for screening mammogram for breast cancer        Relevant Orders    BI mammo bilateral screening tomosynthesis    Colon cancer screening        Relevant Orders    Cologuard® colon cancer screening    Yeast vaginitis            Yeast vaginitis  Diflucan 150  Follow-up if symptoms do not improve         Macy Yap MD

## 2024-06-14 PROBLEM — Z00.00 ANNUAL PHYSICAL EXAM: Status: ACTIVE | Noted: 2024-06-14

## 2024-06-15 NOTE — PATIENT INSTRUCTIONS
Today you were treated for yeast infection while you are being seen for your annual health maintenance visit and follow-up for your recent hospital visit for the surgery you are doing well continue your therapy follow-up in 3 to 6 months or sooner if you need anything otherwise continue annual exams

## 2024-06-15 NOTE — ASSESSMENT & PLAN NOTE
Above normal BMI nutrition and physical activity reviewed  Work on healthy diet at this time and will worry about physical activity later just working on muscle conditioning at this time

## 2024-08-02 ENCOUNTER — HOSPITAL ENCOUNTER (OUTPATIENT)
Dept: RADIOLOGY | Facility: HOSPITAL | Age: 49
Discharge: HOME | End: 2024-08-02
Payer: COMMERCIAL

## 2024-08-02 VITALS — HEIGHT: 65 IN | WEIGHT: 251 LBS | BODY MASS INDEX: 41.82 KG/M2

## 2024-08-02 DIAGNOSIS — Z12.31 ENCOUNTER FOR SCREENING MAMMOGRAM FOR BREAST CANCER: ICD-10-CM

## 2024-08-02 DIAGNOSIS — G93.89 BRAIN MASS: ICD-10-CM

## 2024-08-02 PROCEDURE — A9575 INJ GADOTERATE MEGLUMI 0.1ML: HCPCS | Performed by: NEUROLOGICAL SURGERY

## 2024-08-02 PROCEDURE — 77067 SCR MAMMO BI INCL CAD: CPT

## 2024-08-02 PROCEDURE — 70553 MRI BRAIN STEM W/O & W/DYE: CPT

## 2024-08-02 PROCEDURE — 2550000001 HC RX 255 CONTRASTS: Performed by: NEUROLOGICAL SURGERY

## 2024-08-02 RX ORDER — GADOTERATE MEGLUMINE 376.9 MG/ML
0.2 INJECTION INTRAVENOUS
Status: COMPLETED | OUTPATIENT
Start: 2024-08-02 | End: 2024-08-02

## 2024-08-07 ENCOUNTER — HOSPITAL ENCOUNTER (OUTPATIENT)
Dept: RADIOLOGY | Facility: EXTERNAL LOCATION | Age: 49
Discharge: HOME | End: 2024-08-07

## 2024-08-07 ENCOUNTER — OFFICE VISIT (OUTPATIENT)
Dept: NEUROSURGERY | Facility: HOSPITAL | Age: 49
End: 2024-08-07
Payer: COMMERCIAL

## 2024-08-07 VITALS
WEIGHT: 250 LBS | SYSTOLIC BLOOD PRESSURE: 116 MMHG | BODY MASS INDEX: 40.18 KG/M2 | RESPIRATION RATE: 17 BRPM | DIASTOLIC BLOOD PRESSURE: 75 MMHG | HEART RATE: 79 BPM | HEIGHT: 66 IN

## 2024-08-07 DIAGNOSIS — Z12.31 ENCOUNTER FOR SCREENING MAMMOGRAM FOR BREAST CANCER: ICD-10-CM

## 2024-08-07 DIAGNOSIS — G93.89 BRAIN MASS: Primary | ICD-10-CM

## 2024-08-07 PROCEDURE — 3078F DIAST BP <80 MM HG: CPT | Performed by: NEUROLOGICAL SURGERY

## 2024-08-07 PROCEDURE — 1036F TOBACCO NON-USER: CPT | Performed by: NEUROLOGICAL SURGERY

## 2024-08-07 PROCEDURE — 99212 OFFICE O/P EST SF 10 MIN: CPT | Performed by: NEUROLOGICAL SURGERY

## 2024-08-07 PROCEDURE — 3074F SYST BP LT 130 MM HG: CPT | Performed by: NEUROLOGICAL SURGERY

## 2024-08-07 PROCEDURE — 3008F BODY MASS INDEX DOCD: CPT | Performed by: NEUROLOGICAL SURGERY

## 2024-08-07 NOTE — PROGRESS NOTES
11-30-23 Craniotomy for meningioma. Today had an MRI on Friday. Having headaches all the time since surgery.    49-year-old woman who underwent resection of left parietal parasagittal meningioma returns for follow-up.  Postoperatively, the patient had dense right-sided paresis.  She has subsequently been improving.  Now, the patient is able to ambulate independently though she still has some symptoms of weakness and still requires some assistance.  She also complains about numbness involving her right head, neck, and arm.    On exam, the patient is alert and interactive.  She has good strength but has some tremulousness on the right.    A new MRI of the brain that I personally reviewed demonstrates postsurgical changes with no clear evidence of recurrence.    The patient continues to heal from her surgery.  I would like to see her in a year with a new MRI of her brain with and without contrast for further surveillance of the tumor.

## 2024-09-16 ENCOUNTER — OFFICE VISIT (OUTPATIENT)
Dept: PRIMARY CARE | Facility: CLINIC | Age: 49
End: 2024-09-16
Payer: COMMERCIAL

## 2024-09-16 VITALS
DIASTOLIC BLOOD PRESSURE: 66 MMHG | BODY MASS INDEX: 44.32 KG/M2 | HEART RATE: 62 BPM | TEMPERATURE: 95.5 F | WEIGHT: 266 LBS | SYSTOLIC BLOOD PRESSURE: 124 MMHG | OXYGEN SATURATION: 94 % | HEIGHT: 65 IN

## 2024-09-16 DIAGNOSIS — J01.00 ACUTE NON-RECURRENT MAXILLARY SINUSITIS: ICD-10-CM

## 2024-09-16 DIAGNOSIS — F32.0 MILD MAJOR DEPRESSION (CMS-HCC): Primary | ICD-10-CM

## 2024-09-16 DIAGNOSIS — G81.01 FLACCID HEMIPLEGIA OF RIGHT DOMINANT SIDE DUE TO NONCEREBROVASCULAR ETIOLOGY (MULTI): ICD-10-CM

## 2024-09-16 PROCEDURE — 99214 OFFICE O/P EST MOD 30 MIN: CPT | Performed by: FAMILY MEDICINE

## 2024-09-16 PROCEDURE — 3074F SYST BP LT 130 MM HG: CPT | Performed by: FAMILY MEDICINE

## 2024-09-16 PROCEDURE — 3008F BODY MASS INDEX DOCD: CPT | Performed by: FAMILY MEDICINE

## 2024-09-16 PROCEDURE — 3078F DIAST BP <80 MM HG: CPT | Performed by: FAMILY MEDICINE

## 2024-09-16 RX ORDER — VENLAFAXINE HYDROCHLORIDE 75 MG/1
75 CAPSULE, EXTENDED RELEASE ORAL
Qty: 90 CAPSULE | Refills: 3 | Status: SHIPPED | OUTPATIENT
Start: 2024-09-16

## 2024-09-16 RX ORDER — VENLAFAXINE HYDROCHLORIDE 75 MG/1
1 CAPSULE, EXTENDED RELEASE ORAL
COMMUNITY
Start: 2024-03-08 | End: 2024-09-16 | Stop reason: SDUPTHER

## 2024-09-16 RX ORDER — METHYLPREDNISOLONE 4 MG/1
TABLET ORAL
Qty: 21 TABLET | Refills: 0 | Status: SHIPPED | OUTPATIENT
Start: 2024-09-16 | End: 2024-09-23

## 2024-09-16 RX ORDER — AMOXICILLIN AND CLAVULANATE POTASSIUM 875; 125 MG/1; MG/1
875 TABLET, FILM COATED ORAL 2 TIMES DAILY
Qty: 20 TABLET | Refills: 0 | Status: SHIPPED | OUTPATIENT
Start: 2024-09-16 | End: 2024-09-26

## 2024-09-16 NOTE — PROGRESS NOTES
"Subjective   Patient ID: Ariane Chavis is a 49 y.o. female who presents for URI (URI x 3 weeks , covid neg).  Depression for several months  Uri for 3 weeks   Fever at start  Persistant right hemiplegia got discharged from PT due to not progressing  Trying to exercise at home         Review of Systems  Constitutional: no chills, no fever and no night sweats.   Eyes: no blurred vision and no eyesight problems.   ENT: no hearing loss, no nasal congestion, no nasal discharge, no hoarseness and no sore throat.   Cardiovascular: no chest pain, no intermittent leg claudication, no lower extremity edema, no palpitations and no syncope.   Respiratory: no cough, no shortness of breath during exertion, no shortness of breath at rest and no wheezing.   Gastrointestinal: no abdominal pain, no blood in stools, no constipation, no diarrhea, no melena, no nausea, no rectal pain and no vomiting.   Genitourinary: no dysuria, no change in urinary frequency, no urinary hesitancy, no feelings of urinary urgency and no vaginal discharge.   Musculoskeletal: no arthralgias,  no back pain and no myalgias.   Integumentary: no new skin lesions and no rashes.   Neurological: no difficulty walking, no headache, no limb weakness, no numbness and no tingling.   Psychiatric: no anxiety, no depression, no anhedonia and no substance use disorders.   Endocrine: no recent weight gain and no recent weight loss.   Hematologic/Lymphatic: no tendency for easy bruising and no swollen glands .    Objective    /66   Pulse 62   Temp 35.3 °C (95.5 °F)   Ht 1.651 m (5' 5\")   Wt 121 kg (266 lb)   SpO2 94%   BMI 44.26 kg/m²    Physical Exam  The patient appeared well nourished and normally developed. Vital signs as documented. Head exam is unremarkable. No scleral icterus or corneal arcus noted.  Pupils are equal round reactive to light extraocular movements are intact no hemorrhages noted on funduscopic exam mouth mucous membranes are moist no " exudates ears canals clear TMs are gray pearly not injected nose no rhinorrhea or epistaxis Neck is without jugular venous distension, thyromegaly, or carotid bruits. Carotid upstrokes are brisk bilaterally. Lungs are clear to auscultation and percussion. Cardiac exam reveals the PMI to be normally sized and situated. Rhythm is regular. First and second heart sounds normal. No murmurs, rubs or gallops. Abdominal exam reveals normal bowel sounds, no masses, no organomegaly and no aortic enlargement. Extremities are nonedematous and both femoral and pedal pulses are normal.  Neurologic exam DTRs are equal bilaterally no focal deficits strength is symmetrical heme lymph no palpable lymph nodes in the neck axilla or groin nose congestion swollen mucosa nosenose congestion swollen mucosa, oropharynx not injected lungs clear   Right side weakness poor balance  Assessment/Plan   Problem List Items Addressed This Visit       Flaccid hemiplegia affecting right dominant side (Multi)    Relevant Orders    Referral to Physical Therapy    Mild major depression (CMS-HCC) - Primary    Relevant Medications    venlafaxine XR (Effexor-XR) 75 mg 24 hr capsule    Acute non-recurrent maxillary sinusitis    Relevant Medications    amoxicillin-pot clavulanate (Augmentin) 875-125 mg tablet    methylPREDNISolone (Medrol Dospak) 4 mg tablets            Macy Yap MD

## 2024-10-18 ENCOUNTER — APPOINTMENT (OUTPATIENT)
Dept: NEUROLOGY | Facility: CLINIC | Age: 49
End: 2024-10-18
Payer: COMMERCIAL

## 2024-10-18 VITALS
HEIGHT: 65 IN | HEART RATE: 78 BPM | WEIGHT: 262 LBS | BODY MASS INDEX: 43.65 KG/M2 | SYSTOLIC BLOOD PRESSURE: 126 MMHG | DIASTOLIC BLOOD PRESSURE: 83 MMHG

## 2024-10-18 DIAGNOSIS — G89.0 CENTRAL PAIN SYNDROME: ICD-10-CM

## 2024-10-18 DIAGNOSIS — G81.91 RIGHT HEMIPARESIS (MULTI): ICD-10-CM

## 2024-10-18 DIAGNOSIS — Z98.890 HISTORY OF RESECTION OF MENINGIOMA: ICD-10-CM

## 2024-10-18 DIAGNOSIS — Z86.03 HISTORY OF RESECTION OF MENINGIOMA: ICD-10-CM

## 2024-10-18 DIAGNOSIS — R51.9 PERSISTENT HEADACHES: Primary | ICD-10-CM

## 2024-10-18 PROCEDURE — 3008F BODY MASS INDEX DOCD: CPT | Performed by: PSYCHIATRY & NEUROLOGY

## 2024-10-18 PROCEDURE — 3079F DIAST BP 80-89 MM HG: CPT | Performed by: PSYCHIATRY & NEUROLOGY

## 2024-10-18 PROCEDURE — 99215 OFFICE O/P EST HI 40 MIN: CPT | Performed by: PSYCHIATRY & NEUROLOGY

## 2024-10-18 PROCEDURE — 3074F SYST BP LT 130 MM HG: CPT | Performed by: PSYCHIATRY & NEUROLOGY

## 2024-10-18 NOTE — PROGRESS NOTES
Subjective     Ariane Chavis is a 49 y.o. year old right handed female presenting as a new patient for symptoms status post left parietal meningioma resection in November 2023 including right hemiparesis, headache, vertigo, vision change, limb pain, muscle spasm.    HPI    She has past medical history significant for hypertension, asthma, cervical cancer status post surgery in 2014, melanoma resection in 2013, bladder sling.    She is evaluated in the office today as a new patient referred by Dr. Blue Mahan, neurosurgery.  She is accompanied to the visit by her .    She presented to the [facility] ED on 11/7/2023 with abrupt onset of numbness of her right face/arm/leg which per the ED note lasted about 10 minutes; per her account today only about 1-2 minutes.  It is hard to discern from her history today whether she was also weak on the right side, and she does seem to indicate difficulty moving it but was able to walk albeit dragging her leg.  By the time of ED presentation and in fact even before then her symptoms had completely resolved.    About a week before the above episode she experienced transient numbness and tingling of the right lower extremity from the knee down, which resolved within 30 minutes.    In the course of ED evaluation she was sent for a head CT showing a 4 cm mass in the left parietal region with associated vasogenic edema.  She was discussed with neurosurgery and transferred to [facility] for further care.  She was given a dose of dexamethasone.    Brain MRI was done subsequently, which I reviewed today, showing a homogenously enhancing dural based left parietal mass with surrounding vasogenic edema compatible with meningioma, without associated midline shift or hydrocephalus.    She was taken to surgery for meningioma resection.    The patient and her  indicate that when she awoke from the surgery she again noted numbness of the entire right hemibody as well as dense plegia of the  right arm and leg.  Video EEG was done showing left parietal skull defect with mild diffuse encephalopathy without evidence of electrographic seizures.  She was noted in postoperative evaluations to have flaccid weakness of the right limbs, which was subsequently noted to improve to 2/5 in the proximal right lower extremity, subsequently also 2/5 at the right shoulder.    Postoperative MRI was done which I reviewed and which shows successful resection of meningioma.  Small linear focus of diffusion restriction with dark ADC correlate in the left parietal region (posterior centrum semiovale extending up to cortex).    She indicates that there was suspicion she might have had a seizure during the operation, although subsequent EEG did not show epileptiform activity.  She was placed on Keppra 500 mg twice daily which has been continued to the present day.    She has made a gradual but significant recovery since then.  She presents today without assistive devices for ambulation but indicates often using a cane or walker when she is out of the house on her own.  She is accompanied today by her  so did not feel the need for this.  At home she sometimes furniture walks.  She does not endorse recent falls.  She continues to note weakness predominantly of the right lower extremity.    She continues to note some degree of sensory disturbance involving the entire right hemibody including the face.  This has improved with time.  She does at times note numbness involving the left face and the left little finger as well.    Only since the surgery, not prior, she has noted daily headaches.  She denies a prior history of migraine or other significant headache syndrome.  Headache location is often diffuse but predominates in the bilateral frontal vertex regions and there is additionally some local incisional pain over the craniotomy site.  The character of the headache pain is variable, sometimes pressure-like and sometimes  sharp and stabbing or burning.  Intensity varies between 3/10 and 8/10.  Some associated photophobia and phonophobia.  No associated nausea or vomiting.  She takes acetaminophen anywhere from 2 to 6 pills/day and that takes the edge off.  She was recently started on venlafaxine 75 mg daily of the extended release formulation for mood, and cannot say that it has significantly impacted her headaches yet.    She has also noted frequent episodes of vertigo since the surgery.  This is often brought on by head movements but is not restricted to when she is lying in bed.    She is additionally bothered by pain of the right extremities, most pronounced in the proximal lower extremity when she is lying in bed trying to sleep.  This can be shooting, burning or throbbing pain.  She has at times had an impression of muscle spasm on the right for which she was prescribed baclofen currently 10 mg nightly but cannot say that it is helping.  Whether related to the baclofen or other factors, she feels tired all the time and often drowsy.    She notes no persistent visual disturbance but intermittently feels that her vision goes a bit out of focus.    She just recently resumed driving and is sticking to short local trips.  She has never felt comfortable with highway driving.    I reviewed her most recent brain MRI from 8/5/2024 which shows status post left parietal craniectomy and meningioma resection with linear enhancement in the operative bed.  Residual dural thickening and nodularity along the left posterior falx as before.    I reviewed MRA of the head and neck from 11/11/2023 showing no large vessel occlusion or high-grade stenosis.    I reviewed cervical spine MRI from 11/13/2023 showing bulging disks at C4/5, C5/6 and C6/7 without mass effect on the cord and without cord signal abnormality.        Review of Systems    Review of Systems:  Neurologic:  As per the history of present illness.  Constitutional:  Negative for  fevers, chills, or weight loss, positive for fatigue.  Musculoskeletal: Positive for neck and back pain. Cardiovascular:  Negative for chest pain or palpitations.  Respiratory:  Negative for dyspnea.  Eyes: As per the history of present illness.  ENT: Positive for hearing loss and tinnitus.  GI:  Negative for bowel incontinence, positive for abdominal pain and constipation.  : Positive for dysuria.  Dermatologic:  Negative for rash.        Patient Active Problem List   Diagnosis    Primary hypertension    Class 3 severe obesity with serious comorbidity and body mass index (BMI) of 40.0 to 44.9 in adult    Status post brain surgery    Flaccid hemiplegia affecting right dominant side (Multi)    Edema of both lower extremities    Physical deconditioning    Vitamin D deficiency    Atopic eczema    Chronic rhinitis    History of peanut allergy    History of cervical cancer    Insomnia    Severe persistent asthma without complication (Multi)    Mild major depression (CMS-HCC)    Seizure disorder (Multi)    History of benign tumor of cerebral meninges    History of melanoma    Meningioma (Multi)    Superficial skin infection    Annual physical exam    Acute non-recurrent maxillary sinusitis     Past Medical History:   Diagnosis Date    Acute candidiasis of vulva and vagina 07/01/2016    Yeast vaginitis    Acute laryngitis 05/29/2017    Acute laryngitis    Acute sinusitis, unspecified 03/29/2020    Acute rhinosinusitis    Acute tonsillitis, unspecified 10/20/2016    Acute tonsillitis    Adjustment disorder with mixed anxiety and depressed mood 03/02/2016    Adjustment reaction with anxiety and depression    Body mass index (BMI)40.0-44.9, adult 03/29/2020    Body mass index (BMI) of 40.0 to 44.9 in adult    Body mass index (BMI)40.0-44.9, adult 01/18/2020    Body mass index (BMI) of 40.0 to 44.9 in adult    Bronchitis 03/14/2024    Bronchitis 03/14/2024    Chronic nonseasonal allergic rhinitis due to pollen 03/02/2022     Contact with and (suspected) exposure to unspecified communicable disease 03/24/2020    Exposure to communicable diseases    Cough 03/14/2024    Comment on above: Added by Problem List Migration; 2013-11-3; Moved to [facility] Nov 24 2013 4:33PM;    Enlarged lymph nodes, unspecified 03/21/2016    Glands swollen    Essential (primary) hypertension 09/05/2014    Benign essential hypertension    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) 01/11/2017    Mild intermittent reactive airway disease with acute exacerbation    Obesity, Class III, BMI 40-49.9 (morbid obesity) (Multi) 05/12/2024    Other conditions influencing health status 05/29/2017    History of cough    Other conditions influencing health status     History of pregnancy    Personal history of malignant melanoma of skin 12/15/2021    History of malignant melanoma    Personal history of malignant neoplasm of cervix uteri     History of malignant neoplasm of cervix uteri    Personal history of other diseases of the circulatory system 10/25/2014    History of varicose veins    Personal history of other diseases of the circulatory system     Personal history of supraventricular tachycardia    Personal history of other diseases of the female genital tract 02/06/2015    History of dysfunctional uterine bleeding    Personal history of other diseases of the female genital tract 12/22/2017    History of abnormal cervical Papanicolaou smear    Personal history of other diseases of the respiratory system 10/18/2016    History of pharyngitis    Personal history of other diseases of the respiratory system 04/03/2019    History of acute bronchitis with bronchospasm    Personal history of other diseases of the respiratory system 05/29/2017    History of acute sinusitis    Personal history of other diseases of the respiratory system  12/22/2017    History of acute bronchitis with bronchospasm    Personal history of other endocrine, nutritional and metabolic disease 01/16/2019    History of obesity    Personal history of other specified conditions 03/02/2016    History of lymphadenopathy    Personal history of other specified conditions     History of abnormal Pap smear    Symptoms involving urinary system 03/14/2024    Unspecified asthma with (acute) exacerbation ([facility]) 03/31/2019    Acute asthma exacerbation    Unspecified open wound of other finger without damage to nail, initial encounter 11/26/2019    Avulsion of skin of index finger, initial encounter    Urinary tract infection, site not specified 01/16/2019    Frequent UTI    Urinary tract infection, site not specified 06/28/2016    Acute UTI     Past Surgical History:   Procedure Laterality Date    CERVICAL BIOPSY  W/ LOOP ELECTRODE EXCISION  10/25/2014    Cervical Loop Electrosurgical Excision (LEEP)    HYSTERECTOMY  05/19/2015    Hysterectomy    MR HEAD ANGIO WO IV CONTRAST  11/11/2023    MR HEAD ANGIO WO IV CONTRAST 11/11/2023 [facility] MRI    MR NECK ANGIO WO IV CONTRAST  11/11/2023    MR NECK ANGIO WO IV CONTRAST 11/11/2023 [facility] MRI    OTHER SURGICAL HISTORY  03/02/2016    Vaginal Surg Insertion Of Mesh For Pelvic Floor Repair    OTHER SURGICAL HISTORY  10/25/2014    Biopsy Skin     Social History     Tobacco Use    Smoking status: Never    Smokeless tobacco: Never   Substance Use Topics    Alcohol use: Never     family history includes Diabetes type II in her mother; Heart disease in her father; Hypertension in her father and mother.    Current Outpatient Medications:     acetaminophen (Tylenol) 325 mg tablet, Take 2 tablets (650 mg) by mouth every 6 hours if needed for mild pain (1 - 3)., Disp: 30 tablet, Rfl: 0    albuterol 90 mcg/actuation inhaler, Inhale 2 puffs every 6 hours if needed for wheezing., Disp: 18 g, Rfl: 11    ammonium lactate (Amlactin) 12 % cream, Apply 1 Application  topically if needed for dry skin., Disp: , Rfl:     atenoloL-chlorthalidone (Tenoretic) 50-25 mg tablet, Take 1 tablet by mouth once daily., Disp: 90 tablet, Rfl: 3    azelastine (Optivar) 0.05 % ophthalmic solution, Administer 1 drop into both eyes 2 times a day as needed (allergies)., Disp: , Rfl:     baclofen (Lioresal) 10 mg tablet, Take 0.5 tablets (5 mg) by mouth 3 times a day., Disp: , Rfl:     baclofen (Lioresal) 10 mg tablet, Take 1 tablet (10 mg) by mouth once daily at bedtime., Disp: 90 tablet, Rfl: 3    budesonide (Pulmicort) 1 mg/2 mL nebulizer solution, Take 2 mL (1 mg) by nebulization 2 times a day., Disp: 60 mL, Rfl: 3    cetirizine (ZyrTEC) 5 mg tablet, Take 1 tablet (5 mg) by mouth once daily., Disp: , Rfl:     cromolyn (Opticrom) 4 % ophthalmic solution, INSTILL 1 DROP INTO EACH EYE 4 TIMES DAILY AS NEEDED, Disp: , Rfl:     Fasenra Pen 30 mg/mL injection, Inject 1 mL (30 mg) under the skin every 28 (twenty-eight) days., Disp: , Rfl:     fluticasone (Flonase) 50 mcg/actuation nasal spray, Administer 1 spray into each nostril once daily. Shake gently. Before first use, prime pump. After use, clean tip and replace cap., Disp: , Rfl:     fluticasone-umeclidin-vilanter (Trelegy Ellipta) 200-62.5-25 mcg blister with device, Inhale 1 puff once daily., Disp: , Rfl:     levalbuterol (Xopenex) 1.25 mg/3 mL nebulizer solution, Take 1 ampule by nebulization every 6 hours if needed for wheezing or shortness of breath., Disp: 72 mL, Rfl: 11    levETIRAcetam (Keppra) 500 mg tablet, Take 1 tablet (500 mg) by mouth 2 times a day. Please follow up with neurology for ongoing management of this medication., Disp: 180 tablet, Rfl: 3    lisinopril (PriniviL) 20 mg tablet, Take 1 tablet (20 mg) by mouth once daily., Disp: 90 tablet, Rfl: 3    metroNIDAZOLE (Metrogel) 1 % gel, Apply 1 Application topically once daily., Disp: , Rfl:     montelukast (Singulair) 10 mg tablet, Take 1 tablet (10 mg) by mouth once daily at  bedtime., Disp: , Rfl:     omalizumab (Xolair) 150 mg/mL injection, Inject 1 mL (150 mg) under the skin every 28 (twenty-eight) days. October 15th last dose.  Do NOT resume until December dosing. Do not start before December 15, 2023., Disp: , Rfl:     ondansetron (Zofran) 4 mg tablet, Take 1 tablet (4 mg) by mouth every 8 hours if needed for nausea or vomiting., Disp: , Rfl:     polyethylene glycol (Glycolax, Miralax) 17 gram packet, Take 17 g by mouth once daily., Disp: , Rfl:     predniSONE (Deltasone) 10 mg tablet, take 4 tablets by mouth daily for 3 days then take 3 tablets graeme...  (REFER TO PRESCRIPTION NOTES)., Disp: , Rfl:     sennosides-docusate sodium (Georgina-Colace) 8.6-50 mg tablet, Take 2 tablets by mouth 2 times a day., Disp: , Rfl:     triamcinolone (Kenalog) 0.1 % ointment, Apply 1 Application topically 2 times a day as needed for rash., Disp: , Rfl:     venlafaxine XR (Effexor-XR) 75 mg 24 hr capsule, Take 1 capsule (75 mg) by mouth early in the morning.., Disp: 90 capsule, Rfl: 3  Allergies   Allergen Reactions    Bee Pollen Other    Chlorpheniramine-Pseudoephed Other    Dog Dander Other    Fish Derived Other    Grass Pollen Wheezing    Peanut Other    Peanut Oil Swelling    Pollen Extracts Other    Sesame Oil Swelling       Objective   Neurological Exam  Physical Exam    Physical Examination:    General: Alert woman who was ambulatory without assistive devices.      Mental Status: Clear sensorium without fluctuation.  Appropriate and oriented in conversation.  Recent and remote recall intact for details of medical history.  Attention and concentration intact during interview.  Fund of knowledge fair for medical information.  Language intact and fluent without paraphasic errors.    Cranial Nerves:  Funduscopic exam revealed sharp temporal disc margins bilaterally.  Pupils were equal, round and reactive to light with no relative afferent pupillary defect.  Extraocular movements were intact and  conjugate without nystagmus.  No ptosis.  Visual fields were full to confrontation tested binocularly.  Facial sensory testing was notable for diminished pin perception over the right face compared to left.  Facial motor function was symmetrically intact.  Hearing was grossly intact.  No dysarthria.  Shoulder shrug was symmetric.  Tongue protrusion was midline.    Motor: Muscle tone was normal throughout with the exception of possible mild increase in the right lower extremity.  There was no pronator drift.  Finger taps were markedly slowed on the right compared to the left.  Confrontation strength was 5/5 throughout the left limbs and throughout the right upper extremity with the exception of 4-4+ right middle deltoid.  Right lower extremity was as follows: Partial 3/5 hip flexion (only about 20-30% range of the left and 4 - within that range), 5 quadriceps, 4-4+ hamstring, 5 at ankle dorsiflexion/inversion/eversion/plantarflexion.    Coordination: Finger to finger was accurate bilaterally without dysmetria or intention tremor.  Left heel-to-shin was intact while she was unable to perform right heel-to-shin due to weakness and poor range at right hip flexion.    Tendon Reflexes: Biceps 3+ right and trace left, brachioradialis 2+ right and trace left, triceps absent bilaterally, patellar 3+ right and 2+ left, ankles 2-3+ right and 2+ left, neutral plantars.    Sensation: Pin was diminished over the right hand and ankle compared to the left.  Light touch was perceived as softer over these areas on the left compared to the right.  Vibration perception was present but weaker at the right index and great toe compared to the left.  Romberg sign was present.    Station: Intact and stable.    Gait: Stable without assistance and moderately rapid but notable for an awkward, somewhat jerky appearance with asymmetric weighting of the left lower extremity.      Assessment/Plan     She endorses significant improvement over time  but some ongoing complaints status post left parietal meningioma resection.  This includes daily headaches and a right hemibody central pain syndrome.    As she is tolerating a low-dose of venlafaxine without side effects and there is certainly room for titration, I recommended increasing to 150 mg daily.  If this is too strong as a single dose then I advised her to divide it at 75 mg twice daily.  I discussed that this titration may help with headaches and right hemibody pain.  I advised her to contact my office after 4-6 weeks on this dose to determine whether there is an indication for further titration or a different strategy.  She is to contact the office sooner if she cannot tolerate the higher dose of venlafaxine.    I advised her to stop baclofen.  We discussed that it might be the cause of her drowsiness or at least a significant contributor, and that there is not much room for upward titration given that it can lower the seizure threshold.  In this regard I discussed that it is also possible levetiracetam is contributing to drowsiness, and the dose of levetiracetam could be reduced in the future but with the caveat that she would need to go on driving restriction for a period of time.    For the time being I advised her to continue the present dose of levetiracetam.    She expressed interest in a course of aquatic therapy and I provided an order for this.    I advised her to continue following with neurosurgery for periodic surveillance MRIs to evaluate for recurrence of meningioma.    She will continue using a cane/walker as she sees fit to stabilize her gait and reduce risk of falls.    We will determine a follow up plan once I have heard back from her about the response to venlafaxine titration.

## 2024-10-18 NOTE — PATIENT INSTRUCTIONS
As we discussed, I think it is likely that your headaches and your right hemibody pain would improve with increasing the dose of venlafaxine to 150 mg daily.  You can increase this as a single morning dose but if that is too strong, try taking one pill in the morning and one pill at night instead.    Contact my office after you have been on venlafaxine at the higher dose for 4-6 weeks let me know how you are doing with it.    We discussed that although muscle spasms may be part of the problem here, I would advise against increasing baclofen further because baclofen could make a seizure more likely if we push the dose up aggressively.    Additionally, it sounds like baclofen may be part of the problem making you drowsy all day, so I recommend you STOP baclofen and see if the drowsiness improves.    We will determine further management steps after seeing how you respond to the higher dose of venlafaxine.    Continue following with neurosurgery for periodic surveillance MRIs to exclude recurrence of meningioma.    Continue using a cane or walker as you see fit to stabilize your gait and reduce risk of falls.    I am providing a referral for aquatic therapy as we discussed, since you found it quite beneficial.

## 2024-11-18 DIAGNOSIS — F32.0 MILD MAJOR DEPRESSION (CMS-HCC): ICD-10-CM

## 2024-11-18 DIAGNOSIS — F32.A DEPRESSION, UNSPECIFIED DEPRESSION TYPE: Primary | ICD-10-CM

## 2024-11-18 RX ORDER — VENLAFAXINE HYDROCHLORIDE 75 MG/1
150 CAPSULE, EXTENDED RELEASE ORAL DAILY
Qty: 180 CAPSULE | Refills: 0 | Status: SHIPPED | OUTPATIENT
Start: 2024-11-18

## 2025-01-20 ENCOUNTER — APPOINTMENT (OUTPATIENT)
Dept: PRIMARY CARE | Facility: CLINIC | Age: 50
End: 2025-01-20
Payer: COMMERCIAL

## 2025-01-20 VITALS
HEIGHT: 65 IN | OXYGEN SATURATION: 92 % | SYSTOLIC BLOOD PRESSURE: 130 MMHG | WEIGHT: 266 LBS | DIASTOLIC BLOOD PRESSURE: 80 MMHG | HEART RATE: 83 BPM | TEMPERATURE: 97.3 F | BODY MASS INDEX: 44.32 KG/M2

## 2025-01-20 DIAGNOSIS — F32.0 MILD MAJOR DEPRESSION (CMS-HCC): ICD-10-CM

## 2025-01-20 DIAGNOSIS — Z98.890 STATUS POST BRAIN SURGERY: Primary | ICD-10-CM

## 2025-01-20 DIAGNOSIS — G81.01 FLACCID HEMIPLEGIA OF RIGHT DOMINANT SIDE DUE TO NONCEREBROVASCULAR ETIOLOGY (MULTI): ICD-10-CM

## 2025-01-20 PROCEDURE — 99214 OFFICE O/P EST MOD 30 MIN: CPT | Performed by: FAMILY MEDICINE

## 2025-01-20 PROCEDURE — 3079F DIAST BP 80-89 MM HG: CPT | Performed by: FAMILY MEDICINE

## 2025-01-20 PROCEDURE — 1036F TOBACCO NON-USER: CPT | Performed by: FAMILY MEDICINE

## 2025-01-20 PROCEDURE — 3008F BODY MASS INDEX DOCD: CPT | Performed by: FAMILY MEDICINE

## 2025-01-20 PROCEDURE — 3075F SYST BP GE 130 - 139MM HG: CPT | Performed by: FAMILY MEDICINE

## 2025-01-20 ASSESSMENT — PATIENT HEALTH QUESTIONNAIRE - PHQ9
2. FEELING DOWN, DEPRESSED OR HOPELESS: NOT AT ALL
1. LITTLE INTEREST OR PLEASURE IN DOING THINGS: NOT AT ALL
SUM OF ALL RESPONSES TO PHQ9 QUESTIONS 1 AND 2: 0

## 2025-01-20 ASSESSMENT — COLUMBIA-SUICIDE SEVERITY RATING SCALE - C-SSRS
6. HAVE YOU EVER DONE ANYTHING, STARTED TO DO ANYTHING, OR PREPARED TO DO ANYTHING TO END YOUR LIFE?: NO
1. IN THE PAST MONTH, HAVE YOU WISHED YOU WERE DEAD OR WISHED YOU COULD GO TO SLEEP AND NOT WAKE UP?: NO
2. HAVE YOU ACTUALLY HAD ANY THOUGHTS OF KILLING YOURSELF?: NO

## 2025-01-20 NOTE — PROGRESS NOTES
"Subjective   Patient ID: Ariane Chavis is a 49 y.o. female who presents for Follow-up (Pt is here for F/U meds ).  HPI    Review of Systems  Constitutional: no chills, no fever and no night sweats.   Eyes: no blurred vision and no eyesight problems.   ENT: no hearing loss, no nasal congestion, no nasal discharge, no hoarseness and no sore throat.   Cardiovascular: no chest pain, no intermittent leg claudication, no lower extremity edema, no palpitations and no syncope.   Respiratory: no cough, no shortness of breath during exertion, no shortness of breath at rest and no wheezing.   Gastrointestinal: no abdominal pain, no blood in stools, no constipation, no diarrhea, no melena, no nausea, no rectal pain and no vomiting.   Genitourinary: no dysuria, no change in urinary frequency, no urinary hesitancy, no feelings of urinary urgency and no vaginal discharge.   Musculoskeletal: no arthralgias,  no back pain and no myalgias.   Integumentary: no new skin lesions and no rashes.   Neurological: no difficulty walking, no headache, no limb weakness, no numbness and no tingling.   Psychiatric: no anxiety, no depression, no anhedonia and no substance use disorders.   Endocrine: no recent weight gain and no recent weight loss.   Hematologic/Lymphatic: no tendency for easy bruising and no swollen glands .    Objective    /80   Pulse 83   Temp 36.3 °C (97.3 °F)   Ht 1.651 m (5' 5\")   Wt 121 kg (266 lb)   SpO2 92%   BMI 44.26 kg/m²    Physical Exam  The patient appeared well nourished and normally developed. Vital signs as documented. Head exam is unremarkable. No scleral icterus or corneal arcus noted.  Pupils are equal round reactive to light extraocular movements are intact no hemorrhages noted on funduscopic exam mouth mucous membranes are moist no exudates ears canals clear TMs are gray pearly not injected nose no rhinorrhea or epistaxis Neck is without jugular venous distension, thyromegaly, or carotid bruits. " Carotid upstrokes are brisk bilaterally. Lungs are clear to auscultation and percussion. Cardiac exam reveals the PMI to be normally sized and situated. Rhythm is regular. First and second heart sounds normal. No murmurs, rubs or gallops. Abdominal exam reveals normal bowel sounds, no masses, no organomegaly and no aortic enlargement. Extremities are nonedematous and both femoral and pedal pulses are normal.  Neurologic exam DTRs are equal bilaterally no focal deficits strength is symmetrical heme lymph no palpable lymph nodes in the neck axilla or groin  Right side weakness   Assessment/Plan   Problem List Items Addressed This Visit       Status post brain surgery - Primary    Relevant Orders    Referral to Physical Therapy    Referral to Neurology    Flaccid hemiplegia affecting right dominant side (Multi)    Relevant Orders    Referral to Physical Therapy    Referral to Neurology    Mild major depression (CMS-HCC)            Macy Yap MD

## 2025-02-17 DIAGNOSIS — I10 PRIMARY HYPERTENSION: ICD-10-CM

## 2025-02-17 DIAGNOSIS — I15.9 SECONDARY HYPERTENSION: ICD-10-CM

## 2025-02-17 RX ORDER — ATENOLOL AND CHLORTHALIDONE TABLET 50; 25 MG/1; MG/1
1 TABLET ORAL DAILY
Qty: 90 TABLET | Refills: 0 | Status: SHIPPED | OUTPATIENT
Start: 2025-02-17

## 2025-02-17 RX ORDER — LISINOPRIL 20 MG/1
20 TABLET ORAL DAILY
Qty: 90 TABLET | Refills: 0 | Status: SHIPPED | OUTPATIENT
Start: 2025-02-17

## 2025-04-02 DIAGNOSIS — G40.909 SEIZURE DISORDER (MULTI): ICD-10-CM

## 2025-04-03 RX ORDER — LEVETIRACETAM 500 MG/1
TABLET ORAL
Qty: 180 TABLET | Refills: 1 | Status: SHIPPED | OUTPATIENT
Start: 2025-04-03

## 2025-06-17 ENCOUNTER — APPOINTMENT (OUTPATIENT)
Dept: PRIMARY CARE | Facility: CLINIC | Age: 50
End: 2025-06-17
Payer: COMMERCIAL

## 2025-06-17 VITALS
DIASTOLIC BLOOD PRESSURE: 84 MMHG | SYSTOLIC BLOOD PRESSURE: 134 MMHG | BODY MASS INDEX: 42.43 KG/M2 | HEIGHT: 66 IN | WEIGHT: 264 LBS | OXYGEN SATURATION: 93 % | TEMPERATURE: 97.1 F | HEART RATE: 83 BPM

## 2025-06-17 DIAGNOSIS — J02.9 PHARYNGITIS, UNSPECIFIED ETIOLOGY: Primary | ICD-10-CM

## 2025-06-17 DIAGNOSIS — Z12.11 COLON CANCER SCREENING: ICD-10-CM

## 2025-06-17 DIAGNOSIS — R51.9 CHRONIC DAILY HEADACHE: ICD-10-CM

## 2025-06-17 DIAGNOSIS — Z00.00 ANNUAL PHYSICAL EXAM: ICD-10-CM

## 2025-06-17 LAB — POC RAPID STREP: NEGATIVE

## 2025-06-17 PROCEDURE — 87880 STREP A ASSAY W/OPTIC: CPT | Performed by: FAMILY MEDICINE

## 2025-06-17 PROCEDURE — 3079F DIAST BP 80-89 MM HG: CPT | Performed by: FAMILY MEDICINE

## 2025-06-17 PROCEDURE — 99213 OFFICE O/P EST LOW 20 MIN: CPT | Performed by: FAMILY MEDICINE

## 2025-06-17 PROCEDURE — 1036F TOBACCO NON-USER: CPT | Performed by: FAMILY MEDICINE

## 2025-06-17 PROCEDURE — 3075F SYST BP GE 130 - 139MM HG: CPT | Performed by: FAMILY MEDICINE

## 2025-06-17 PROCEDURE — 3008F BODY MASS INDEX DOCD: CPT | Performed by: FAMILY MEDICINE

## 2025-06-17 RX ORDER — GABAPENTIN 300 MG/1
300 CAPSULE ORAL 3 TIMES DAILY
Qty: 270 CAPSULE | Refills: 3 | Status: SHIPPED | OUTPATIENT
Start: 2025-06-17

## 2025-06-17 RX ORDER — FLUCONAZOLE 150 MG/1
150 TABLET ORAL ONCE
Qty: 1 TABLET | Refills: 0 | Status: SHIPPED | OUTPATIENT
Start: 2025-06-17 | End: 2025-06-17

## 2025-06-17 RX ORDER — AZITHROMYCIN 250 MG/1
TABLET, FILM COATED ORAL
Qty: 6 TABLET | Refills: 0 | Status: SHIPPED | OUTPATIENT
Start: 2025-06-17 | End: 2025-06-22

## 2025-06-17 NOTE — PROGRESS NOTES
"Subjective   Patient ID: Ariane Chavis is a 50 y.o. female who presents for Medication Problem (Pt presents today to request to increase gabapentin) and Sore Throat (Pt reports sore throat x3 wks).  History of brain tumor s/p resection  Chronic daily headaches since  Sore throat for the last few weeks        Review of Systems  Constitutional: no chills, no fever and no night sweats.   Eyes: no blurred vision and no eyesight problems.   ENT: no hearing loss, no nasal congestion, no nasal discharge, no hoarseness and no sore throat.   Cardiovascular: no chest pain, no intermittent leg claudication, no lower extremity edema, no palpitations and no syncope.   Respiratory: no cough, no shortness of breath during exertion, no shortness of breath at rest and no wheezing.   Gastrointestinal: no abdominal pain, no blood in stools, no constipation, no diarrhea, no melena, no nausea, no rectal pain and no vomiting.   Genitourinary: no dysuria, no change in urinary frequency, no urinary hesitancy, no feelings of urinary urgency and no vaginal discharge.   Musculoskeletal: no arthralgias,  no back pain and no myalgias.   Integumentary: no new skin lesions and no rashes.   Neurological: no difficulty walking, no headache, no limb weakness, no numbness and no tingling.   Psychiatric: no anxiety, no depression, no anhedonia and no substance use disorders.   Endocrine: no recent weight gain and no recent weight loss.   Hematologic/Lymphatic: no tendency for easy bruising and no swollen glands .    Objective    /84   Pulse 83   Temp 36.2 °C (97.1 °F)   Ht 1.676 m (5' 6\")   Wt 120 kg (264 lb)   SpO2 93%   BMI 42.61 kg/m²    Physical Exam  The patient appeared well nourished and normally developed. Vital signs as documented. Head exam is unremarkable. No scleral icterus or corneal arcus noted.  Pupils are equal round reactive to light extraocular movements are intact no hemorrhages noted on funduscopic exam mouth mucous " membranes are moist no exudates ears canals clear TMs are gray pearly not injected nose no rhinorrhea or epistaxis Neck is without jugular venous distension, thyromegaly, or carotid bruits. Carotid upstrokes are brisk bilaterally. Lungs are clear to auscultation and percussion. Cardiac exam reveals the PMI to be normally sized and situated. Rhythm is regular. First and second heart sounds normal. No murmurs, rubs or gallops. Abdominal exam reveals normal bowel sounds, no masses, no organomegaly and no aortic enlargement. Extremities are nonedematous and both femoral and pedal pulses are normal.  Neurologic exam DTRs are equal bilaterally no focal deficits strength is symmetrical heme lymph no palpable lymph nodes in the neck axilla or groin    Assessment/Plan   Problem List Items Addressed This Visit       Annual physical exam    Relevant Orders    Comprehensive metabolic panel    Hemoglobin A1C    Lipid panel    Hepatitis C antibody    CBC and Auto Differential    Pharyngitis - Primary    Relevant Orders    POCT rapid strep A manually resulted (Completed)    Chronic daily headache    Relevant Medications    gabapentin (Neurontin) 300 mg capsule     Other Visit Diagnoses         Colon cancer screening        Relevant Orders    Cologuard® colon cancer screening                 Macy Yap MD

## 2025-06-30 PROBLEM — R51.9 CHRONIC DAILY HEADACHE: Status: ACTIVE | Noted: 2025-06-30

## 2025-06-30 PROBLEM — J02.9 PHARYNGITIS: Status: ACTIVE | Noted: 2025-06-30

## 2025-07-01 DIAGNOSIS — I15.9 SECONDARY HYPERTENSION: ICD-10-CM

## 2025-07-01 RX ORDER — ATENOLOL AND CHLORTHALIDONE TABLET 50; 25 MG/1; MG/1
1 TABLET ORAL DAILY
Qty: 90 TABLET | Refills: 0 | Status: SHIPPED | OUTPATIENT
Start: 2025-07-01

## 2025-07-07 DIAGNOSIS — Z98.890 STATUS POST BRAIN SURGERY: Primary | ICD-10-CM

## 2025-07-24 DIAGNOSIS — I10 PRIMARY HYPERTENSION: ICD-10-CM

## 2025-07-25 RX ORDER — LISINOPRIL 20 MG/1
20 TABLET ORAL DAILY
Qty: 90 TABLET | Refills: 0 | Status: SHIPPED | OUTPATIENT
Start: 2025-07-25

## 2025-08-07 ENCOUNTER — HOSPITAL ENCOUNTER (OUTPATIENT)
Dept: RADIOLOGY | Facility: HOSPITAL | Age: 50
Discharge: HOME | End: 2025-08-07
Payer: COMMERCIAL

## 2025-08-07 VITALS — WEIGHT: 264.55 LBS | BODY MASS INDEX: 42.52 KG/M2 | HEIGHT: 66 IN

## 2025-08-07 DIAGNOSIS — Z12.31 ENCOUNTER FOR SCREENING MAMMOGRAM FOR MALIGNANT NEOPLASM OF BREAST: ICD-10-CM

## 2025-08-07 DIAGNOSIS — G93.89 BRAIN MASS: ICD-10-CM

## 2025-08-07 PROCEDURE — 77067 SCR MAMMO BI INCL CAD: CPT | Performed by: STUDENT IN AN ORGANIZED HEALTH CARE EDUCATION/TRAINING PROGRAM

## 2025-08-07 PROCEDURE — 2550000001 HC RX 255 CONTRASTS: Performed by: NEUROLOGICAL SURGERY

## 2025-08-07 PROCEDURE — 77067 SCR MAMMO BI INCL CAD: CPT

## 2025-08-07 PROCEDURE — 70553 MRI BRAIN STEM W/O & W/DYE: CPT

## 2025-08-07 PROCEDURE — 77063 BREAST TOMOSYNTHESIS BI: CPT | Performed by: STUDENT IN AN ORGANIZED HEALTH CARE EDUCATION/TRAINING PROGRAM

## 2025-08-07 PROCEDURE — A9575 INJ GADOTERATE MEGLUMI 0.1ML: HCPCS | Performed by: NEUROLOGICAL SURGERY

## 2025-08-07 RX ORDER — GADOTERATE MEGLUMINE 376.9 MG/ML
0.2 INJECTION INTRAVENOUS
Status: COMPLETED | OUTPATIENT
Start: 2025-08-07 | End: 2025-08-07

## 2025-08-07 RX ADMIN — GADOTERATE MEGLUMINE 24 ML: 376.9 INJECTION INTRAVENOUS at 16:05

## 2025-08-11 ENCOUNTER — APPOINTMENT (OUTPATIENT)
Dept: NEUROSURGERY | Facility: CLINIC | Age: 50
End: 2025-08-11
Payer: COMMERCIAL

## 2025-08-11 VITALS — SYSTOLIC BLOOD PRESSURE: 128 MMHG | DIASTOLIC BLOOD PRESSURE: 74 MMHG

## 2025-08-11 DIAGNOSIS — G93.89 BRAIN MASS: Primary | ICD-10-CM

## 2025-08-11 PROCEDURE — 3078F DIAST BP <80 MM HG: CPT | Performed by: NEUROLOGICAL SURGERY

## 2025-08-11 PROCEDURE — 99212 OFFICE O/P EST SF 10 MIN: CPT | Performed by: NEUROLOGICAL SURGERY

## 2025-08-11 PROCEDURE — 3074F SYST BP LT 130 MM HG: CPT | Performed by: NEUROLOGICAL SURGERY

## 2025-08-11 PROCEDURE — 1036F TOBACCO NON-USER: CPT | Performed by: NEUROLOGICAL SURGERY

## 2025-08-11 ASSESSMENT — PAIN SCALES - GENERAL: PAINLEVEL_OUTOF10: 6

## 2025-08-20 DIAGNOSIS — G93.89 BRAIN MASS: ICD-10-CM

## 2025-08-26 ENCOUNTER — APPOINTMENT (OUTPATIENT)
Dept: NEUROLOGY | Facility: CLINIC | Age: 50
End: 2025-08-26
Payer: COMMERCIAL

## 2025-08-28 DIAGNOSIS — J02.9 PHARYNGITIS, UNSPECIFIED ETIOLOGY: ICD-10-CM

## 2025-08-28 DIAGNOSIS — G40.909 SEIZURE DISORDER (MULTI): ICD-10-CM

## 2025-08-28 DIAGNOSIS — I10 PRIMARY HYPERTENSION: ICD-10-CM

## 2025-08-29 RX ORDER — LISINOPRIL 20 MG/1
20 TABLET ORAL DAILY
Qty: 90 TABLET | Refills: 0 | Status: SHIPPED | OUTPATIENT
Start: 2025-08-29

## 2025-08-29 RX ORDER — LEVETIRACETAM 500 MG/1
TABLET ORAL
Qty: 180 TABLET | Refills: 0 | Status: SHIPPED | OUTPATIENT
Start: 2025-08-29

## 2025-08-29 RX ORDER — FLUCONAZOLE 150 MG/1
TABLET ORAL
Qty: 1 TABLET | Refills: 0 | Status: SHIPPED | OUTPATIENT
Start: 2025-08-29

## 2025-09-15 ENCOUNTER — APPOINTMENT (OUTPATIENT)
Dept: PRIMARY CARE | Facility: CLINIC | Age: 50
End: 2025-09-15
Payer: COMMERCIAL

## (undated) DEVICE — BAG, PLASTIC, 10 X 17 IN

## (undated) DEVICE — HOLSTER, ELECTROSURGERY ACCESSORY, STERILE

## (undated) DEVICE — TAPE, SILK, DURAPORE, 3 IN X 10 YD, LF

## (undated) DEVICE — CASSETTE, SONOPET IQ IRRIGATION SUCTION

## (undated) DEVICE — TRAY, MINOR, SINGLE BASIN, STERILE

## (undated) DEVICE — DRESSING, GAUZE, PETROLATUM, PATCH, XEROFORM, 1 X 8 IN, STERILE

## (undated) DEVICE — DRAPE, INCISE, ANTIMICROBIAL, IOBAN 2, LARGE, 17 X 23 IN, DISPOSABLE, STERILE

## (undated) DEVICE — SPONGE, HEMOSTATIC, CELLULOSE, SURGICEL, NU-KNIT, 3 X 4 IN

## (undated) DEVICE — Device

## (undated) DEVICE — SPONGE, HEMOSTATIC, CELLULOSE, SURGICEL, 2 X 14 IN

## (undated) DEVICE — COVER, CART, 45 X 27 X 48 IN, CLEAR

## (undated) DEVICE — DRAPE, MICROSCOPE, FOR ZEISS 65MM, VARI-LENS II, 52 X 150

## (undated) DEVICE — COVER, TABLE, UHC

## (undated) DEVICE — DRAPE PACK, CRANIOTOMY, CUSTOM, UHC

## (undated) DEVICE — RETRACTOR, HOOK, FISH, NEURO

## (undated) DEVICE — TIP, SONOPET IQ, STANDARD, ROUND, 12CM

## (undated) DEVICE — SEALANT, HEMOSTATIC, FLOSEAL, 10 ML

## (undated) DEVICE — STAPLER, SKIN PROXIMATE, 35 WIDE

## (undated) DEVICE — MARKER, SKIN, RULER AND LABEL PACK, CUSTOM

## (undated) DEVICE — APPLICATOR, PREP, CHLORAPREP, W/ORANGE TINT, 10.5ML

## (undated) DEVICE — SPONGE, HEMOSTAT, SURGICEL FIBRILLAR, ABS, 4 X 4, LF

## (undated) DEVICE — DRAPE, INSTRUMENT, W/POUCH, STERI DRAPE, 7 X 11 IN, DISPOSABLE, STERILE

## (undated) DEVICE — MANIFOLD, 4 PORT NEPTUNE STANDARD

## (undated) DEVICE — TRAY, FOLEY, URINE METER, 16FR, SILICONE, W/STATLOCK

## (undated) DEVICE — BUR, PERFORATOR, CRANIAL, ACRA-CUT 14/11MM, CDM

## (undated) DEVICE — CONTAINER, SPECIMEN, 120 ML, STERILE

## (undated) DEVICE — GOWN, SURGICAL, SMARTGOWN, XLARGE, STERILE